# Patient Record
Sex: FEMALE | Race: WHITE | NOT HISPANIC OR LATINO
[De-identification: names, ages, dates, MRNs, and addresses within clinical notes are randomized per-mention and may not be internally consistent; named-entity substitution may affect disease eponyms.]

---

## 2017-02-18 ENCOUNTER — MEDICATION RENEWAL (OUTPATIENT)
Age: 72
End: 2017-02-18

## 2017-02-27 ENCOUNTER — APPOINTMENT (OUTPATIENT)
Dept: RHEUMATOLOGY | Facility: CLINIC | Age: 72
End: 2017-02-27

## 2017-02-27 VITALS
DIASTOLIC BLOOD PRESSURE: 82 MMHG | WEIGHT: 100 LBS | HEIGHT: 62 IN | BODY MASS INDEX: 18.4 KG/M2 | TEMPERATURE: 98.3 F | SYSTOLIC BLOOD PRESSURE: 120 MMHG | RESPIRATION RATE: 20 BRPM | HEART RATE: 76 BPM

## 2017-02-27 RX ORDER — METFORMIN HYDROCHLORIDE 500 MG/1
500 TABLET, COATED ORAL
Refills: 0 | Status: ACTIVE | COMMUNITY

## 2017-02-27 RX ORDER — AMLODIPINE BESYLATE 5 MG/1
5 TABLET ORAL
Refills: 0 | Status: ACTIVE | COMMUNITY

## 2017-02-27 RX ORDER — PNV NO.95/FERROUS FUM/FOLIC AC 28MG-0.8MG
TABLET ORAL
Refills: 0 | Status: ACTIVE | COMMUNITY

## 2017-04-25 ENCOUNTER — OUTPATIENT (OUTPATIENT)
Dept: OUTPATIENT SERVICES | Facility: HOSPITAL | Age: 72
LOS: 1 days | End: 2017-04-25
Payer: MEDICARE

## 2017-04-25 DIAGNOSIS — M17.11 UNILATERAL PRIMARY OSTEOARTHRITIS, RIGHT KNEE: ICD-10-CM

## 2017-04-25 DIAGNOSIS — Z51.89 ENCOUNTER FOR OTHER SPECIFIED AFTERCARE: ICD-10-CM

## 2017-04-25 DIAGNOSIS — Z96.649 PRESENCE OF UNSPECIFIED ARTIFICIAL HIP JOINT: Chronic | ICD-10-CM

## 2017-06-08 ENCOUNTER — LABORATORY RESULT (OUTPATIENT)
Age: 72
End: 2017-06-08

## 2017-06-08 ENCOUNTER — APPOINTMENT (OUTPATIENT)
Dept: RHEUMATOLOGY | Facility: CLINIC | Age: 72
End: 2017-06-08

## 2017-06-08 VITALS
DIASTOLIC BLOOD PRESSURE: 80 MMHG | HEART RATE: 84 BPM | SYSTOLIC BLOOD PRESSURE: 120 MMHG | BODY MASS INDEX: 16.83 KG/M2 | RESPIRATION RATE: 20 BRPM | TEMPERATURE: 98.7 F | WEIGHT: 95 LBS | HEIGHT: 63 IN

## 2017-06-08 DIAGNOSIS — M23.51 CHRONIC INSTABILITY OF KNEE, RIGHT KNEE: ICD-10-CM

## 2017-06-08 DIAGNOSIS — M79.645 PAIN IN LEFT FINGER(S): ICD-10-CM

## 2017-06-08 DIAGNOSIS — M79.644 PAIN IN LEFT FINGER(S): ICD-10-CM

## 2017-06-09 LAB
BILIRUB UR QL STRIP: NORMAL
CLARITY UR: CLEAR
COLLECTION METHOD: NORMAL
GLUCOSE UR-MCNC: NORMAL
HCG UR QL: 0.2 EU/DL
HGB UR QL STRIP.AUTO: NORMAL
KETONES UR-MCNC: NORMAL
LEUKOCYTE ESTERASE UR QL STRIP: NORMAL
NITRITE UR QL STRIP: NORMAL
PH UR STRIP: 5.5
PROT UR STRIP-MCNC: NORMAL
SP GR UR STRIP: 1.02
WESR: 4

## 2017-06-10 LAB
ALBUMIN SERPL ELPH-MCNC: 4.9 G/DL
ALP BLD-CCNC: 111 U/L
ALT SERPL-CCNC: 21 U/L
ANION GAP SERPL CALC-SCNC: 18 MMOL/L
AST SERPL-CCNC: 27 U/L
BASOPHILS # BLD AUTO: 0.04 K/UL
BASOPHILS NFR BLD AUTO: 0.8 %
BILIRUB SERPL-MCNC: 0.3 MG/DL
BUN SERPL-MCNC: 24 MG/DL
CALCIUM SERPL-MCNC: 10.6 MG/DL
CHLORIDE SERPL-SCNC: 105 MMOL/L
CO2 SERPL-SCNC: 22 MMOL/L
CREAT SERPL-MCNC: 0.51 MG/DL
CRP SERPL-MCNC: <0.2 MG/DL
EOSINOPHIL # BLD AUTO: 0.33 K/UL
EOSINOPHIL NFR BLD AUTO: 6.6 %
GLUCOSE SERPL-MCNC: 137 MG/DL
HCT VFR BLD CALC: 40.8 %
HGB BLD-MCNC: 13 G/DL
LYMPHOCYTES # BLD AUTO: 1.35 K/UL
LYMPHOCYTES NFR BLD AUTO: 27 %
M PROTEIN SPEC IFE-MCNC: NORMAL
MAN DIFF?: NORMAL
MCHC RBC-ENTMCNC: 28.6 PG
MCHC RBC-ENTMCNC: 31.9 GM/DL
MCV RBC AUTO: 89.7 FL
MONOCYTES # BLD AUTO: 0.17 K/UL
MONOCYTES NFR BLD AUTO: 3.3 %
NEUTROPHILS # BLD AUTO: 3.12 K/UL
NEUTROPHILS NFR BLD AUTO: 59.8 %
PHOSPHATE SERPL-MCNC: 3.4 MG/DL
PLATELET # BLD AUTO: 197 K/UL
POTASSIUM SERPL-SCNC: 4.2 MMOL/L
PROT SERPL-MCNC: 7.1 G/DL
RBC # BLD: 4.55 M/UL
RBC # FLD: 14.6 %
SODIUM SERPL-SCNC: 145 MMOL/L
TSH SERPL-ACNC: 1.12 UIU/ML
WBC # FLD AUTO: 5 K/UL

## 2017-06-11 LAB
DEPRECATED KAPPA LC FREE/LAMBDA SER: 0.8 RATIO
IGA SER QL IEP: 82 MG/DL
IGG SER QL IEP: 836 MG/DL
IGM SER QL IEP: 62 MG/DL
KAPPA LC CSF-MCNC: 0.99 MG/DL
KAPPA LC SERPL-MCNC: 0.79 MG/DL

## 2017-07-01 ENCOUNTER — RX RENEWAL (OUTPATIENT)
Age: 72
End: 2017-07-01

## 2017-07-20 PROCEDURE — 97162 PT EVAL MOD COMPLEX 30 MIN: CPT

## 2017-07-20 PROCEDURE — G8980: CPT | Mod: CI

## 2017-07-20 PROCEDURE — 97110 THERAPEUTIC EXERCISES: CPT | Mod: KX

## 2017-07-20 PROCEDURE — G8979: CPT | Mod: CK

## 2017-07-20 PROCEDURE — G8978: CPT | Mod: CJ

## 2017-07-20 PROCEDURE — 97010 HOT OR COLD PACKS THERAPY: CPT | Mod: KX

## 2017-07-20 PROCEDURE — 97140 MANUAL THERAPY 1/> REGIONS: CPT | Mod: KX

## 2017-08-03 ENCOUNTER — MEDICATION RENEWAL (OUTPATIENT)
Age: 72
End: 2017-08-03

## 2017-09-13 ENCOUNTER — MEDICATION RENEWAL (OUTPATIENT)
Age: 72
End: 2017-09-13

## 2017-09-29 ENCOUNTER — MEDICATION RENEWAL (OUTPATIENT)
Age: 72
End: 2017-09-29

## 2017-10-13 ENCOUNTER — APPOINTMENT (OUTPATIENT)
Dept: DERMATOLOGY | Facility: CLINIC | Age: 72
End: 2017-10-13
Payer: MEDICARE

## 2017-10-13 PROCEDURE — 99203 OFFICE O/P NEW LOW 30 MIN: CPT

## 2017-10-30 ENCOUNTER — APPOINTMENT (OUTPATIENT)
Dept: RHEUMATOLOGY | Facility: CLINIC | Age: 72
End: 2017-10-30
Payer: MEDICARE

## 2017-10-30 VITALS
SYSTOLIC BLOOD PRESSURE: 120 MMHG | OXYGEN SATURATION: 95 % | HEIGHT: 63 IN | BODY MASS INDEX: 17.36 KG/M2 | HEART RATE: 86 BPM | RESPIRATION RATE: 18 BRPM | TEMPERATURE: 98.3 F | WEIGHT: 98 LBS | DIASTOLIC BLOOD PRESSURE: 74 MMHG

## 2017-10-30 DIAGNOSIS — M24.849 OTHER SPECIFIC JOINT DERANGEMENTS OF UNSPECIFIED HAND, NOT ELSEWHERE CLASSIFIED: ICD-10-CM

## 2017-10-30 LAB
BILIRUB UR QL STRIP: NORMAL
COLLECTION METHOD: NORMAL
GLUCOSE UR-MCNC: NORMAL
HCG UR QL: 0.2 EU/DL
HGB UR QL STRIP.AUTO: NORMAL
KETONES UR-MCNC: NORMAL
LEUKOCYTE ESTERASE UR QL STRIP: NORMAL
NITRITE UR QL STRIP: NORMAL
PH UR STRIP: 5.5
PROT UR STRIP-MCNC: NORMAL
SP GR UR STRIP: 1.02
WESR: 3

## 2017-10-30 PROCEDURE — 36415 COLL VENOUS BLD VENIPUNCTURE: CPT

## 2017-10-30 PROCEDURE — 99215 OFFICE O/P EST HI 40 MIN: CPT | Mod: 25

## 2017-10-30 PROCEDURE — 85651 RBC SED RATE NONAUTOMATED: CPT

## 2017-10-30 PROCEDURE — 81003 URINALYSIS AUTO W/O SCOPE: CPT | Mod: QW

## 2017-11-04 LAB
25(OH)D3 SERPL-MCNC: 42.9 NG/ML
ALBUMIN SERPL ELPH-MCNC: 4.8 G/DL
ALP BLD-CCNC: 101 U/L
ALT SERPL-CCNC: 26 U/L
ANION GAP SERPL CALC-SCNC: 15 MMOL/L
AST SERPL-CCNC: 27 U/L
BASOPHILS # BLD AUTO: 0.02 K/UL
BASOPHILS NFR BLD AUTO: 0.4 %
BILIRUB SERPL-MCNC: 0.3 MG/DL
BUN SERPL-MCNC: 21 MG/DL
CALCIUM SERPL-MCNC: 11 MG/DL
CALCIUM SERPL-MCNC: 11 MG/DL
CHLORIDE SERPL-SCNC: 103 MMOL/L
CK SERPL-CCNC: 132 U/L
CO2 SERPL-SCNC: 26 MMOL/L
CREAT SERPL-MCNC: 0.53 MG/DL
CRP SERPL-MCNC: <0.2 MG/DL
DEPRECATED KAPPA LC FREE/LAMBDA SER: 0.93 RATIO
ENDOMYSIUM IGA SER QL: NEGATIVE
ENDOMYSIUM IGA TITR SER: NORMAL
EOSINOPHIL # BLD AUTO: 0.22 K/UL
EOSINOPHIL NFR BLD AUTO: 4.4 %
GLIADIN IGA SER QL: <5 UNITS
GLIADIN IGG SER QL: <5 UNITS
GLIADIN PEPTIDE IGA SER-ACNC: NEGATIVE
GLIADIN PEPTIDE IGG SER-ACNC: NEGATIVE
GLUCOSE SERPL-MCNC: 198 MG/DL
HCT VFR BLD CALC: 40.9 %
HGB BLD-MCNC: 13.1 G/DL
IGA SER QL IEP: 81 MG/DL
IGG SER QL IEP: 752 MG/DL
IGM SER QL IEP: 61 MG/DL
IMM GRANULOCYTES NFR BLD AUTO: 0.4 %
KAPPA LC CSF-MCNC: 1.1 MG/DL
KAPPA LC SERPL-MCNC: 1.02 MG/DL
LYMPHOCYTES # BLD AUTO: 1.1 K/UL
LYMPHOCYTES NFR BLD AUTO: 22.2 %
M PROTEIN SPEC IFE-MCNC: NORMAL
MAN DIFF?: NORMAL
MCHC RBC-ENTMCNC: 28.4 PG
MCHC RBC-ENTMCNC: 32 GM/DL
MCV RBC AUTO: 88.5 FL
MONOCYTES # BLD AUTO: 0.48 K/UL
MONOCYTES NFR BLD AUTO: 9.7 %
NEUTROPHILS # BLD AUTO: 3.11 K/UL
NEUTROPHILS NFR BLD AUTO: 62.9 %
PARATHYROID HORMONE INTACT: 26 PG/ML
PHOSPHATE SERPL-MCNC: 3.5 MG/DL
PLATELET # BLD AUTO: 179 K/UL
POTASSIUM SERPL-SCNC: 4.1 MMOL/L
PROT SERPL-MCNC: 7 G/DL
RBC # BLD: 4.62 M/UL
RBC # FLD: 15.1 %
SODIUM SERPL-SCNC: 144 MMOL/L
TTG IGA SER IA-ACNC: <5 UNITS
TTG IGA SER-ACNC: NEGATIVE
TTG IGG SER IA-ACNC: <5 UNITS
TTG IGG SER IA-ACNC: NEGATIVE
WBC # FLD AUTO: 4.95 K/UL

## 2017-11-06 ENCOUNTER — CLINICAL ADVICE (OUTPATIENT)
Age: 72
End: 2017-11-06

## 2017-11-08 ENCOUNTER — OUTPATIENT (OUTPATIENT)
Dept: OUTPATIENT SERVICES | Facility: HOSPITAL | Age: 72
LOS: 1 days | End: 2017-11-08
Payer: MEDICARE

## 2017-11-08 DIAGNOSIS — Z51.89 ENCOUNTER FOR OTHER SPECIFIED AFTERCARE: ICD-10-CM

## 2017-11-08 DIAGNOSIS — R53.1 WEAKNESS: ICD-10-CM

## 2017-11-08 DIAGNOSIS — M15.9 POLYOSTEOARTHRITIS, UNSPECIFIED: ICD-10-CM

## 2017-11-08 DIAGNOSIS — Z96.649 PRESENCE OF UNSPECIFIED ARTIFICIAL HIP JOINT: Chronic | ICD-10-CM

## 2017-11-08 DIAGNOSIS — R27.8 OTHER LACK OF COORDINATION: ICD-10-CM

## 2017-11-14 ENCOUNTER — LABORATORY RESULT (OUTPATIENT)
Age: 72
End: 2017-11-14

## 2017-11-21 ENCOUNTER — CLINICAL ADVICE (OUTPATIENT)
Age: 72
End: 2017-11-21

## 2017-11-29 ENCOUNTER — OUTPATIENT (OUTPATIENT)
Dept: OUTPATIENT SERVICES | Facility: HOSPITAL | Age: 72
LOS: 1 days | End: 2017-11-29

## 2017-11-29 DIAGNOSIS — Z96.649 PRESENCE OF UNSPECIFIED ARTIFICIAL HIP JOINT: Chronic | ICD-10-CM

## 2017-11-29 DIAGNOSIS — Z00.8 ENCOUNTER FOR OTHER GENERAL EXAMINATION: ICD-10-CM

## 2017-12-06 ENCOUNTER — LABORATORY RESULT (OUTPATIENT)
Age: 72
End: 2017-12-06

## 2017-12-13 PROCEDURE — G8989: CPT | Mod: CJ

## 2017-12-13 PROCEDURE — 97140 MANUAL THERAPY 1/> REGIONS: CPT

## 2017-12-13 PROCEDURE — 97165 OT EVAL LOW COMPLEX 30 MIN: CPT

## 2017-12-13 PROCEDURE — G8986: CPT | Mod: CJ

## 2017-12-13 PROCEDURE — G8987: CPT | Mod: CK

## 2017-12-13 PROCEDURE — G8988: CPT | Mod: CI

## 2017-12-13 PROCEDURE — 97750 PHYSICAL PERFORMANCE TEST: CPT

## 2017-12-13 PROCEDURE — G8985: CPT | Mod: CJ

## 2017-12-13 PROCEDURE — 97110 THERAPEUTIC EXERCISES: CPT

## 2018-01-09 ENCOUNTER — MEDICATION RENEWAL (OUTPATIENT)
Age: 73
End: 2018-01-09

## 2018-01-20 ENCOUNTER — MEDICATION RENEWAL (OUTPATIENT)
Age: 73
End: 2018-01-20

## 2018-01-24 ENCOUNTER — APPOINTMENT (OUTPATIENT)
Dept: DERMATOLOGY | Facility: CLINIC | Age: 73
End: 2018-01-24
Payer: MEDICARE

## 2018-01-24 PROCEDURE — 99213 OFFICE O/P EST LOW 20 MIN: CPT

## 2018-02-22 ENCOUNTER — APPOINTMENT (OUTPATIENT)
Dept: RHEUMATOLOGY | Facility: CLINIC | Age: 73
End: 2018-02-22
Payer: MEDICARE

## 2018-02-22 VITALS
BODY MASS INDEX: 17.72 KG/M2 | HEART RATE: 85 BPM | WEIGHT: 100 LBS | TEMPERATURE: 98.5 F | SYSTOLIC BLOOD PRESSURE: 116 MMHG | HEIGHT: 63 IN | RESPIRATION RATE: 17 BRPM | OXYGEN SATURATION: 98 % | DIASTOLIC BLOOD PRESSURE: 69 MMHG

## 2018-02-22 PROCEDURE — 99214 OFFICE O/P EST MOD 30 MIN: CPT

## 2018-02-24 LAB
25(OH)D3 SERPL-MCNC: 51.2 NG/ML
ALBUMIN SERPL ELPH-MCNC: 4.6 G/DL
ALP BLD-CCNC: 91 U/L
ALT SERPL-CCNC: 26 U/L
ANION GAP SERPL CALC-SCNC: 14 MMOL/L
APPEARANCE: CLEAR
AST SERPL-CCNC: 30 U/L
BASOPHILS # BLD AUTO: 0.03 K/UL
BASOPHILS NFR BLD AUTO: 0.7 %
BILIRUB SERPL-MCNC: 0.4 MG/DL
BILIRUBIN URINE: NEGATIVE
BLOOD URINE: NEGATIVE
BUN SERPL-MCNC: 19 MG/DL
CALCIUM SERPL-MCNC: 10.8 MG/DL
CHLORIDE SERPL-SCNC: 101 MMOL/L
CO2 SERPL-SCNC: 29 MMOL/L
COLOR: YELLOW
CREAT SERPL-MCNC: 0.45 MG/DL
CRP SERPL-MCNC: <0.2 MG/DL
EOSINOPHIL # BLD AUTO: 0.22 K/UL
EOSINOPHIL NFR BLD AUTO: 5.5 %
GLUCOSE QUALITATIVE U: NEGATIVE MG/DL
GLUCOSE SERPL-MCNC: 127 MG/DL
HCT VFR BLD CALC: 42.7 %
HGB BLD-MCNC: 13.5 G/DL
IMM GRANULOCYTES NFR BLD AUTO: 0 %
KETONES URINE: NEGATIVE
LEUKOCYTE ESTERASE URINE: NEGATIVE
LYMPHOCYTES # BLD AUTO: 0.94 K/UL
LYMPHOCYTES NFR BLD AUTO: 23.4 %
MAGNESIUM SERPL-MCNC: 2.2 MG/DL
MAN DIFF?: NORMAL
MCHC RBC-ENTMCNC: 28.5 PG
MCHC RBC-ENTMCNC: 31.6 GM/DL
MCV RBC AUTO: 90.3 FL
MONOCYTES # BLD AUTO: 0.35 K/UL
MONOCYTES NFR BLD AUTO: 8.7 %
NEUTROPHILS # BLD AUTO: 2.48 K/UL
NEUTROPHILS NFR BLD AUTO: 61.7 %
NITRITE URINE: NEGATIVE
PH URINE: 6
PHOSPHATE SERPL-MCNC: 3.3 MG/DL
PLATELET # BLD AUTO: 171 K/UL
POTASSIUM SERPL-SCNC: 3.7 MMOL/L
PROT SERPL-MCNC: 7.2 G/DL
PROTEIN URINE: NEGATIVE MG/DL
RBC # BLD: 4.73 M/UL
RBC # FLD: 16.1 %
SODIUM SERPL-SCNC: 144 MMOL/L
SPECIFIC GRAVITY URINE: 1.02
UROBILINOGEN URINE: NEGATIVE MG/DL
WBC # FLD AUTO: 4.02 K/UL

## 2018-03-13 ENCOUNTER — RX RENEWAL (OUTPATIENT)
Age: 73
End: 2018-03-13

## 2018-04-04 ENCOUNTER — OUTPATIENT (OUTPATIENT)
Dept: OUTPATIENT SERVICES | Facility: HOSPITAL | Age: 73
LOS: 1 days | End: 2018-04-04
Payer: MEDICARE

## 2018-04-04 DIAGNOSIS — M25.512 PAIN IN LEFT SHOULDER: ICD-10-CM

## 2018-04-04 DIAGNOSIS — Z51.89 ENCOUNTER FOR OTHER SPECIFIED AFTERCARE: ICD-10-CM

## 2018-04-04 DIAGNOSIS — Z96.649 PRESENCE OF UNSPECIFIED ARTIFICIAL HIP JOINT: Chronic | ICD-10-CM

## 2018-04-04 DIAGNOSIS — M25.511 PAIN IN RIGHT SHOULDER: ICD-10-CM

## 2018-04-09 ENCOUNTER — MEDICATION RENEWAL (OUTPATIENT)
Age: 73
End: 2018-04-09

## 2018-05-03 PROCEDURE — 97140 MANUAL THERAPY 1/> REGIONS: CPT

## 2018-05-03 PROCEDURE — 97110 THERAPEUTIC EXERCISES: CPT

## 2018-05-03 PROCEDURE — G8984: CPT | Mod: CL

## 2018-05-03 PROCEDURE — 97010 HOT OR COLD PACKS THERAPY: CPT

## 2018-05-03 PROCEDURE — G8985: CPT | Mod: CK

## 2018-05-03 PROCEDURE — G8986: CPT | Mod: CJ

## 2018-05-03 PROCEDURE — 97162 PT EVAL MOD COMPLEX 30 MIN: CPT

## 2018-05-17 ENCOUNTER — APPOINTMENT (OUTPATIENT)
Dept: ORTHOPEDIC SURGERY | Facility: CLINIC | Age: 73
End: 2018-05-17
Payer: MEDICARE

## 2018-05-17 VITALS
BODY MASS INDEX: 17.72 KG/M2 | WEIGHT: 100 LBS | HEIGHT: 63 IN | DIASTOLIC BLOOD PRESSURE: 73 MMHG | HEART RATE: 83 BPM | SYSTOLIC BLOOD PRESSURE: 126 MMHG

## 2018-05-17 PROCEDURE — 73080 X-RAY EXAM OF ELBOW: CPT | Mod: LT

## 2018-05-17 PROCEDURE — 99215 OFFICE O/P EST HI 40 MIN: CPT

## 2018-05-31 ENCOUNTER — APPOINTMENT (OUTPATIENT)
Dept: RHEUMATOLOGY | Facility: CLINIC | Age: 73
End: 2018-05-31
Payer: MEDICARE

## 2018-05-31 VITALS
WEIGHT: 100 LBS | HEIGHT: 63 IN | DIASTOLIC BLOOD PRESSURE: 70 MMHG | SYSTOLIC BLOOD PRESSURE: 140 MMHG | TEMPERATURE: 98.7 F | HEART RATE: 78 BPM | BODY MASS INDEX: 17.72 KG/M2 | RESPIRATION RATE: 16 BRPM | OXYGEN SATURATION: 97 %

## 2018-05-31 PROCEDURE — 81003 URINALYSIS AUTO W/O SCOPE: CPT | Mod: QW

## 2018-05-31 PROCEDURE — 85651 RBC SED RATE NONAUTOMATED: CPT

## 2018-05-31 PROCEDURE — 99214 OFFICE O/P EST MOD 30 MIN: CPT | Mod: 25

## 2018-05-31 PROCEDURE — 36415 COLL VENOUS BLD VENIPUNCTURE: CPT

## 2018-05-31 RX ORDER — MELOXICAM 7.5 MG/1
7.5 TABLET ORAL
Qty: 30 | Refills: 1 | Status: DISCONTINUED | COMMUNITY
Start: 2018-03-13 | End: 2018-05-31

## 2018-05-31 RX ORDER — MELOXICAM 7.5 MG/1
7.5 TABLET ORAL
Refills: 0 | Status: DISCONTINUED | COMMUNITY
End: 2018-05-31

## 2018-06-01 LAB
BILIRUB UR QL STRIP: NORMAL
CLARITY UR: CLEAR
COLLECTION METHOD: NORMAL
GLUCOSE UR-MCNC: NORMAL
HCG UR QL: 0.2 EU/DL
HGB UR QL STRIP.AUTO: NORMAL
KETONES UR-MCNC: NORMAL
LEUKOCYTE ESTERASE UR QL STRIP: NORMAL
NITRITE UR QL STRIP: NORMAL
PH UR STRIP: 6.5
PROT UR STRIP-MCNC: NORMAL
SP GR UR STRIP: 1.02
WESR: 4

## 2018-06-02 LAB
ALBUMIN SERPL ELPH-MCNC: 4.8 G/DL
ALP BLD-CCNC: 98 U/L
ALT SERPL-CCNC: 24 U/L
ANION GAP SERPL CALC-SCNC: 14 MMOL/L
AST SERPL-CCNC: 28 U/L
BASOPHILS # BLD AUTO: 0.04 K/UL
BASOPHILS NFR BLD AUTO: 0.9 %
BILIRUB SERPL-MCNC: 0.3 MG/DL
BUN SERPL-MCNC: 18 MG/DL
CALCIUM SERPL-MCNC: 10.1 MG/DL
CHLORIDE SERPL-SCNC: 104 MMOL/L
CO2 SERPL-SCNC: 26 MMOL/L
CREAT SERPL-MCNC: 0.51 MG/DL
CRP SERPL-MCNC: <0.2 MG/DL
EOSINOPHIL # BLD AUTO: 0.32 K/UL
EOSINOPHIL NFR BLD AUTO: 6.9 %
GLUCOSE SERPL-MCNC: 198 MG/DL
HCT VFR BLD CALC: 42.7 %
HGB BLD-MCNC: 13.6 G/DL
IMM GRANULOCYTES NFR BLD AUTO: 0.4 %
LYMPHOCYTES # BLD AUTO: 1.24 K/UL
LYMPHOCYTES NFR BLD AUTO: 26.7 %
MAGNESIUM SERPL-MCNC: 2.3 MG/DL
MAN DIFF?: NORMAL
MCHC RBC-ENTMCNC: 29.2 PG
MCHC RBC-ENTMCNC: 31.9 GM/DL
MCV RBC AUTO: 91.8 FL
MONOCYTES # BLD AUTO: 0.46 K/UL
MONOCYTES NFR BLD AUTO: 9.9 %
NEUTROPHILS # BLD AUTO: 2.57 K/UL
NEUTROPHILS NFR BLD AUTO: 55.2 %
PHOSPHATE SERPL-MCNC: 3.3 MG/DL
PLATELET # BLD AUTO: 186 K/UL
POTASSIUM SERPL-SCNC: 4.2 MMOL/L
PROT SERPL-MCNC: 7 G/DL
RBC # BLD: 4.65 M/UL
RBC # FLD: 15.5 %
SODIUM SERPL-SCNC: 144 MMOL/L
TSH SERPL-ACNC: 1.2 UIU/ML
WBC # FLD AUTO: 4.65 K/UL

## 2018-06-13 ENCOUNTER — APPOINTMENT (OUTPATIENT)
Dept: RHEUMATOLOGY | Facility: CLINIC | Age: 73
End: 2018-06-13
Payer: MEDICARE

## 2018-06-13 ENCOUNTER — LABORATORY RESULT (OUTPATIENT)
Age: 73
End: 2018-06-13

## 2018-06-13 VITALS
SYSTOLIC BLOOD PRESSURE: 124 MMHG | OXYGEN SATURATION: 96 % | TEMPERATURE: 98.6 F | RESPIRATION RATE: 18 BRPM | DIASTOLIC BLOOD PRESSURE: 74 MMHG | BODY MASS INDEX: 17.71 KG/M2 | WEIGHT: 100 LBS | HEART RATE: 88 BPM

## 2018-06-13 DIAGNOSIS — M54.9 DORSALGIA, UNSPECIFIED: ICD-10-CM

## 2018-06-13 DIAGNOSIS — R21 RASH AND OTHER NONSPECIFIC SKIN ERUPTION: ICD-10-CM

## 2018-06-13 PROCEDURE — 99214 OFFICE O/P EST MOD 30 MIN: CPT | Mod: 25

## 2018-06-13 PROCEDURE — 36415 COLL VENOUS BLD VENIPUNCTURE: CPT

## 2018-06-13 RX ORDER — TRIAMCINOLONE ACETONIDE 1 MG/G
0.1 PASTE DENTAL
Qty: 5 | Refills: 0 | Status: DISCONTINUED | COMMUNITY
Start: 2018-02-03

## 2018-06-13 RX ORDER — VALACYCLOVIR 500 MG/1
500 TABLET, FILM COATED ORAL
Qty: 6 | Refills: 0 | Status: DISCONTINUED | COMMUNITY
Start: 2018-02-03

## 2018-06-14 LAB
BASOPHILS # BLD AUTO: 0.04 K/UL
BASOPHILS NFR BLD AUTO: 0.8 %
EOSINOPHIL # BLD AUTO: 0.33 K/UL
EOSINOPHIL NFR BLD AUTO: 6.5 %
HCT VFR BLD CALC: 42.8 %
HGB BLD-MCNC: 13.9 G/DL
IMM GRANULOCYTES NFR BLD AUTO: 0.2 %
LYMPHOCYTES # BLD AUTO: 1 K/UL
LYMPHOCYTES NFR BLD AUTO: 19.6 %
MAN DIFF?: NORMAL
MCHC RBC-ENTMCNC: 29.4 PG
MCHC RBC-ENTMCNC: 32.5 GM/DL
MCV RBC AUTO: 90.5 FL
MONOCYTES # BLD AUTO: 0.47 K/UL
MONOCYTES NFR BLD AUTO: 9.2 %
NEUTROPHILS # BLD AUTO: 3.24 K/UL
NEUTROPHILS NFR BLD AUTO: 63.7 %
PLATELET # BLD AUTO: 152 K/UL
RBC # BLD: 4.73 M/UL
RBC # FLD: 15.2 %
WBC # FLD AUTO: 5.09 K/UL

## 2018-06-15 ENCOUNTER — APPOINTMENT (OUTPATIENT)
Dept: ORTHOPEDIC SURGERY | Facility: CLINIC | Age: 73
End: 2018-06-15
Payer: MEDICARE

## 2018-06-15 VITALS
BODY MASS INDEX: 17.72 KG/M2 | SYSTOLIC BLOOD PRESSURE: 124 MMHG | DIASTOLIC BLOOD PRESSURE: 79 MMHG | WEIGHT: 100 LBS | HEIGHT: 63 IN | HEART RATE: 88 BPM

## 2018-06-15 DIAGNOSIS — M77.12 LATERAL EPICONDYLITIS, LEFT ELBOW: ICD-10-CM

## 2018-06-15 PROCEDURE — 20550 NJX 1 TENDON SHEATH/LIGAMENT: CPT | Mod: LT

## 2018-06-15 PROCEDURE — 99213 OFFICE O/P EST LOW 20 MIN: CPT | Mod: 25

## 2018-06-15 RX ORDER — LANCETS
EACH MISCELLANEOUS
Qty: 100 | Refills: 0 | Status: ACTIVE | COMMUNITY
Start: 2017-12-12

## 2018-06-15 RX ORDER — BLOOD SUGAR DIAGNOSTIC
STRIP MISCELLANEOUS
Qty: 100 | Refills: 0 | Status: ACTIVE | COMMUNITY
Start: 2017-12-12

## 2018-06-16 LAB
ALBUMIN SERPL ELPH-MCNC: 4.8 G/DL
ALP BLD-CCNC: 87 U/L
ALT SERPL-CCNC: 28 U/L
ANA PAT FLD IF-IMP: ABNORMAL
ANA SER IF-ACNC: ABNORMAL
ANION GAP SERPL CALC-SCNC: 17 MMOL/L
AST SERPL-CCNC: 34 U/L
BILIRUB SERPL-MCNC: 0.5 MG/DL
BUN SERPL-MCNC: 18 MG/DL
CALCIUM SERPL-MCNC: 10.1 MG/DL
CHLORIDE SERPL-SCNC: 103 MMOL/L
CO2 SERPL-SCNC: 26 MMOL/L
CREAT SERPL-MCNC: 0.6 MG/DL
GLUCOSE SERPL-MCNC: 153 MG/DL
LDH SERPL-CCNC: 234 U/L
POTASSIUM SERPL-SCNC: 4.3 MMOL/L
PROT SERPL-MCNC: 7.1 G/DL
SODIUM SERPL-SCNC: 146 MMOL/L

## 2018-06-20 ENCOUNTER — OUTPATIENT (OUTPATIENT)
Dept: OUTPATIENT SERVICES | Facility: HOSPITAL | Age: 73
LOS: 1 days | End: 2018-06-20
Payer: MEDICARE

## 2018-06-20 DIAGNOSIS — M77.12 LATERAL EPICONDYLITIS, LEFT ELBOW: ICD-10-CM

## 2018-06-20 DIAGNOSIS — Z51.89 ENCOUNTER FOR OTHER SPECIFIED AFTERCARE: ICD-10-CM

## 2018-06-20 DIAGNOSIS — Z96.649 PRESENCE OF UNSPECIFIED ARTIFICIAL HIP JOINT: Chronic | ICD-10-CM

## 2018-06-28 ENCOUNTER — APPOINTMENT (OUTPATIENT)
Dept: RHEUMATOLOGY | Facility: CLINIC | Age: 73
End: 2018-06-28
Payer: MEDICARE

## 2018-06-28 PROCEDURE — 96372 THER/PROPH/DIAG INJ SC/IM: CPT

## 2018-06-29 PROCEDURE — 97162 PT EVAL MOD COMPLEX 30 MIN: CPT | Mod: KX

## 2018-06-29 PROCEDURE — G8985: CPT | Mod: CK

## 2018-06-29 PROCEDURE — 97110 THERAPEUTIC EXERCISES: CPT | Mod: KX

## 2018-06-29 PROCEDURE — G8984: CPT | Mod: CL

## 2018-06-29 PROCEDURE — 97140 MANUAL THERAPY 1/> REGIONS: CPT | Mod: KX

## 2018-06-29 PROCEDURE — 97010 HOT OR COLD PACKS THERAPY: CPT | Mod: KX

## 2018-06-30 ENCOUNTER — RX RENEWAL (OUTPATIENT)
Age: 73
End: 2018-06-30

## 2018-09-06 ENCOUNTER — APPOINTMENT (OUTPATIENT)
Dept: RHEUMATOLOGY | Facility: CLINIC | Age: 73
End: 2018-09-06

## 2018-09-24 ENCOUNTER — APPOINTMENT (OUTPATIENT)
Dept: DERMATOLOGY | Facility: CLINIC | Age: 73
End: 2018-09-24

## 2018-10-12 ENCOUNTER — APPOINTMENT (OUTPATIENT)
Dept: DERMATOLOGY | Facility: CLINIC | Age: 73
End: 2018-10-12
Payer: MEDICARE

## 2018-10-12 PROCEDURE — 99214 OFFICE O/P EST MOD 30 MIN: CPT

## 2018-11-03 ENCOUNTER — RX RENEWAL (OUTPATIENT)
Age: 73
End: 2018-11-03

## 2018-11-08 ENCOUNTER — APPOINTMENT (OUTPATIENT)
Dept: ORTHOPEDIC SURGERY | Facility: CLINIC | Age: 73
End: 2018-11-08
Payer: MEDICARE

## 2018-11-08 VITALS
BODY MASS INDEX: 17.72 KG/M2 | WEIGHT: 100 LBS | HEART RATE: 84 BPM | HEIGHT: 63 IN | DIASTOLIC BLOOD PRESSURE: 78 MMHG | SYSTOLIC BLOOD PRESSURE: 126 MMHG

## 2018-11-08 DIAGNOSIS — M19.022 PRIMARY OSTEOARTHRITIS, LEFT ELBOW: ICD-10-CM

## 2018-11-08 PROCEDURE — 99213 OFFICE O/P EST LOW 20 MIN: CPT

## 2018-11-10 ENCOUNTER — RX RENEWAL (OUTPATIENT)
Age: 73
End: 2018-11-10

## 2018-11-12 ENCOUNTER — APPOINTMENT (OUTPATIENT)
Dept: RHEUMATOLOGY | Facility: CLINIC | Age: 73
End: 2018-11-12
Payer: MEDICARE

## 2018-11-12 VITALS
RESPIRATION RATE: 17 BRPM | HEIGHT: 63 IN | DIASTOLIC BLOOD PRESSURE: 74 MMHG | WEIGHT: 100 LBS | OXYGEN SATURATION: 98 % | SYSTOLIC BLOOD PRESSURE: 126 MMHG | HEART RATE: 89 BPM | TEMPERATURE: 98.9 F | BODY MASS INDEX: 17.72 KG/M2

## 2018-11-12 DIAGNOSIS — M25.522 PAIN IN LEFT ELBOW: ICD-10-CM

## 2018-11-12 PROCEDURE — 99215 OFFICE O/P EST HI 40 MIN: CPT | Mod: 25

## 2018-11-12 PROCEDURE — 36415 COLL VENOUS BLD VENIPUNCTURE: CPT

## 2018-11-12 PROCEDURE — 81003 URINALYSIS AUTO W/O SCOPE: CPT | Mod: QW

## 2018-11-12 RX ORDER — DIFLUPREDNATE 0.5 MG/ML
0.05 EMULSION OPHTHALMIC
Qty: 5 | Refills: 0 | Status: DISCONTINUED | COMMUNITY
Start: 2018-05-08 | End: 2018-11-12

## 2018-11-12 RX ORDER — ETODOLAC 400 MG/1
400 TABLET, FILM COATED, EXTENDED RELEASE ORAL
Qty: 60 | Refills: 2 | Status: DISCONTINUED | COMMUNITY
Start: 2018-05-31 | End: 2018-11-12

## 2018-11-12 RX ORDER — BESIFLOXACIN 6 MG/ML
0.6 SUSPENSION OPHTHALMIC
Qty: 5 | Refills: 0 | Status: DISCONTINUED | COMMUNITY
Start: 2018-05-08 | End: 2018-11-12

## 2018-11-12 RX ORDER — BROMFENAC SODIUM 0.7 MG/ML
0.07 SOLUTION/ DROPS OPHTHALMIC
Qty: 3 | Refills: 0 | Status: DISCONTINUED | COMMUNITY
Start: 2018-05-08 | End: 2018-11-12

## 2018-11-13 LAB
ALBUMIN SERPL ELPH-MCNC: 4.7 G/DL
ALP BLD-CCNC: 58 U/L
ALT SERPL-CCNC: 24 U/L
ANION GAP SERPL CALC-SCNC: 15 MMOL/L
AST SERPL-CCNC: 28 U/L
BASOPHILS # BLD AUTO: 0.07 K/UL
BASOPHILS NFR BLD AUTO: 1.7 %
BILIRUB SERPL-MCNC: 0.5 MG/DL
BILIRUB UR QL STRIP: NORMAL
BUN SERPL-MCNC: 13 MG/DL
CALCIUM SERPL-MCNC: 9.4 MG/DL
CHLORIDE SERPL-SCNC: 101 MMOL/L
CK SERPL-CCNC: 114 U/L
CLARITY UR: CLEAR
CO2 SERPL-SCNC: 24 MMOL/L
COLLECTION METHOD: NORMAL
CREAT SERPL-MCNC: 0.45 MG/DL
CRP SERPL-MCNC: <0.1 MG/DL
EOSINOPHIL # BLD AUTO: 0.3 K/UL
EOSINOPHIL NFR BLD AUTO: 7.3 %
ERYTHROCYTE [SEDIMENTATION RATE] IN BLOOD BY WESTERGREN METHOD: 3 MM/HR
GLUCOSE SERPL-MCNC: 239 MG/DL
GLUCOSE UR-MCNC: NORMAL
HCG UR QL: 0.2 EU/DL
HCT VFR BLD CALC: 43.5 %
HGB BLD-MCNC: 14.5 G/DL
HGB UR QL STRIP.AUTO: NORMAL
IMM GRANULOCYTES NFR BLD AUTO: 0.2 %
KETONES UR-MCNC: NORMAL
LEUKOCYTE ESTERASE UR QL STRIP: NORMAL
LYMPHOCYTES # BLD AUTO: 0.91 K/UL
LYMPHOCYTES NFR BLD AUTO: 22.2 %
MAN DIFF?: NORMAL
MCHC RBC-ENTMCNC: 30.1 PG
MCHC RBC-ENTMCNC: 33.3 GM/DL
MCV RBC AUTO: 90.2 FL
MONOCYTES # BLD AUTO: 0.48 K/UL
MONOCYTES NFR BLD AUTO: 11.7 %
NEUTROPHILS # BLD AUTO: 2.33 K/UL
NEUTROPHILS NFR BLD AUTO: 56.9 %
NITRITE UR QL STRIP: NORMAL
PH UR STRIP: 6
PHOSPHATE SERPL-MCNC: 3 MG/DL
PLATELET # BLD AUTO: 165 K/UL
POTASSIUM SERPL-SCNC: 4.1 MMOL/L
PROT SERPL-MCNC: 6.7 G/DL
PROT UR STRIP-MCNC: NORMAL
RBC # BLD: 4.82 M/UL
RBC # FLD: 14.7 %
RHEUMATOID FACT SER QL: <10 IU/ML
SODIUM SERPL-SCNC: 140 MMOL/L
SP GR UR STRIP: 1.01
WBC # FLD AUTO: 4.1 K/UL

## 2018-11-15 LAB
CCP AB SER IA-ACNC: <8 UNITS
ENA SS-A AB SER IA-ACNC: <0.2 AL
ENA SS-B AB SER IA-ACNC: <0.2 AL
RF+CCP IGG SER-IMP: NEGATIVE

## 2018-11-16 ENCOUNTER — APPOINTMENT (OUTPATIENT)
Dept: GASTROENTEROLOGY | Facility: CLINIC | Age: 73
End: 2018-11-16
Payer: MEDICARE

## 2018-11-16 VITALS
HEIGHT: 63 IN | SYSTOLIC BLOOD PRESSURE: 132 MMHG | WEIGHT: 95 LBS | DIASTOLIC BLOOD PRESSURE: 80 MMHG | RESPIRATION RATE: 15 BRPM | HEART RATE: 94 BPM | BODY MASS INDEX: 16.83 KG/M2 | OXYGEN SATURATION: 97 %

## 2018-11-16 DIAGNOSIS — M75.51 BURSITIS OF RIGHT SHOULDER: ICD-10-CM

## 2018-11-16 DIAGNOSIS — M75.52 BURSITIS OF RIGHT SHOULDER: ICD-10-CM

## 2018-11-16 PROCEDURE — 99203 OFFICE O/P NEW LOW 30 MIN: CPT

## 2018-11-16 RX ORDER — CETIRIZINE HYDROCHLORIDE 10 MG/1
10 TABLET, FILM COATED ORAL
Qty: 30 | Refills: 1 | Status: DISCONTINUED | COMMUNITY
Start: 2018-06-13 | End: 2018-11-16

## 2018-12-05 ENCOUNTER — FORM ENCOUNTER (OUTPATIENT)
Age: 73
End: 2018-12-05

## 2018-12-06 ENCOUNTER — OUTPATIENT (OUTPATIENT)
Dept: OUTPATIENT SERVICES | Facility: HOSPITAL | Age: 73
LOS: 1 days | End: 2018-12-06
Payer: MEDICARE

## 2018-12-06 DIAGNOSIS — Z96.649 PRESENCE OF UNSPECIFIED ARTIFICIAL HIP JOINT: Chronic | ICD-10-CM

## 2018-12-06 DIAGNOSIS — R13.10 DYSPHAGIA, UNSPECIFIED: ICD-10-CM

## 2018-12-06 PROCEDURE — 74220 X-RAY XM ESOPHAGUS 1CNTRST: CPT | Mod: 26

## 2018-12-06 PROCEDURE — 74220 X-RAY XM ESOPHAGUS 1CNTRST: CPT

## 2019-01-31 ENCOUNTER — APPOINTMENT (OUTPATIENT)
Dept: RHEUMATOLOGY | Facility: CLINIC | Age: 74
End: 2019-01-31

## 2019-02-06 ENCOUNTER — RESULT REVIEW (OUTPATIENT)
Age: 74
End: 2019-02-06

## 2019-02-06 ENCOUNTER — APPOINTMENT (OUTPATIENT)
Dept: GASTROENTEROLOGY | Facility: GI CENTER | Age: 74
End: 2019-02-06
Payer: MEDICARE

## 2019-02-06 ENCOUNTER — OUTPATIENT (OUTPATIENT)
Dept: OUTPATIENT SERVICES | Facility: HOSPITAL | Age: 74
LOS: 1 days | End: 2019-02-06
Payer: MEDICARE

## 2019-02-06 DIAGNOSIS — R13.10 DYSPHAGIA, UNSPECIFIED: ICD-10-CM

## 2019-02-06 DIAGNOSIS — Z96.649 PRESENCE OF UNSPECIFIED ARTIFICIAL HIP JOINT: Chronic | ICD-10-CM

## 2019-02-06 LAB — GLUCOSE BLDC GLUCOMTR-MCNC: 118 MG/DL — HIGH (ref 70–99)

## 2019-02-06 PROCEDURE — 88305 TISSUE EXAM BY PATHOLOGIST: CPT | Mod: 26

## 2019-02-06 PROCEDURE — 88342 IMHCHEM/IMCYTCHM 1ST ANTB: CPT | Mod: 26

## 2019-02-06 PROCEDURE — 43239 EGD BIOPSY SINGLE/MULTIPLE: CPT

## 2019-02-06 PROCEDURE — 82962 GLUCOSE BLOOD TEST: CPT

## 2019-02-06 NOTE — PROCEDURE
[With Biopsy] : with biopsy [Dyspepsia] : dyspepsia [Heartburn] : heartburn [Procedure Explained] : The procedure was explained [Allergies Reviewed] : allergies reviewed. [Patient] : patient [Risks] : Risks [Benefits] : benefits [Alternatives] : alternatives [Consent Obtained] : written consent was obtained prior to the procedure and is detailed in the patient's record [Automated Blood Pressure Cuff] : automated blood pressure cuff [Cardiac Monitor] : cardiac monitor [Pulse Oximeter] : pulse oximeter [Normal] : Normal [Sent to Pathology] : was sent to pathology for analysis [Tolerated Well] : the patient tolerated the procedure well [Vital Signs Stable] : the vital signs were stable [de-identified] : dysphagia [de-identified] : biopsy taken for H Pylori [de-identified] : biopsy taken for H Pylori [de-identified] : 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 She will continue pantoprazole. Colonoscopy later this year.of this year. F/U in office in 2-3months

## 2019-02-06 NOTE — PROCEDURE
[With Biopsy] : with biopsy [Dyspepsia] : dyspepsia [Heartburn] : heartburn [Procedure Explained] : The procedure was explained [Allergies Reviewed] : allergies reviewed. [Patient] : patient [Risks] : Risks [Benefits] : benefits [Alternatives] : alternatives [Consent Obtained] : written consent was obtained prior to the procedure and is detailed in the patient's record [Automated Blood Pressure Cuff] : automated blood pressure cuff [Cardiac Monitor] : cardiac monitor [Pulse Oximeter] : pulse oximeter [Normal] : Normal [Sent to Pathology] : was sent to pathology for analysis [Tolerated Well] : the patient tolerated the procedure well [Vital Signs Stable] : the vital signs were stable [de-identified] : dysphagia [de-identified] : biopsy taken for H Pylori [de-identified] : biopsy taken for H Pylori [de-identified] : 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 She will continue pantoprazole. Colonoscopy later this year.of this year. F/U in office in 2-3months

## 2019-02-06 NOTE — PHYSICAL EXAM
[General Appearance - Alert] : alert [General Appearance - In No Acute Distress] : in no acute distress [General Appearance - Well Nourished] : well nourished [General Appearance - Well Developed] : well developed [General Appearance - Well-Appearing] : healthy appearing [Sclera] : the sclera and conjunctiva were normal [PERRL With Normal Accommodation] : pupils were equal in size, round, and reactive to light [Extraocular Movements] : extraocular movements were intact [Outer Ear] : the ears and nose were normal in appearance [Hearing Threshold Finger Rub Not [place]] : hearing was normal [Examination Of The Oral Cavity] : the lips and gums were normal [Oropharynx] : the oropharynx was normal [Neck Appearance] : the appearance of the neck was normal [Auscultation Breath Sounds / Voice Sounds] : lungs were clear to auscultation bilaterally [Heart Rate And Rhythm] : heart rate was normal and rhythm regular [Heart Sounds] : normal S1 and S2 [Heart Sounds Gallop] : no gallops [Murmurs] : no murmurs [Heart Sounds Pericardial Friction Rub] : no pericardial rub [Bowel Sounds] : normal bowel sounds [Abdomen Soft] : soft [Abdomen Tenderness] : non-tender [Abdomen Mass (___ Cm)] : no abdominal mass palpated [Abnormal Walk] : normal gait [Nail Clubbing] : no clubbing  or cyanosis of the fingernails [Musculoskeletal - Swelling] : no joint swelling seen [Motor Tone] : muscle strength and tone were normal [Skin Color & Pigmentation] : normal skin color and pigmentation [Skin Turgor] : normal skin turgor [] : no rash [Skin Lesions] : no skin lesions [Motor Exam] : the motor exam was normal [No Focal Deficits] : no focal deficits [Oriented To Time, Place, And Person] : oriented to person, place, and time [Impaired Insight] : insight and judgment were intact [Affect] : the affect was normal

## 2019-02-06 NOTE — ASSESSMENT
[FreeTextEntry1] : The previous encounters have been reviewed and there are no significant changes other those those items which were stated above. The patient's current medical status is stable for the procedure(s) to be performed today.\par

## 2019-02-06 NOTE — PHYSICAL EXAM
[General Appearance - Alert] : alert [General Appearance - In No Acute Distress] : in no acute distress [General Appearance - Well Nourished] : well nourished [General Appearance - Well Developed] : well developed [General Appearance - Well-Appearing] : healthy appearing [Sclera] : the sclera and conjunctiva were normal [PERRL With Normal Accommodation] : pupils were equal in size, round, and reactive to light [Extraocular Movements] : extraocular movements were intact [Outer Ear] : the ears and nose were normal in appearance [Hearing Threshold Finger Rub Not [place]] : hearing was normal [Examination Of The Oral Cavity] : the lips and gums were normal [Oropharynx] : the oropharynx was normal [Neck Appearance] : the appearance of the neck was normal [Auscultation Breath Sounds / Voice Sounds] : lungs were clear to auscultation bilaterally [Heart Rate And Rhythm] : heart rate was normal and rhythm regular [Heart Sounds] : normal S1 and S2 [Heart Sounds Gallop] : no gallops [Murmurs] : no murmurs [Heart Sounds Pericardial Friction Rub] : no pericardial rub [Bowel Sounds] : normal bowel sounds [Abdomen Soft] : soft [Abdomen Tenderness] : non-tender [Abdomen Mass (___ Cm)] : no abdominal mass palpated [Abnormal Walk] : normal gait [Nail Clubbing] : no clubbing  or cyanosis of the fingernails [Musculoskeletal - Swelling] : no joint swelling seen [Motor Tone] : muscle strength and tone were normal [Skin Color & Pigmentation] : normal skin color and pigmentation [Skin Turgor] : normal skin turgor [] : no rash [Skin Lesions] : no skin lesions [Motor Exam] : the motor exam was normal [No Focal Deficits] : no focal deficits [Oriented To Time, Place, And Person] : oriented to person, place, and time [Impaired Insight] : insight and judgment were intact [Affect] : the affect was normal

## 2019-02-09 ENCOUNTER — RX RENEWAL (OUTPATIENT)
Age: 74
End: 2019-02-09

## 2019-02-14 LAB — SURGICAL PATHOLOGY STUDY: SIGNIFICANT CHANGE UP

## 2019-02-21 ENCOUNTER — APPOINTMENT (OUTPATIENT)
Dept: RHEUMATOLOGY | Facility: CLINIC | Age: 74
End: 2019-02-21
Payer: MEDICARE

## 2019-02-21 PROCEDURE — 96372 THER/PROPH/DIAG INJ SC/IM: CPT

## 2019-02-28 ENCOUNTER — APPOINTMENT (OUTPATIENT)
Dept: RHEUMATOLOGY | Facility: CLINIC | Age: 74
End: 2019-02-28
Payer: MEDICARE

## 2019-02-28 VITALS
TEMPERATURE: 98.7 F | BODY MASS INDEX: 16.83 KG/M2 | HEART RATE: 87 BPM | HEIGHT: 63 IN | WEIGHT: 95 LBS | SYSTOLIC BLOOD PRESSURE: 100 MMHG | OXYGEN SATURATION: 97 % | RESPIRATION RATE: 16 BRPM | DIASTOLIC BLOOD PRESSURE: 70 MMHG

## 2019-02-28 DIAGNOSIS — M75.21 BICIPITAL TENDINITIS, RIGHT SHOULDER: ICD-10-CM

## 2019-02-28 DIAGNOSIS — M75.22 BICIPITAL TENDINITIS, LEFT SHOULDER: ICD-10-CM

## 2019-02-28 PROCEDURE — 99214 OFFICE O/P EST MOD 30 MIN: CPT | Mod: 25

## 2019-02-28 PROCEDURE — 81003 URINALYSIS AUTO W/O SCOPE: CPT | Mod: QW

## 2019-02-28 PROCEDURE — 36415 COLL VENOUS BLD VENIPUNCTURE: CPT

## 2019-03-02 LAB
ALBUMIN SERPL ELPH-MCNC: 4.8 G/DL
ALP BLD-CCNC: 75 U/L
ALT SERPL-CCNC: 23 U/L
ANION GAP SERPL CALC-SCNC: 14 MMOL/L
AST SERPL-CCNC: 30 U/L
BASOPHILS # BLD AUTO: 0.05 K/UL
BASOPHILS NFR BLD AUTO: 1.2 %
BILIRUB SERPL-MCNC: 0.4 MG/DL
BILIRUB UR QL STRIP: NORMAL
BUN SERPL-MCNC: 16 MG/DL
CALCIUM SERPL-MCNC: 9.5 MG/DL
CHLORIDE SERPL-SCNC: 104 MMOL/L
CK SERPL-CCNC: 146 U/L
CLARITY UR: CLEAR
CO2 SERPL-SCNC: 26 MMOL/L
COLLECTION METHOD: NORMAL
CREAT SERPL-MCNC: 0.43 MG/DL
CRP SERPL-MCNC: <0.1 MG/DL
EOSINOPHIL # BLD AUTO: 0.29 K/UL
EOSINOPHIL NFR BLD AUTO: 6.8 %
ERYTHROCYTE [SEDIMENTATION RATE] IN BLOOD BY WESTERGREN METHOD: 4 MM/HR
GLUCOSE SERPL-MCNC: 173 MG/DL
GLUCOSE UR-MCNC: NORMAL
HCG UR QL: 0.2 EU/DL
HCT VFR BLD CALC: 43.8 %
HGB BLD-MCNC: 13.5 G/DL
HGB UR QL STRIP.AUTO: NORMAL
IMM GRANULOCYTES NFR BLD AUTO: 0.2 %
KETONES UR-MCNC: NORMAL
LEUKOCYTE ESTERASE UR QL STRIP: NORMAL
LYMPHOCYTES # BLD AUTO: 1.16 K/UL
LYMPHOCYTES NFR BLD AUTO: 27.4 %
MAN DIFF?: NORMAL
MCHC RBC-ENTMCNC: 29.1 PG
MCHC RBC-ENTMCNC: 30.8 GM/DL
MCV RBC AUTO: 94.4 FL
MONOCYTES # BLD AUTO: 0.4 K/UL
MONOCYTES NFR BLD AUTO: 9.4 %
NEUTROPHILS # BLD AUTO: 2.33 K/UL
NEUTROPHILS NFR BLD AUTO: 55 %
NITRITE UR QL STRIP: NORMAL
PH UR STRIP: 6
PHOSPHATE SERPL-MCNC: 3.6 MG/DL
PLATELET # BLD AUTO: 174 K/UL
POTASSIUM SERPL-SCNC: 3.9 MMOL/L
PROT SERPL-MCNC: 6.8 G/DL
PROT UR STRIP-MCNC: NORMAL
RBC # BLD: 4.64 M/UL
RBC # FLD: 14.1 %
SODIUM SERPL-SCNC: 143 MMOL/L
SP GR UR STRIP: 1.02
WBC # FLD AUTO: 4.24 K/UL

## 2019-03-04 ENCOUNTER — TRANSCRIPTION ENCOUNTER (OUTPATIENT)
Age: 74
End: 2019-03-04

## 2019-03-07 ENCOUNTER — MEDICATION RENEWAL (OUTPATIENT)
Age: 74
End: 2019-03-07

## 2019-03-21 ENCOUNTER — APPOINTMENT (OUTPATIENT)
Dept: GASTROENTEROLOGY | Facility: CLINIC | Age: 74
End: 2019-03-21
Payer: MEDICARE

## 2019-03-21 VITALS
SYSTOLIC BLOOD PRESSURE: 120 MMHG | DIASTOLIC BLOOD PRESSURE: 80 MMHG | HEIGHT: 63 IN | BODY MASS INDEX: 17.01 KG/M2 | WEIGHT: 96 LBS | HEART RATE: 80 BPM

## 2019-03-21 PROCEDURE — 99214 OFFICE O/P EST MOD 30 MIN: CPT

## 2019-03-21 NOTE — PHYSICAL EXAM
[General Appearance - Alert] : alert [General Appearance - In No Acute Distress] : in no acute distress [General Appearance - Well Nourished] : well nourished [General Appearance - Well Developed] : well developed [General Appearance - Well-Appearing] : healthy appearing [Sclera] : the sclera and conjunctiva were normal [PERRL With Normal Accommodation] : pupils were equal in size, round, and reactive to light [Extraocular Movements] : extraocular movements were intact [Outer Ear] : the ears and nose were normal in appearance [Hearing Threshold Finger Rub Not [place]] : hearing was normal [Examination Of The Oral Cavity] : the lips and gums were normal [Oropharynx] : the oropharynx was normal [Neck Appearance] : the appearance of the neck was normal [Auscultation Breath Sounds / Voice Sounds] : lungs were clear to auscultation bilaterally [Heart Rate And Rhythm] : heart rate was normal and rhythm regular [Heart Sounds] : normal S1 and S2 [Heart Sounds Gallop] : no gallops [Murmurs] : no murmurs [Heart Sounds Pericardial Friction Rub] : no pericardial rub [Bowel Sounds] : normal bowel sounds [Abdomen Soft] : soft [Abdomen Tenderness] : non-tender [Abdomen Mass (___ Cm)] : no abdominal mass palpated [Abnormal Walk] : normal gait [Nail Clubbing] : no clubbing  or cyanosis of the fingernails [Musculoskeletal - Swelling] : no joint swelling seen [Motor Tone] : muscle strength and tone were normal [Skin Color & Pigmentation] : normal skin color and pigmentation [Skin Turgor] : normal skin turgor [] : no rash [Skin Lesions] : no skin lesions [Motor Exam] : the motor exam was normal [No Focal Deficits] : no focal deficits [Oriented To Time, Place, And Person] : oriented to person, place, and time [Impaired Insight] : insight and judgment were intact [Affect] : the affect was normal

## 2019-03-27 ENCOUNTER — MEDICATION RENEWAL (OUTPATIENT)
Age: 74
End: 2019-03-27

## 2019-05-05 ENCOUNTER — RX RENEWAL (OUTPATIENT)
Age: 74
End: 2019-05-05

## 2019-05-07 ENCOUNTER — APPOINTMENT (OUTPATIENT)
Dept: GASTROENTEROLOGY | Facility: GI CENTER | Age: 74
End: 2019-05-07
Payer: MEDICARE

## 2019-05-07 ENCOUNTER — OUTPATIENT (OUTPATIENT)
Dept: OUTPATIENT SERVICES | Facility: HOSPITAL | Age: 74
LOS: 1 days | End: 2019-05-07
Payer: MEDICARE

## 2019-05-07 DIAGNOSIS — Z80.0 FAMILY HISTORY OF MALIGNANT NEOPLASM OF DIGESTIVE ORGANS: ICD-10-CM

## 2019-05-07 DIAGNOSIS — Z96.649 PRESENCE OF UNSPECIFIED ARTIFICIAL HIP JOINT: Chronic | ICD-10-CM

## 2019-05-07 LAB — GLUCOSE BLDC GLUCOMTR-MCNC: 94 MG/DL — SIGNIFICANT CHANGE UP (ref 70–99)

## 2019-05-07 PROCEDURE — 45378 DIAGNOSTIC COLONOSCOPY: CPT

## 2019-05-07 PROCEDURE — 45378 DIAGNOSTIC COLONOSCOPY: CPT | Mod: PT

## 2019-05-07 PROCEDURE — 82962 GLUCOSE BLOOD TEST: CPT

## 2019-05-07 NOTE — PROCEDURE
[Fm Hx of Colon Ca/Polyps] : family history of colon cancer and/or polyps [Change in Bowel Habits] : change in bowel habits [Allergies Reviewed] : allergies reviewed. [Procedure Explained] : The procedure was explained [Benefits] : benefits [Risks] : Risks [Alternatives] : alternatives [Consent Obtained] : written consent was obtained prior to the procedure and is detailed in the patient's record [Bleeding] : bleeding risk [Infection] : risk of infection [Bowel Prep Kit] : the patient took the appropriate bowel preparation kit as directed [Patient] : the patient [Approved Diet Followed] : the patient avoided solid foods and adhered to the approved diet list for 24 hours prior to the procedure [Automated Blood Pressure Cuff] : automated blood pressure cuff [Pulse Oximeter] : pulse oximeter [Cardiac Monitor] : cardiac monitor [Propofol ___ mg IV] : Propofol [unfilled] ~Umg intravenously [2] : 2 [Prep Qualtiy: ___] : Prep Quality:  [unfilled] [Withdrawal Time: ___] : Withdrawal Time:  [unfilled] [Left Lateral Decubitus] : The patient was positioned in the left lateral decubitus position [Cecum (Landmarks/Transillum)] : and guided to the cecum which was identified by the anatomic landmarks of the appendiceal orifice and ileocecal valve and by transillumination in the right lower quadrant [No Difficulty] : without difficulty [Insufflated] : insufflated [Single Pass Needed] : after a single pass [Patient Rotated Into Alternating Positions] : the patient was not rotated [Tolerated Well] : the patient tolerated the procedure well [Vital Signs Stable] : the vital signs were stable [Normal] : Normal [No Complications] : There were no complications [de-identified] : [id number] [de-identified] : f/u colon 5 years

## 2019-05-07 NOTE — PHYSICAL EXAM
[General Appearance - Well Nourished] : well nourished [General Appearance - Alert] : alert [General Appearance - In No Acute Distress] : in no acute distress [General Appearance - Well-Appearing] : healthy appearing [General Appearance - Well Developed] : well developed [Sclera] : the sclera and conjunctiva were normal [Extraocular Movements] : extraocular movements were intact [PERRL With Normal Accommodation] : pupils were equal in size, round, and reactive to light [Oropharynx] : the oropharynx was normal [Examination Of The Oral Cavity] : the lips and gums were normal [Hearing Threshold Finger Rub Not [place]] : hearing was normal [Outer Ear] : the ears and nose were normal in appearance [Neck Appearance] : the appearance of the neck was normal [Auscultation Breath Sounds / Voice Sounds] : lungs were clear to auscultation bilaterally [Heart Sounds] : normal S1 and S2 [Heart Rate And Rhythm] : heart rate was normal and rhythm regular [Murmurs] : no murmurs [Heart Sounds Gallop] : no gallops [Bowel Sounds] : normal bowel sounds [Heart Sounds Pericardial Friction Rub] : no pericardial rub [Abdomen Soft] : soft [Abdomen Mass (___ Cm)] : no abdominal mass palpated [Abdomen Tenderness] : non-tender [Nail Clubbing] : no clubbing  or cyanosis of the fingernails [Motor Tone] : muscle strength and tone were normal [Abnormal Walk] : normal gait [Musculoskeletal - Swelling] : no joint swelling seen [] : no rash [Skin Turgor] : normal skin turgor [Skin Color & Pigmentation] : normal skin color and pigmentation [Skin Lesions] : no skin lesions [No Focal Deficits] : no focal deficits [Motor Exam] : the motor exam was normal [Impaired Insight] : insight and judgment were intact [Affect] : the affect was normal [Oriented To Time, Place, And Person] : oriented to person, place, and time

## 2019-05-07 NOTE — PROCEDURE
[Change in Bowel Habits] : change in bowel habits [Fm Hx of Colon Ca/Polyps] : family history of colon cancer and/or polyps [Procedure Explained] : The procedure was explained [Allergies Reviewed] : allergies reviewed. [Risks] : Risks [Benefits] : benefits [Alternatives] : alternatives [Bleeding] : bleeding risk [Consent Obtained] : written consent was obtained prior to the procedure and is detailed in the patient's record [Infection] : risk of infection [Approved Diet Followed] : the patient avoided solid foods and adhered to the approved diet list for 24 hours prior to the procedure [Bowel Prep Kit] : the patient took the appropriate bowel preparation kit as directed [Patient] : the patient [Pulse Oximeter] : pulse oximeter [Automated Blood Pressure Cuff] : automated blood pressure cuff [Cardiac Monitor] : cardiac monitor [Propofol ___ mg IV] : Propofol [unfilled] ~Umg intravenously [2] : 2 [Prep Qualtiy: ___] : Prep Quality:  [unfilled] [Withdrawal Time: ___] : Withdrawal Time:  [unfilled] [Left Lateral Decubitus] : The patient was positioned in the left lateral decubitus position [Cecum (Landmarks/Transillum)] : and guided to the cecum which was identified by the anatomic landmarks of the appendiceal orifice and ileocecal valve and by transillumination in the right lower quadrant [No Difficulty] : without difficulty [Insufflated] : insufflated [Single Pass Needed] : after a single pass [Patient Rotated Into Alternating Positions] : the patient was not rotated [Tolerated Well] : the patient tolerated the procedure well [Vital Signs Stable] : the vital signs were stable [Normal] : Normal [No Complications] : There were no complications [de-identified] : [id number] [de-identified] : f/u colon 5 years

## 2019-05-07 NOTE — ASSESSMENT
[FreeTextEntry1] : At this time I believe that the patient's episode of dysphagia was probably due to acid reflux induced esophageal spasm which has resolved with use of omeprazole in which she should continue for the present time. She would have been due for a followup colonoscopy in September of this year but I will proceed somewhat earlier due to the recent change in her bowel habits. She will continue to take Benefiber at the current dose. The risks, benefits, complications and possible adverse consequences associated with colonoscopy were discussed with the patient. \par \par

## 2019-05-07 NOTE — PHYSICAL EXAM
[General Appearance - In No Acute Distress] : in no acute distress [General Appearance - Alert] : alert [General Appearance - Well Nourished] : well nourished [General Appearance - Well-Appearing] : healthy appearing [General Appearance - Well Developed] : well developed [Sclera] : the sclera and conjunctiva were normal [Extraocular Movements] : extraocular movements were intact [PERRL With Normal Accommodation] : pupils were equal in size, round, and reactive to light [Outer Ear] : the ears and nose were normal in appearance [Hearing Threshold Finger Rub Not [place]] : hearing was normal [Examination Of The Oral Cavity] : the lips and gums were normal [Oropharynx] : the oropharynx was normal [Neck Appearance] : the appearance of the neck was normal [Auscultation Breath Sounds / Voice Sounds] : lungs were clear to auscultation bilaterally [Heart Sounds] : normal S1 and S2 [Heart Rate And Rhythm] : heart rate was normal and rhythm regular [Murmurs] : no murmurs [Heart Sounds Gallop] : no gallops [Abdomen Soft] : soft [Bowel Sounds] : normal bowel sounds [Heart Sounds Pericardial Friction Rub] : no pericardial rub [Abdomen Tenderness] : non-tender [Abdomen Mass (___ Cm)] : no abdominal mass palpated [Musculoskeletal - Swelling] : no joint swelling seen [Abnormal Walk] : normal gait [Motor Tone] : muscle strength and tone were normal [Nail Clubbing] : no clubbing  or cyanosis of the fingernails [Skin Turgor] : normal skin turgor [Skin Color & Pigmentation] : normal skin color and pigmentation [] : no rash [No Focal Deficits] : no focal deficits [Motor Exam] : the motor exam was normal [Skin Lesions] : no skin lesions [Affect] : the affect was normal [Impaired Insight] : insight and judgment were intact [Oriented To Time, Place, And Person] : oriented to person, place, and time

## 2019-05-07 NOTE — HISTORY OF PRESENT ILLNESS
[de-identified] : In June of last year year she was placed on Etodilac and within 2 or 3 days she began to experience epigastric burning which radiated to her chest. The medication was discontinued and she was placed on pantoprazole 40 mg p.o. q.a.m. on which she has continued. Within 1-2 weeks burning pain resolved and has not recurred. In the fall of last year she has had 2 episodes when solid bolus of food appeared to transiently lodged in the upper chest region and then passed after the ingestion of some fluid. A barium esophagram revealed tertiary contractions within the esophagus with significant intraesophageal reflux. In February of this year an upper endoscopy was essentially normal and biopsies were unremarkable. She continues to take omeprazole 40 mg p.o. q.a.m. and denies any further heartburn or dysphagia. There is no abdominal pain, nausea or vomiting.  Her appetite and weight are stable. Subsequent to the upper endoscopy she began to experience softer as well as occasionally loose bowel movements. She also noted increased borborygmi as well as increased mucus in her stool. She was started on Benefiber one heaping tablespoon in the morning and one half tablespoon in the afternoon. During the course of the past 3-4 weeks her bowel movements have almost completely returned to normal. There is no rectal bleeding or melena. Her father had colon cancer and she last underwent a colonoscopy in September of 2014 which was normal.

## 2019-05-23 ENCOUNTER — APPOINTMENT (OUTPATIENT)
Dept: RHEUMATOLOGY | Facility: CLINIC | Age: 74
End: 2019-05-23

## 2019-07-05 ENCOUNTER — APPOINTMENT (OUTPATIENT)
Dept: GASTROENTEROLOGY | Facility: CLINIC | Age: 74
End: 2019-07-05
Payer: MEDICARE

## 2019-07-05 VITALS
HEIGHT: 63 IN | WEIGHT: 93 LBS | BODY MASS INDEX: 16.48 KG/M2 | RESPIRATION RATE: 16 BRPM | DIASTOLIC BLOOD PRESSURE: 61 MMHG | SYSTOLIC BLOOD PRESSURE: 109 MMHG | OXYGEN SATURATION: 98 % | HEART RATE: 76 BPM

## 2019-07-05 DIAGNOSIS — R10.32 RIGHT LOWER QUADRANT PAIN: ICD-10-CM

## 2019-07-05 DIAGNOSIS — R10.31 RIGHT LOWER QUADRANT PAIN: ICD-10-CM

## 2019-07-05 DIAGNOSIS — R19.4 CHANGE IN BOWEL HABIT: ICD-10-CM

## 2019-07-05 PROCEDURE — 99214 OFFICE O/P EST MOD 30 MIN: CPT

## 2019-07-05 NOTE — HISTORY OF PRESENT ILLNESS
[de-identified] : In June of last year year she was placed on Etodilac and within 2 or 3 days she began to experience epigastric burning which radiated to her chest. The medication was discontinued and she was placed on pantoprazole 40 mg p.o. q.a.m. on which she has continued. Within 1-2 weeks burning pain resolved and has not recurred. In the fall of last year she has had 2 episodes when solid bolus of food appeared to transiently lodged in the upper chest region and then passed after the ingestion of some fluid. A barium esophagram revealed tertiary contractions within the esophagus with significant intraesophageal reflux. In February of this year an upper endoscopy was essentially normal and biopsies were unremarkable. She continues to take omeprazole 40 mg p.o. q.a.m. and denies any further heartburn or dysphagia. There is no abdominal pain, nausea or vomiting.  Her appetite and weight are stable. Subsequent to the upper endoscopy she began to experience softer as well as occasionally loose bowel movements. She also noted increased borborygmi as well as increased mucus in her stool. She was started on Benefiber one heaping tablespoon in the morning and one half tablespoon in the afternoon. Her symptoms transiently resolved only to recur just have to a followup colonoscopy performed in early May of this year due to a strong family history of colon cancer which was normal. She is now experiencing a daily bowel movement in 2 out of every 3 days but about twice weekly she will experience some lower abdominal cramping as well as bloating and several soft stools. There are no particular inciting or alleviating factors. She continues to take Benefiber one heaping tablespoon daily. In addition she continues to take a daily proton pump inhibitor and her primary care physician would like her to stop taking the medication due to the risk of osteoporosis. She denies any heartburn or dysphagia.

## 2019-07-05 NOTE — PHYSICAL EXAM
[General Appearance - In No Acute Distress] : in no acute distress [General Appearance - Alert] : alert [General Appearance - Well Nourished] : well nourished [General Appearance - Well Developed] : well developed [General Appearance - Well-Appearing] : healthy appearing [PERRL With Normal Accommodation] : pupils were equal in size, round, and reactive to light [Extraocular Movements] : extraocular movements were intact [Sclera] : the sclera and conjunctiva were normal [Examination Of The Oral Cavity] : the lips and gums were normal [Outer Ear] : the ears and nose were normal in appearance [Hearing Threshold Finger Rub Not [place]] : hearing was normal [Oropharynx] : the oropharynx was normal [Neck Appearance] : the appearance of the neck was normal [Heart Rate And Rhythm] : heart rate was normal and rhythm regular [Abdomen Tenderness] : non-tender [Bowel Sounds] : normal bowel sounds [Abdomen Soft] : soft [Abnormal Walk] : normal gait [Abdomen Mass (___ Cm)] : no abdominal mass palpated [Motor Tone] : muscle strength and tone were normal [Skin Color & Pigmentation] : normal skin color and pigmentation [Nail Clubbing] : no clubbing  or cyanosis of the fingernails [Musculoskeletal - Swelling] : no joint swelling seen [Skin Turgor] : normal skin turgor [] : no rash [Skin Lesions] : no skin lesions [Oriented To Time, Place, And Person] : oriented to person, place, and time [Impaired Insight] : insight and judgment were intact [Motor Exam] : the motor exam was normal [No Focal Deficits] : no focal deficits [Affect] : the affect was normal

## 2019-07-05 NOTE — ASSESSMENT
[FreeTextEntry1] : At this time I still believe that the predominance of her symptoms are due to the onset of irritable bowel syndrome. She believes that the Benefiber is of no help and may be contributing to her gas and bloating and will therefore be discontinued. She'll obtain a CAT scan of the abdomen and pelvis with oral and IV contrast. I have also recommended that she appeared to a diet which is low in FODMAP's and a list was supplied to her. She will discontinue the proton pump inhibitor and replace the medication with famotidine 20 mg p.o. b.i.d. She will followup with me in 4 weeks. She does not require another colonoscopy for 5 years.

## 2019-07-08 ENCOUNTER — FORM ENCOUNTER (OUTPATIENT)
Age: 74
End: 2019-07-08

## 2019-07-09 ENCOUNTER — APPOINTMENT (OUTPATIENT)
Dept: CT IMAGING | Facility: CLINIC | Age: 74
End: 2019-07-09
Payer: MEDICARE

## 2019-07-09 ENCOUNTER — OUTPATIENT (OUTPATIENT)
Dept: OUTPATIENT SERVICES | Facility: HOSPITAL | Age: 74
LOS: 1 days | End: 2019-07-09
Payer: MEDICARE

## 2019-07-09 DIAGNOSIS — R19.4 CHANGE IN BOWEL HABIT: ICD-10-CM

## 2019-07-09 DIAGNOSIS — Z96.649 PRESENCE OF UNSPECIFIED ARTIFICIAL HIP JOINT: Chronic | ICD-10-CM

## 2019-07-09 PROCEDURE — 74177 CT ABD & PELVIS W/CONTRAST: CPT | Mod: 26

## 2019-07-09 PROCEDURE — 82565 ASSAY OF CREATININE: CPT

## 2019-07-09 PROCEDURE — 74177 CT ABD & PELVIS W/CONTRAST: CPT

## 2019-07-22 ENCOUNTER — OTHER (OUTPATIENT)
Age: 74
End: 2019-07-22

## 2019-08-08 ENCOUNTER — APPOINTMENT (OUTPATIENT)
Dept: GASTROENTEROLOGY | Facility: CLINIC | Age: 74
End: 2019-08-08
Payer: MEDICARE

## 2019-08-08 VITALS
DIASTOLIC BLOOD PRESSURE: 77 MMHG | HEART RATE: 84 BPM | HEIGHT: 63 IN | SYSTOLIC BLOOD PRESSURE: 129 MMHG | WEIGHT: 92 LBS | BODY MASS INDEX: 16.3 KG/M2

## 2019-08-08 PROCEDURE — 99214 OFFICE O/P EST MOD 30 MIN: CPT

## 2019-08-08 RX ORDER — PANTOPRAZOLE 40 MG/1
40 TABLET, DELAYED RELEASE ORAL
Refills: 0 | Status: DISCONTINUED | COMMUNITY
End: 2019-08-08

## 2019-08-08 NOTE — PHYSICAL EXAM
[General Appearance - Alert] : alert [General Appearance - Well Nourished] : well nourished [General Appearance - In No Acute Distress] : in no acute distress [General Appearance - Well Developed] : well developed [General Appearance - Well-Appearing] : healthy appearing [Sclera] : the sclera and conjunctiva were normal [PERRL With Normal Accommodation] : pupils were equal in size, round, and reactive to light [Outer Ear] : the ears and nose were normal in appearance [Extraocular Movements] : extraocular movements were intact [Hearing Threshold Finger Rub Not [place]] : hearing was normal [Oropharynx] : the oropharynx was normal [Examination Of The Oral Cavity] : the lips and gums were normal [Neck Appearance] : the appearance of the neck was normal [Heart Rate And Rhythm] : heart rate was normal and rhythm regular [Bowel Sounds] : normal bowel sounds [Abdomen Soft] : soft [Abdomen Tenderness] : non-tender [Abdomen Mass (___ Cm)] : no abdominal mass palpated [Abnormal Walk] : normal gait [Nail Clubbing] : no clubbing  or cyanosis of the fingernails [Musculoskeletal - Swelling] : no joint swelling seen [Motor Tone] : muscle strength and tone were normal [Skin Color & Pigmentation] : normal skin color and pigmentation [Skin Turgor] : normal skin turgor [Skin Lesions] : no skin lesions [] : no rash [No Focal Deficits] : no focal deficits [Motor Exam] : the motor exam was normal [Impaired Insight] : insight and judgment were intact [Oriented To Time, Place, And Person] : oriented to person, place, and time [Affect] : the affect was normal

## 2019-08-08 NOTE — ASSESSMENT
[FreeTextEntry1] : At this time she is doing well will simply continue the same medical regimen. She will not need another colonoscopy until May of 2024. She should continue with her current diet as well as a ranitidine twice daily and will be seen again in 6 months for further followup.In one year she'll also undergo an MRI of the pancreas.

## 2019-08-08 NOTE — HISTORY OF PRESENT ILLNESS
[de-identified] : Since her last visit she underwent a CAT scan of the abdomen and pelvis with oral and IV contrast. They were gallstones present. 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 There was mild thickening of the left adrenal gland. There was streak artifact due to the patient's hip prostheses. The bowel appeared normal throughout. There was some atherosclerosis. 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 MRI of the pancreas was recommended in one year to assess the pancreatic cyst possibility. She stopped taking famotidine due to leg cramping and replaced it with ranitidine 150 mg p.o. b.i.d. In February of this year an upper endoscopy was essentially normal and a colonoscopy in May was normal as well. At this time she is feeling much better than when last seen with no diarrhea or constipation on a low FODMAP diet and only rare episodes of mild heartburn without any dysphagia, nausea or vomiting. Although she is very thin her appetite and weight are stable. There is no abdominal pain whatsoever.

## 2019-08-11 ENCOUNTER — RX RENEWAL (OUTPATIENT)
Age: 74
End: 2019-08-11

## 2019-08-28 ENCOUNTER — RX RENEWAL (OUTPATIENT)
Age: 74
End: 2019-08-28

## 2019-09-06 ENCOUNTER — APPOINTMENT (OUTPATIENT)
Dept: RHEUMATOLOGY | Facility: CLINIC | Age: 74
End: 2019-09-06

## 2019-09-27 ENCOUNTER — APPOINTMENT (OUTPATIENT)
Dept: RHEUMATOLOGY | Facility: CLINIC | Age: 74
End: 2019-09-27
Payer: MEDICARE

## 2019-09-27 VITALS
WEIGHT: 95 LBS | DIASTOLIC BLOOD PRESSURE: 70 MMHG | HEIGHT: 63 IN | BODY MASS INDEX: 16.83 KG/M2 | RESPIRATION RATE: 17 BRPM | OXYGEN SATURATION: 98 % | SYSTOLIC BLOOD PRESSURE: 116 MMHG | TEMPERATURE: 98.3 F | HEART RATE: 78 BPM

## 2019-09-27 DIAGNOSIS — M79.18 MYALGIA, OTHER SITE: ICD-10-CM

## 2019-09-27 PROCEDURE — 99215 OFFICE O/P EST HI 40 MIN: CPT | Mod: 25

## 2019-09-27 PROCEDURE — 36415 COLL VENOUS BLD VENIPUNCTURE: CPT

## 2019-09-28 LAB
ALBUMIN SERPL ELPH-MCNC: 5.2 G/DL
ALP BLD-CCNC: 73 U/L
ALT SERPL-CCNC: 19 U/L
ANION GAP SERPL CALC-SCNC: 18 MMOL/L
APPEARANCE: CLEAR
AST SERPL-CCNC: 26 U/L
BACTERIA: NEGATIVE
BASOPHILS # BLD AUTO: 0.06 K/UL
BASOPHILS NFR BLD AUTO: 1.2 %
BILIRUB SERPL-MCNC: 0.5 MG/DL
BILIRUB UR QL STRIP: NORMAL
BILIRUBIN URINE: NEGATIVE
BLOOD URINE: NEGATIVE
BUN SERPL-MCNC: 12 MG/DL
CALCIUM SERPL-MCNC: 10.4 MG/DL
CHLORIDE SERPL-SCNC: 103 MMOL/L
CLARITY UR: CLEAR
CO2 SERPL-SCNC: 24 MMOL/L
COLLECTION METHOD: NORMAL
COLOR: YELLOW
CREAT SERPL-MCNC: 0.33 MG/DL
CRP SERPL-MCNC: <0.1 MG/DL
DEPRECATED KAPPA LC FREE/LAMBDA SER: 0.8 RATIO
EOSINOPHIL # BLD AUTO: 0.33 K/UL
EOSINOPHIL NFR BLD AUTO: 6.7 %
ERYTHROCYTE [SEDIMENTATION RATE] IN BLOOD BY WESTERGREN METHOD: 8 MM/HR
GLUCOSE QUALITATIVE U: NEGATIVE
GLUCOSE SERPL-MCNC: 120 MG/DL
GLUCOSE UR-MCNC: NORMAL
HCG UR QL: 0.2 EU/DL
HCT VFR BLD CALC: 47.1 %
HGB BLD-MCNC: 14.8 G/DL
HGB UR QL STRIP.AUTO: NORMAL
HYALINE CASTS: 0 /LPF
IGA SER QL IEP: 107 MG/DL
IGG SER QL IEP: 746 MG/DL
IGM SER QL IEP: 60 MG/DL
IMM GRANULOCYTES NFR BLD AUTO: 0.2 %
KAPPA LC CSF-MCNC: 0.83 MG/DL
KAPPA LC SERPL-MCNC: 0.66 MG/DL
KETONES UR-MCNC: NORMAL
KETONES URINE: NEGATIVE
LEUKOCYTE ESTERASE UR QL STRIP: NORMAL
LEUKOCYTE ESTERASE URINE: NEGATIVE
LYMPHOCYTES # BLD AUTO: 1.06 K/UL
LYMPHOCYTES NFR BLD AUTO: 21.5 %
M PROTEIN SPEC IFE-MCNC: NORMAL
MAGNESIUM SERPL-MCNC: 2.2 MG/DL
MAN DIFF?: NORMAL
MCHC RBC-ENTMCNC: 29.1 PG
MCHC RBC-ENTMCNC: 31.4 GM/DL
MCV RBC AUTO: 92.7 FL
MICROSCOPIC-UA: NORMAL
MONOCYTES # BLD AUTO: 0.62 K/UL
MONOCYTES NFR BLD AUTO: 12.6 %
NEUTROPHILS # BLD AUTO: 2.84 K/UL
NEUTROPHILS NFR BLD AUTO: 57.8 %
NITRITE UR QL STRIP: NORMAL
NITRITE URINE: NEGATIVE
PH UR STRIP: 7.5
PH URINE: 7.5
PHOSPHATE SERPL-MCNC: 3.3 MG/DL
PLATELET # BLD AUTO: 184 K/UL
POTASSIUM SERPL-SCNC: 4 MMOL/L
PROT SERPL-MCNC: 7 G/DL
PROT UR STRIP-MCNC: NORMAL
PROTEIN URINE: ABNORMAL
RBC # BLD: 5.08 M/UL
RBC # FLD: 14.2 %
RED BLOOD CELLS URINE: 0 /HPF
SODIUM SERPL-SCNC: 145 MMOL/L
SP GR UR STRIP: 1.01
SPECIFIC GRAVITY URINE: >=1.03
SQUAMOUS EPITHELIAL CELLS: 0 /HPF
TSH SERPL-ACNC: 1.81 UIU/ML
UROBILINOGEN URINE: NORMAL
WBC # FLD AUTO: 4.92 K/UL
WHITE BLOOD CELLS URINE: 1 /HPF

## 2019-09-29 RX ORDER — CALCIUM 500 MG
500 TABLET ORAL
Qty: 100 | Refills: 0 | Status: ACTIVE | COMMUNITY
Start: 2018-11-15

## 2019-09-29 RX ORDER — POLYETHYLENE GLYCOL 3350, SODIUM SULFATE, SODIUM CHLORIDE, POTASSIUM CHLORIDE, ASCORBIC ACID, SODIUM ASCORBATE 7.5-2.691G
100 KIT ORAL
Qty: 1 | Refills: 0 | Status: DISCONTINUED | COMMUNITY
Start: 2019-03-21 | End: 2019-09-29

## 2019-09-29 RX ORDER — DICLOFENAC SODIUM 10 MG/G
1 GEL TOPICAL
Qty: 1200 | Refills: 0 | Status: DISCONTINUED | COMMUNITY
Start: 2018-11-12 | End: 2019-09-29

## 2019-09-29 RX ORDER — DENOSUMAB 60 MG/ML
60 INJECTION SUBCUTANEOUS
Qty: 1 | Refills: 0 | Status: DISCONTINUED | COMMUNITY
Start: 2018-05-31 | End: 2019-09-29

## 2019-09-30 ENCOUNTER — APPOINTMENT (OUTPATIENT)
Dept: RHEUMATOLOGY | Facility: CLINIC | Age: 74
End: 2019-09-30
Payer: MEDICARE

## 2019-09-30 ENCOUNTER — MED ADMIN CHARGE (OUTPATIENT)
Age: 74
End: 2019-09-30

## 2019-09-30 PROCEDURE — 96372 THER/PROPH/DIAG INJ SC/IM: CPT

## 2019-09-30 RX ORDER — DENOSUMAB 60 MG/ML
60 INJECTION SUBCUTANEOUS
Qty: 1 | Refills: 0 | Status: COMPLETED | OUTPATIENT
Start: 2019-09-30

## 2019-10-01 ENCOUNTER — RX RENEWAL (OUTPATIENT)
Age: 74
End: 2019-10-01

## 2019-10-01 LAB
APPEARANCE: CLEAR
BACTERIA: NEGATIVE
BILIRUBIN URINE: ABNORMAL
BLOOD URINE: NEGATIVE
COLOR: ABNORMAL
GLUCOSE QUALITATIVE U: NEGATIVE
HYALINE CASTS: 0 /LPF
KETONES URINE: NORMAL
LEUKOCYTE ESTERASE URINE: NEGATIVE
MICROSCOPIC-UA: NORMAL
NITRITE URINE: NEGATIVE
PH URINE: 6
PROTEIN URINE: NEGATIVE
RED BLOOD CELLS URINE: 3 /HPF
SPECIFIC GRAVITY URINE: 1.02
SQUAMOUS EPITHELIAL CELLS: 3 /HPF
UROBILINOGEN URINE: NORMAL
WHITE BLOOD CELLS URINE: 4 /HPF

## 2019-10-09 ENCOUNTER — CLINICAL ADVICE (OUTPATIENT)
Age: 74
End: 2019-10-09

## 2019-10-17 ENCOUNTER — CLINICAL ADVICE (OUTPATIENT)
Age: 74
End: 2019-10-17

## 2020-01-04 ENCOUNTER — RX RENEWAL (OUTPATIENT)
Age: 75
End: 2020-01-04

## 2020-01-07 ENCOUNTER — MEDICATION RENEWAL (OUTPATIENT)
Age: 75
End: 2020-01-07

## 2020-01-08 ENCOUNTER — MEDICATION RENEWAL (OUTPATIENT)
Age: 75
End: 2020-01-08

## 2020-02-19 ENCOUNTER — APPOINTMENT (OUTPATIENT)
Dept: DERMATOLOGY | Facility: CLINIC | Age: 75
End: 2020-02-19
Payer: MEDICARE

## 2020-02-19 PROCEDURE — 17000 DESTRUCT PREMALG LESION: CPT

## 2020-02-19 PROCEDURE — 99214 OFFICE O/P EST MOD 30 MIN: CPT | Mod: 25

## 2020-02-20 ENCOUNTER — APPOINTMENT (OUTPATIENT)
Dept: GASTROENTEROLOGY | Facility: CLINIC | Age: 75
End: 2020-02-20
Payer: MEDICARE

## 2020-02-20 VITALS
WEIGHT: 96 LBS | HEART RATE: 76 BPM | BODY MASS INDEX: 17.01 KG/M2 | SYSTOLIC BLOOD PRESSURE: 118 MMHG | RESPIRATION RATE: 16 BRPM | OXYGEN SATURATION: 99 % | HEIGHT: 63 IN | DIASTOLIC BLOOD PRESSURE: 78 MMHG

## 2020-02-20 PROCEDURE — 99214 OFFICE O/P EST MOD 30 MIN: CPT | Mod: 25

## 2020-02-20 PROCEDURE — 17000 DESTRUCT PREMALG LESION: CPT

## 2020-02-20 NOTE — PHYSICAL EXAM
[General Appearance - Alert] : alert [General Appearance - In No Acute Distress] : in no acute distress [General Appearance - Well Nourished] : well nourished [General Appearance - Well Developed] : well developed [General Appearance - Well-Appearing] : healthy appearing [PERRL With Normal Accommodation] : pupils were equal in size, round, and reactive to light [Extraocular Movements] : extraocular movements were intact [Sclera] : the sclera and conjunctiva were normal [Outer Ear] : the ears and nose were normal in appearance [Hearing Threshold Finger Rub Not [place]] : hearing was normal [Examination Of The Oral Cavity] : the lips and gums were normal [Oropharynx] : the oropharynx was normal [Neck Appearance] : the appearance of the neck was normal [Heart Rate And Rhythm] : heart rate was normal and rhythm regular [Bowel Sounds] : normal bowel sounds [Abdomen Tenderness] : non-tender [Abdomen Soft] : soft [Abnormal Walk] : normal gait [Abdomen Mass (___ Cm)] : no abdominal mass palpated [Nail Clubbing] : no clubbing  or cyanosis of the fingernails [Motor Tone] : muscle strength and tone were normal [Musculoskeletal - Swelling] : no joint swelling seen [Skin Color & Pigmentation] : normal skin color and pigmentation [Skin Turgor] : normal skin turgor [Motor Exam] : the motor exam was normal [Skin Lesions] : no skin lesions [] : no rash [No Focal Deficits] : no focal deficits [Oriented To Time, Place, And Person] : oriented to person, place, and time [Impaired Insight] : insight and judgment were intact [Affect] : the affect was normal

## 2020-02-20 NOTE — ASSESSMENT
[FreeTextEntry1] : At this time she is doing well in terms of her chronic gastroesophageal reflux without esophagitis as well as dyspepsia and irritable bowel syndrome with constipation. She'll continue with the same medical regimen. She'll be seen again in 6 months at which time I will arrange for an MRI of the pancreas for followup of a very small pancreatic cyst. She does not require another colonoscopy until 2024.

## 2020-02-20 NOTE — HISTORY OF PRESENT ILLNESS
[de-identified] : In July 2019 she underwent a CAT scan of the abdomen and pelvis with oral and IV contrast. They were gallstones present. 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 There was mild thickening of the left adrenal gland. There was streak artifact due to the patient's hip prostheses. The bowel appeared normal throughout. There was some atherosclerosis. 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 MRI of the pancreas was recommended in one year to assess the pancreatic cyst possibility. She stopped taking famotidine due to leg cramping and replaced it with ranitidine 150 mg p.o. b.i.d. In February of 2019 she underwent  an upper endoscopy was essentially normal and a colonoscopy in May 2019  was normal as well. At this time she is feeling well with no diarrhea or constipation on a low FODMAP diet and no heartburn. dysphagia, nausea or vomiting. Although she is very thin her appetite and weight are stable. There is no abdominal pain whatsoever.

## 2020-03-17 ENCOUNTER — APPOINTMENT (OUTPATIENT)
Dept: RHEUMATOLOGY | Facility: CLINIC | Age: 75
End: 2020-03-17

## 2020-04-22 ENCOUNTER — APPOINTMENT (OUTPATIENT)
Dept: RHEUMATOLOGY | Facility: CLINIC | Age: 75
End: 2020-04-22
Payer: MEDICARE

## 2020-04-22 PROCEDURE — 99214 OFFICE O/P EST MOD 30 MIN: CPT | Mod: 95

## 2020-04-23 RX ORDER — FAMOTIDINE 20 MG/1
20 TABLET, FILM COATED ORAL
Qty: 60 | Refills: 5 | Status: DISCONTINUED | COMMUNITY
Start: 2019-07-05 | End: 2020-04-23

## 2020-04-23 RX ORDER — DULOXETINE HYDROCHLORIDE 20 MG/1
20 CAPSULE, DELAYED RELEASE PELLETS ORAL
Qty: 90 | Refills: 0 | Status: DISCONTINUED | COMMUNITY
Start: 2019-09-27 | End: 2020-04-23

## 2020-04-25 ENCOUNTER — RX RENEWAL (OUTPATIENT)
Age: 75
End: 2020-04-25

## 2020-06-25 ENCOUNTER — NON-APPOINTMENT (OUTPATIENT)
Age: 75
End: 2020-06-25

## 2020-07-09 ENCOUNTER — APPOINTMENT (OUTPATIENT)
Dept: RHEUMATOLOGY | Facility: CLINIC | Age: 75
End: 2020-07-09
Payer: MEDICARE

## 2020-07-09 VITALS
BODY MASS INDEX: 17.72 KG/M2 | DIASTOLIC BLOOD PRESSURE: 70 MMHG | RESPIRATION RATE: 16 BRPM | OXYGEN SATURATION: 95 % | HEART RATE: 88 BPM | HEIGHT: 63 IN | WEIGHT: 100 LBS | TEMPERATURE: 98 F | SYSTOLIC BLOOD PRESSURE: 128 MMHG

## 2020-07-09 PROCEDURE — 99215 OFFICE O/P EST HI 40 MIN: CPT | Mod: 25

## 2020-07-09 PROCEDURE — 36415 COLL VENOUS BLD VENIPUNCTURE: CPT

## 2020-07-09 RX ORDER — RANITIDINE 150 MG/1
150 TABLET ORAL TWICE DAILY
Qty: 180 | Refills: 1 | Status: DISCONTINUED | COMMUNITY
Start: 2019-07-22 | End: 2020-07-09

## 2020-07-09 RX ORDER — RANITIDINE 150 MG/1
150 TABLET ORAL TWICE DAILY
Qty: 180 | Refills: 3 | Status: DISCONTINUED | COMMUNITY
Start: 2020-02-20 | End: 2020-07-09

## 2020-07-09 RX ORDER — ACETAMINOPHEN 500 MG/1
500 TABLET, COATED ORAL
Qty: 100 | Refills: 0 | Status: DISCONTINUED | COMMUNITY
Start: 2020-04-23 | End: 2020-07-09

## 2020-07-09 RX ORDER — POLYETHYLENE GLYOCOL 3350, SODIUM CHLORIDE, SODIUM BICARBONATE AND POTASSIUM CHLORIDE 420; 11.2; 5.72; 1.48 G/4L; G/4L; G/4L; G/4L
420 POWDER, FOR SOLUTION NASOGASTRIC; ORAL
Qty: 1 | Refills: 0 | Status: DISCONTINUED | COMMUNITY
Start: 2019-03-27 | End: 2020-07-09

## 2020-07-15 LAB
25(OH)D3 SERPL-MCNC: 62.1 NG/ML
ALBUMIN SERPL ELPH-MCNC: 5.4 G/DL
ALP BLD-CCNC: 80 U/L
ALT SERPL-CCNC: 20 U/L
ANION GAP SERPL CALC-SCNC: 17 MMOL/L
APPEARANCE: CLEAR
AST SERPL-CCNC: 34 U/L
BACTERIA: NEGATIVE
BASOPHILS # BLD AUTO: 0.04 K/UL
BASOPHILS NFR BLD AUTO: 0.9 %
BILIRUB SERPL-MCNC: 0.4 MG/DL
BILIRUBIN URINE: NEGATIVE
BLOOD URINE: NEGATIVE
BUN SERPL-MCNC: 15 MG/DL
CALCIUM SERPL-MCNC: 10.2 MG/DL
CALCIUM SERPL-MCNC: 10.2 MG/DL
CHLORIDE SERPL-SCNC: 102 MMOL/L
CK SERPL-CCNC: 250 U/L
CO2 SERPL-SCNC: 25 MMOL/L
COLOR: COLORLESS
CREAT SERPL-MCNC: 0.41 MG/DL
CRP SERPL-MCNC: <0.1 MG/DL
DEPRECATED KAPPA LC FREE/LAMBDA SER: 1.15 RATIO
ENDOMYSIUM IGA SER QL: NEGATIVE
ENDOMYSIUM IGA TITR SER: NORMAL
EOSINOPHIL # BLD AUTO: 0.34 K/UL
EOSINOPHIL NFR BLD AUTO: 7.3 %
ERYTHROCYTE [SEDIMENTATION RATE] IN BLOOD BY WESTERGREN METHOD: 17 MM/HR
GLIADIN IGA SER QL: <5 UNITS
GLIADIN IGG SER QL: <5 UNITS
GLIADIN PEPTIDE IGA SER-ACNC: NEGATIVE
GLIADIN PEPTIDE IGG SER-ACNC: NEGATIVE
GLUCOSE QUALITATIVE U: NEGATIVE
GLUCOSE SERPL-MCNC: 110 MG/DL
HCT VFR BLD CALC: 45.6 %
HGB BLD-MCNC: 14.2 G/DL
HYALINE CASTS: 0 /LPF
IGA SER QL IEP: 102 MG/DL
IGG SER QL IEP: 725 MG/DL
IGM SER QL IEP: 50 MG/DL
IMM GRANULOCYTES NFR BLD AUTO: 0.2 %
KAPPA LC CSF-MCNC: 0.59 MG/DL
KAPPA LC SERPL-MCNC: 0.68 MG/DL
KETONES URINE: NEGATIVE
LDH SERPL-CCNC: 245 U/L
LEUKOCYTE ESTERASE URINE: NEGATIVE
LYMPHOCYTES # BLD AUTO: 1.23 K/UL
LYMPHOCYTES NFR BLD AUTO: 26.5 %
M PROTEIN SPEC IFE-MCNC: NORMAL
MAGNESIUM SERPL-MCNC: 2.1 MG/DL
MAN DIFF?: NORMAL
MCHC RBC-ENTMCNC: 29.3 PG
MCHC RBC-ENTMCNC: 31.1 GM/DL
MCV RBC AUTO: 94.2 FL
MICROSCOPIC-UA: NORMAL
MONOCYTES # BLD AUTO: 0.58 K/UL
MONOCYTES NFR BLD AUTO: 12.5 %
NEUTROPHILS # BLD AUTO: 2.45 K/UL
NEUTROPHILS NFR BLD AUTO: 52.6 %
NITRITE URINE: NEGATIVE
PARATHYROID HORMONE INTACT: 36 PG/ML
PH URINE: 7
PHOSPHATE SERPL-MCNC: 3.4 MG/DL
PLATELET # BLD AUTO: 175 K/UL
POTASSIUM SERPL-SCNC: 3.7 MMOL/L
PROT SERPL-MCNC: 7.5 G/DL
PROTEIN URINE: NEGATIVE
RBC # BLD: 4.84 M/UL
RBC # FLD: 14.2 %
RED BLOOD CELLS URINE: 0 /HPF
SODIUM SERPL-SCNC: 145 MMOL/L
SPECIFIC GRAVITY URINE: 1
SQUAMOUS EPITHELIAL CELLS: 0 /HPF
TSH SERPL-ACNC: 1.58 UIU/ML
TTG IGA SER IA-ACNC: <1.2 U/ML
TTG IGA SER-ACNC: NEGATIVE
TTG IGG SER IA-ACNC: 3.8 U/ML
TTG IGG SER IA-ACNC: NEGATIVE
UROBILINOGEN URINE: NORMAL
WBC # FLD AUTO: 4.65 K/UL
WHITE BLOOD CELLS URINE: 0 /HPF

## 2020-07-28 ENCOUNTER — APPOINTMENT (OUTPATIENT)
Dept: RHEUMATOLOGY | Facility: CLINIC | Age: 75
End: 2020-07-28
Payer: MEDICARE

## 2020-07-28 VITALS
RESPIRATION RATE: 17 BRPM | TEMPERATURE: 99 F | DIASTOLIC BLOOD PRESSURE: 69 MMHG | HEART RATE: 89 BPM | SYSTOLIC BLOOD PRESSURE: 109 MMHG | OXYGEN SATURATION: 97 %

## 2020-07-28 PROCEDURE — 96372 THER/PROPH/DIAG INJ SC/IM: CPT

## 2020-07-28 RX ORDER — DENOSUMAB 60 MG/ML
60 INJECTION SUBCUTANEOUS
Qty: 1 | Refills: 0 | Status: COMPLETED | OUTPATIENT
Start: 2020-07-21

## 2020-10-12 ENCOUNTER — RX RENEWAL (OUTPATIENT)
Age: 75
End: 2020-10-12

## 2020-10-14 ENCOUNTER — APPOINTMENT (OUTPATIENT)
Dept: RHEUMATOLOGY | Facility: CLINIC | Age: 75
End: 2020-10-14

## 2020-10-14 ENCOUNTER — APPOINTMENT (OUTPATIENT)
Dept: RHEUMATOLOGY | Facility: CLINIC | Age: 75
End: 2020-10-14
Payer: MEDICARE

## 2020-10-14 DIAGNOSIS — M25.60 STIFFNESS OF UNSPECIFIED JOINT, NOT ELSEWHERE CLASSIFIED: ICD-10-CM

## 2020-10-14 PROCEDURE — 99214 OFFICE O/P EST MOD 30 MIN: CPT | Mod: 95

## 2020-10-21 ENCOUNTER — RX RENEWAL (OUTPATIENT)
Age: 75
End: 2020-10-21

## 2020-10-25 PROBLEM — M25.60 JOINT STIFFNESS: Status: ACTIVE | Noted: 2020-10-25

## 2020-10-25 RX ORDER — DICLOFENAC SODIUM 10 MG/G
1 GEL TOPICAL
Qty: 10 | Refills: 2 | Status: ACTIVE | COMMUNITY
Start: 2020-10-25 | End: 1900-01-01

## 2020-11-13 LAB
ALBUMIN SERPL ELPH-MCNC: 5 G/DL
ALP BLD-CCNC: 71 U/L
ALT SERPL-CCNC: 22 U/L
ANION GAP SERPL CALC-SCNC: 13 MMOL/L
APPEARANCE: CLEAR
AST SERPL-CCNC: 30 U/L
BACTERIA: NEGATIVE
BASOPHILS # BLD AUTO: 0.06 K/UL
BASOPHILS NFR BLD AUTO: 1.4 %
BILIRUB SERPL-MCNC: 0.6 MG/DL
BILIRUBIN URINE: NEGATIVE
BLOOD URINE: NEGATIVE
BUN SERPL-MCNC: 17 MG/DL
CALCIUM SERPL-MCNC: 10.2 MG/DL
CHLORIDE SERPL-SCNC: 102 MMOL/L
CO2 SERPL-SCNC: 28 MMOL/L
COLOR: NORMAL
CREAT SERPL-MCNC: 0.38 MG/DL
CRP SERPL-MCNC: <0.1 MG/DL
EOSINOPHIL # BLD AUTO: 0.33 K/UL
EOSINOPHIL NFR BLD AUTO: 7.6 %
ERYTHROCYTE [SEDIMENTATION RATE] IN BLOOD BY WESTERGREN METHOD: 3 MM/HR
GLUCOSE QUALITATIVE U: NEGATIVE
GLUCOSE SERPL-MCNC: 126 MG/DL
HCT VFR BLD CALC: 46.7 %
HGB BLD-MCNC: 14.6 G/DL
HYALINE CASTS: 0 /LPF
IMM GRANULOCYTES NFR BLD AUTO: 0.2 %
KETONES URINE: NEGATIVE
LEUKOCYTE ESTERASE URINE: NEGATIVE
LYMPHOCYTES # BLD AUTO: 1.16 K/UL
LYMPHOCYTES NFR BLD AUTO: 26.5 %
MAN DIFF?: NORMAL
MCHC RBC-ENTMCNC: 29.6 PG
MCHC RBC-ENTMCNC: 31.3 GM/DL
MCV RBC AUTO: 94.5 FL
MICROSCOPIC-UA: NORMAL
MONOCYTES # BLD AUTO: 0.53 K/UL
MONOCYTES NFR BLD AUTO: 12.1 %
NEUTROPHILS # BLD AUTO: 2.28 K/UL
NEUTROPHILS NFR BLD AUTO: 52.2 %
NITRITE URINE: NEGATIVE
PH URINE: 7
PHOSPHATE SERPL-MCNC: 3.3 MG/DL
PLATELET # BLD AUTO: 194 K/UL
POTASSIUM SERPL-SCNC: 4.3 MMOL/L
PROT SERPL-MCNC: 7.1 G/DL
PROTEIN URINE: NEGATIVE
RBC # BLD: 4.94 M/UL
RBC # FLD: 14 %
RED BLOOD CELLS URINE: 1 /HPF
SODIUM SERPL-SCNC: 142 MMOL/L
SPECIFIC GRAVITY URINE: 1.01
SQUAMOUS EPITHELIAL CELLS: 0 /HPF
UROBILINOGEN URINE: NORMAL
WBC # FLD AUTO: 4.37 K/UL
WHITE BLOOD CELLS URINE: 0 /HPF

## 2020-12-11 ENCOUNTER — APPOINTMENT (OUTPATIENT)
Dept: GASTROENTEROLOGY | Facility: CLINIC | Age: 75
End: 2020-12-11
Payer: MEDICARE

## 2020-12-11 VITALS
SYSTOLIC BLOOD PRESSURE: 127 MMHG | BODY MASS INDEX: 16.48 KG/M2 | DIASTOLIC BLOOD PRESSURE: 82 MMHG | HEIGHT: 63 IN | WEIGHT: 93 LBS | RESPIRATION RATE: 14 BRPM | HEART RATE: 100 BPM | TEMPERATURE: 98 F | OXYGEN SATURATION: 99 %

## 2020-12-11 PROCEDURE — 99214 OFFICE O/P EST MOD 30 MIN: CPT

## 2020-12-11 NOTE — PHYSICAL EXAM
[General Appearance - Alert] : alert [General Appearance - In No Acute Distress] : in no acute distress [General Appearance - Well Nourished] : well nourished [General Appearance - Well Developed] : well developed [General Appearance - Well-Appearing] : healthy appearing [Sclera] : the sclera and conjunctiva were normal [PERRL With Normal Accommodation] : pupils were equal in size, round, and reactive to light [Extraocular Movements] : extraocular movements were intact [Outer Ear] : the ears and nose were normal in appearance [Hearing Threshold Finger Rub Not [place]] : hearing was normal [Examination Of The Oral Cavity] : the lips and gums were normal [Oropharynx] : the oropharynx was normal [Neck Appearance] : the appearance of the neck was normal [Heart Rate And Rhythm] : heart rate was normal and rhythm regular [Bowel Sounds] : normal bowel sounds [Abdomen Soft] : soft [Abdomen Tenderness] : non-tender [Abdomen Mass (___ Cm)] : no abdominal mass palpated [Abnormal Walk] : normal gait [Nail Clubbing] : no clubbing  or cyanosis of the fingernails [Musculoskeletal - Swelling] : no joint swelling seen [Motor Tone] : muscle strength and tone were normal [Skin Color & Pigmentation] : normal skin color and pigmentation [Skin Turgor] : normal skin turgor [] : no rash [Skin Lesions] : no skin lesions [Motor Exam] : the motor exam was normal [No Focal Deficits] : no focal deficits [Oriented To Time, Place, And Person] : oriented to person, place, and time [Impaired Insight] : insight and judgment were intact [Affect] : the affect was normal

## 2020-12-11 NOTE — ASSESSMENT
[FreeTextEntry1] : At this time her underlying gastroesophageal reflux without esophagitis is well controlled on famotidine 20 mg p.o. b.i.d. which she will continue. 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 It should be noted that this year celiac antibodies were negative. Her liver function tests are normal. She will undergo followup MRI of the pancreas in September of next year and will be seen again one month prior for further followup.

## 2020-12-11 NOTE — HISTORY OF PRESENT ILLNESS
[de-identified] : In July 2019 she underwent a CAT scan of the abdomen and pelvis with oral and IV contrast. They were gallstones present. 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 There was mild thickening of the left adrenal gland. There was streak artifact due to the patient's hip prostheses. The bowel appeared normal throughout. There was some atherosclerosis. 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 MRI of the pancreas was recommended in one year to assess the pancreatic cyst possibility. She stopped taking famotidine due to leg cramping and replaced it with ranitidine 150 mg p.o. b.i.d. and now take famotidine 20mg PO BID.  In February of 2019 she underwent  an upper endoscopy was essentially normal and a colonoscopy in May 2019  was normal as well. At this time she is feeling well with no diarrhea or constipation on a low FODMAP diet and no heartburn. dysphagia, nausea or vomiting. Although she is very thin her appetite and weight are stable. About 2 months ago she experienced one and a half hours of periumbilical sharp pain that resolved and has not recurred. There were no other associated symptoms. At this time she is generally feeling quite well. She underwent an MRI of the abdomen for followup of her pancreatic cyst that revealed a 6 x 9 mm simple cyst in the tail of the pancreas. There were no other significant abnormalities.

## 2021-01-11 ENCOUNTER — RX RENEWAL (OUTPATIENT)
Age: 76
End: 2021-01-11

## 2021-01-16 ENCOUNTER — RX RENEWAL (OUTPATIENT)
Age: 76
End: 2021-01-16

## 2021-01-25 ENCOUNTER — APPOINTMENT (OUTPATIENT)
Dept: RHEUMATOLOGY | Facility: CLINIC | Age: 76
End: 2021-01-25
Payer: MEDICARE

## 2021-01-25 DIAGNOSIS — M79.644 PAIN IN RIGHT FINGER(S): ICD-10-CM

## 2021-01-25 PROCEDURE — 99215 OFFICE O/P EST HI 40 MIN: CPT | Mod: 95

## 2021-01-28 PROBLEM — M79.644 THUMB PAIN, RIGHT: Status: ACTIVE | Noted: 2021-01-28

## 2021-03-09 LAB
ALBUMIN SERPL ELPH-MCNC: 4.7 G/DL
ALP BLD-CCNC: 81 U/L
ALT SERPL-CCNC: 18 U/L
ANION GAP SERPL CALC-SCNC: 12 MMOL/L
APPEARANCE: CLEAR
AST SERPL-CCNC: 28 U/L
BACTERIA: NEGATIVE
BASOPHILS # BLD AUTO: 0.06 K/UL
BASOPHILS NFR BLD AUTO: 1.3 %
BILIRUB SERPL-MCNC: 0.3 MG/DL
BILIRUBIN URINE: NEGATIVE
BLOOD URINE: NEGATIVE
BUN SERPL-MCNC: 14 MG/DL
CALCIUM SERPL-MCNC: 10.2 MG/DL
CHLORIDE SERPL-SCNC: 103 MMOL/L
CK SERPL-CCNC: 181 U/L
CO2 SERPL-SCNC: 28 MMOL/L
COLOR: NORMAL
CREAT SERPL-MCNC: 0.76 MG/DL
EOSINOPHIL # BLD AUTO: 0.35 K/UL
EOSINOPHIL NFR BLD AUTO: 7.3 %
ERYTHROCYTE [SEDIMENTATION RATE] IN BLOOD BY WESTERGREN METHOD: 2 MM/HR
GLUCOSE QUALITATIVE U: NEGATIVE
GLUCOSE SERPL-MCNC: 183 MG/DL
HCT VFR BLD CALC: 41.8 %
HGB BLD-MCNC: 13.6 G/DL
HYALINE CASTS: 0 /LPF
IMM GRANULOCYTES NFR BLD AUTO: 0.2 %
KETONES URINE: NEGATIVE
LEUKOCYTE ESTERASE URINE: NEGATIVE
LYMPHOCYTES # BLD AUTO: 1.42 K/UL
LYMPHOCYTES NFR BLD AUTO: 29.8 %
MAN DIFF?: NORMAL
MCHC RBC-ENTMCNC: 30.1 PG
MCHC RBC-ENTMCNC: 32.5 GM/DL
MCV RBC AUTO: 92.5 FL
MICROSCOPIC-UA: NORMAL
MONOCYTES # BLD AUTO: 0.47 K/UL
MONOCYTES NFR BLD AUTO: 9.9 %
NEUTROPHILS # BLD AUTO: 2.46 K/UL
NEUTROPHILS NFR BLD AUTO: 51.5 %
NITRITE URINE: NEGATIVE
PH URINE: 6.5
PHOSPHATE SERPL-MCNC: 3.6 MG/DL
PLATELET # BLD AUTO: 189 K/UL
POTASSIUM SERPL-SCNC: 4.1 MMOL/L
PROT SERPL-MCNC: 6.8 G/DL
PROTEIN URINE: NEGATIVE
RBC # BLD: 4.52 M/UL
RBC # FLD: 13.6 %
RED BLOOD CELLS URINE: 3 /HPF
SODIUM SERPL-SCNC: 142 MMOL/L
SPECIFIC GRAVITY URINE: 1.01
SQUAMOUS EPITHELIAL CELLS: 0 /HPF
UROBILINOGEN URINE: NORMAL
WBC # FLD AUTO: 4.77 K/UL
WHITE BLOOD CELLS URINE: 0 /HPF

## 2021-03-20 LAB — CRP SERPL-MCNC: <3 MG/L

## 2021-03-31 ENCOUNTER — APPOINTMENT (OUTPATIENT)
Dept: RHEUMATOLOGY | Facility: CLINIC | Age: 76
End: 2021-03-31
Payer: MEDICARE

## 2021-03-31 VITALS
TEMPERATURE: 98.4 F | DIASTOLIC BLOOD PRESSURE: 77 MMHG | SYSTOLIC BLOOD PRESSURE: 134 MMHG | OXYGEN SATURATION: 97 % | HEART RATE: 89 BPM | RESPIRATION RATE: 16 BRPM

## 2021-03-31 PROCEDURE — 96372 THER/PROPH/DIAG INJ SC/IM: CPT

## 2021-03-31 RX ORDER — DENOSUMAB 60 MG/ML
60 INJECTION SUBCUTANEOUS
Qty: 1 | Refills: 0 | Status: COMPLETED | OUTPATIENT
Start: 2021-03-23

## 2021-04-14 ENCOUNTER — RX RENEWAL (OUTPATIENT)
Age: 76
End: 2021-04-14

## 2021-04-19 ENCOUNTER — APPOINTMENT (OUTPATIENT)
Dept: RHEUMATOLOGY | Facility: CLINIC | Age: 76
End: 2021-04-19
Payer: MEDICARE

## 2021-04-19 VITALS
HEIGHT: 63 IN | SYSTOLIC BLOOD PRESSURE: 126 MMHG | OXYGEN SATURATION: 97 % | BODY MASS INDEX: 16.83 KG/M2 | DIASTOLIC BLOOD PRESSURE: 80 MMHG | TEMPERATURE: 97.9 F | HEART RATE: 90 BPM | RESPIRATION RATE: 17 BRPM | WEIGHT: 95 LBS

## 2021-04-19 DIAGNOSIS — M77.12 LATERAL EPICONDYLITIS, LEFT ELBOW: ICD-10-CM

## 2021-04-19 PROCEDURE — 99215 OFFICE O/P EST HI 40 MIN: CPT

## 2021-04-20 ENCOUNTER — RX RENEWAL (OUTPATIENT)
Age: 76
End: 2021-04-20

## 2021-04-22 RX ORDER — MAGNESIUM OXIDE/MAG AA CHELATE 300 MG
CAPSULE ORAL
Refills: 0 | Status: ACTIVE | COMMUNITY

## 2021-04-28 ENCOUNTER — RX RENEWAL (OUTPATIENT)
Age: 76
End: 2021-04-28

## 2021-05-03 ENCOUNTER — OUTPATIENT (OUTPATIENT)
Dept: OUTPATIENT SERVICES | Facility: HOSPITAL | Age: 76
LOS: 1 days | End: 2021-05-03
Payer: MEDICARE

## 2021-05-03 ENCOUNTER — APPOINTMENT (OUTPATIENT)
Dept: RADIOLOGY | Facility: CLINIC | Age: 76
End: 2021-05-03
Payer: MEDICARE

## 2021-05-03 DIAGNOSIS — M81.0 AGE-RELATED OSTEOPOROSIS WITHOUT CURRENT PATHOLOGICAL FRACTURE: ICD-10-CM

## 2021-05-03 DIAGNOSIS — M21.951 UNSPECIFIED ACQUIRED DEFORMITY OF RIGHT THIGH: ICD-10-CM

## 2021-05-03 DIAGNOSIS — M15.9 POLYOSTEOARTHRITIS, UNSPECIFIED: ICD-10-CM

## 2021-05-03 DIAGNOSIS — M54.5 LOW BACK PAIN: ICD-10-CM

## 2021-05-03 DIAGNOSIS — Z96.649 PRESENCE OF UNSPECIFIED ARTIFICIAL HIP JOINT: Chronic | ICD-10-CM

## 2021-05-03 DIAGNOSIS — M65.9 SYNOVITIS AND TENOSYNOVITIS, UNSPECIFIED: ICD-10-CM

## 2021-05-03 DIAGNOSIS — M41.9 SCOLIOSIS, UNSPECIFIED: ICD-10-CM

## 2021-05-03 DIAGNOSIS — M79.641 PAIN IN RIGHT HAND: ICD-10-CM

## 2021-05-03 DIAGNOSIS — M77.12 LATERAL EPICONDYLITIS, LEFT ELBOW: ICD-10-CM

## 2021-05-03 PROCEDURE — 73100 X-RAY EXAM OF WRIST: CPT

## 2021-05-03 PROCEDURE — 73080 X-RAY EXAM OF ELBOW: CPT

## 2021-05-03 PROCEDURE — 73120 X-RAY EXAM OF HAND: CPT | Mod: 26,50

## 2021-05-03 PROCEDURE — 73120 X-RAY EXAM OF HAND: CPT

## 2021-05-03 PROCEDURE — 73100 X-RAY EXAM OF WRIST: CPT | Mod: 26,50

## 2021-05-03 PROCEDURE — 73080 X-RAY EXAM OF ELBOW: CPT | Mod: 26,50

## 2021-07-08 ENCOUNTER — RX RENEWAL (OUTPATIENT)
Age: 76
End: 2021-07-08

## 2021-07-22 ENCOUNTER — RX RENEWAL (OUTPATIENT)
Age: 76
End: 2021-07-22

## 2021-08-03 ENCOUNTER — RX RENEWAL (OUTPATIENT)
Age: 76
End: 2021-08-03

## 2021-08-10 ENCOUNTER — APPOINTMENT (OUTPATIENT)
Dept: RHEUMATOLOGY | Facility: CLINIC | Age: 76
End: 2021-08-10
Payer: MEDICARE

## 2021-08-10 VITALS
HEART RATE: 89 BPM | WEIGHT: 90 LBS | BODY MASS INDEX: 15.95 KG/M2 | TEMPERATURE: 98 F | RESPIRATION RATE: 17 BRPM | HEIGHT: 63 IN | OXYGEN SATURATION: 95 % | SYSTOLIC BLOOD PRESSURE: 120 MMHG | DIASTOLIC BLOOD PRESSURE: 62 MMHG

## 2021-08-10 PROCEDURE — 36415 COLL VENOUS BLD VENIPUNCTURE: CPT

## 2021-08-10 PROCEDURE — 81003 URINALYSIS AUTO W/O SCOPE: CPT | Mod: QW

## 2021-08-10 PROCEDURE — 99214 OFFICE O/P EST MOD 30 MIN: CPT | Mod: 25

## 2021-08-16 ENCOUNTER — RX CHANGE (OUTPATIENT)
Age: 76
End: 2021-08-16

## 2021-08-16 LAB
ALBUMIN SERPL ELPH-MCNC: 4.7 G/DL
ALP BLD-CCNC: 69 U/L
ALT SERPL-CCNC: 18 U/L
ANION GAP SERPL CALC-SCNC: 12 MMOL/L
AST SERPL-CCNC: 24 U/L
BASOPHILS # BLD AUTO: 0.06 K/UL
BASOPHILS NFR BLD AUTO: 1 %
BILIRUB SERPL-MCNC: 0.3 MG/DL
BILIRUB UR QL STRIP: NEGATIVE
BUN SERPL-MCNC: 24 MG/DL
CALCIUM SERPL-MCNC: 10.3 MG/DL
CHLORIDE SERPL-SCNC: 101 MMOL/L
CK SERPL-CCNC: 106 U/L
CLARITY UR: CLEAR
CO2 SERPL-SCNC: 27 MMOL/L
COLLECTION METHOD: NORMAL
CREAT SERPL-MCNC: 0.47 MG/DL
CRP SERPL-MCNC: <3 MG/L
EOSINOPHIL # BLD AUTO: 0.58 K/UL
EOSINOPHIL NFR BLD AUTO: 10.1 %
ERYTHROCYTE [SEDIMENTATION RATE] IN BLOOD BY WESTERGREN METHOD: 2 MM/HR
GLUCOSE SERPL-MCNC: 109 MG/DL
GLUCOSE UR-MCNC: NEGATIVE
HCG UR QL: 0.2 EU/DL
HCT VFR BLD CALC: 42.8 %
HGB BLD-MCNC: 13.8 G/DL
HGB UR QL STRIP.AUTO: NORMAL
IMM GRANULOCYTES NFR BLD AUTO: 0.2 %
KETONES UR-MCNC: NEGATIVE
LDH SERPL-CCNC: 208 U/L
LEUKOCYTE ESTERASE UR QL STRIP: NEGATIVE
LYMPHOCYTES # BLD AUTO: 1.2 K/UL
LYMPHOCYTES NFR BLD AUTO: 20.9 %
MAN DIFF?: NORMAL
MCHC RBC-ENTMCNC: 30.7 PG
MCHC RBC-ENTMCNC: 32.2 GM/DL
MCV RBC AUTO: 95.3 FL
MONOCYTES # BLD AUTO: 0.68 K/UL
MONOCYTES NFR BLD AUTO: 11.8 %
NEUTROPHILS # BLD AUTO: 3.21 K/UL
NEUTROPHILS NFR BLD AUTO: 56 %
NITRITE UR QL STRIP: NEGATIVE
PH UR STRIP: 6
PHOSPHATE SERPL-MCNC: 3.5 MG/DL
PLATELET # BLD AUTO: 193 K/UL
POTASSIUM SERPL-SCNC: 4.1 MMOL/L
PROT SERPL-MCNC: 6.8 G/DL
PROT UR STRIP-MCNC: NEGATIVE
RBC # BLD: 4.49 M/UL
RBC # FLD: 14.3 %
SODIUM SERPL-SCNC: 140 MMOL/L
SP GR UR STRIP: 1.02
WBC # FLD AUTO: 5.74 K/UL

## 2021-08-17 ENCOUNTER — RX CHANGE (OUTPATIENT)
Age: 76
End: 2021-08-17

## 2021-08-17 RX ORDER — DULOXETINE HYDROCHLORIDE 20 MG/1
20 CAPSULE, DELAYED RELEASE PELLETS ORAL
Qty: 30 | Refills: 0 | Status: DISCONTINUED | COMMUNITY
Start: 2021-01-28 | End: 2021-08-17

## 2021-08-24 ENCOUNTER — NON-APPOINTMENT (OUTPATIENT)
Age: 76
End: 2021-08-24

## 2021-09-13 ENCOUNTER — NON-APPOINTMENT (OUTPATIENT)
Age: 76
End: 2021-09-13

## 2021-09-17 ENCOUNTER — APPOINTMENT (OUTPATIENT)
Dept: GASTROENTEROLOGY | Facility: CLINIC | Age: 76
End: 2021-09-17
Payer: MEDICARE

## 2021-09-17 VITALS
BODY MASS INDEX: 15.77 KG/M2 | TEMPERATURE: 97.3 F | RESPIRATION RATE: 17 BRPM | HEART RATE: 90 BPM | OXYGEN SATURATION: 96 % | HEIGHT: 63 IN | DIASTOLIC BLOOD PRESSURE: 80 MMHG | SYSTOLIC BLOOD PRESSURE: 120 MMHG | WEIGHT: 89 LBS

## 2021-09-17 DIAGNOSIS — R13.10 DYSPHAGIA, UNSPECIFIED: ICD-10-CM

## 2021-09-17 DIAGNOSIS — Z78.9 OTHER SPECIFIED HEALTH STATUS: ICD-10-CM

## 2021-09-17 DIAGNOSIS — K58.1 IRRITABLE BOWEL SYNDROME WITH CONSTIPATION: ICD-10-CM

## 2021-09-17 PROCEDURE — 99214 OFFICE O/P EST MOD 30 MIN: CPT

## 2021-09-17 NOTE — ASSESSMENT
[FreeTextEntry1] : At this time she is doing well and we will simply continue with the same medical regimen.  She will undergo a follow-up CAT scan of the abdomen and will call me 1 week later for the results.  If there are no significant issues she will simply follow-up with me in 1 year.

## 2021-09-17 NOTE — HISTORY OF PRESENT ILLNESS
[de-identified] : She continues to take famotidine 20 mg p.o. twice daily for her underlying gastroesophageal reflux without esophagitis and denies any heartburn or dysphagia.  She denies any abdominal pain.  Her irritable bowel syndrome is now in a spontaneous remission as she is experiencing normal daily bowel movement without any supplements or medication.  She is due for follow-up imaging of her pancreas due to the presence of a small pancreatic cyst.  She has a strong family history of colon cancer last underwent a colonoscopy in May 2019 which was normal.  She will therefore be due for follow-up colonoscopy in May 2024.

## 2021-09-17 NOTE — PHYSICAL EXAM
[General Appearance - Alert] : alert [General Appearance - In No Acute Distress] : in no acute distress [General Appearance - Well Nourished] : well nourished [General Appearance - Well Developed] : well developed [General Appearance - Well-Appearing] : healthy appearing [Sclera] : the sclera and conjunctiva were normal [PERRL With Normal Accommodation] : pupils were equal in size, round, and reactive to light [Extraocular Movements] : extraocular movements were intact [Outer Ear] : the ears and nose were normal in appearance [Hearing Threshold Finger Rub Not [place]] : hearing was normal [Examination Of The Oral Cavity] : the lips and gums were normal [Oropharynx] : the oropharynx was normal [Neck Appearance] : the appearance of the neck was normal [Heart Rate And Rhythm] : heart rate was normal and rhythm regular [Bowel Sounds] : normal bowel sounds [Abdomen Soft] : soft [Abdomen Tenderness] : non-tender [Abdomen Mass (___ Cm)] : no abdominal mass palpated [Abnormal Walk] : normal gait [Nail Clubbing] : no clubbing  or cyanosis of the fingernails [Musculoskeletal - Swelling] : no joint swelling seen [Motor Tone] : muscle strength and tone were normal [Skin Color & Pigmentation] : normal skin color and pigmentation [Skin Turgor] : normal skin turgor [] : no rash [Skin Lesions] : no skin lesions [Motor Exam] : the motor exam was normal [No Focal Deficits] : no focal deficits [Oriented To Time, Place, And Person] : oriented to person, place, and time [Impaired Insight] : insight and judgment were intact [Affect] : the affect was normal

## 2021-10-05 ENCOUNTER — APPOINTMENT (OUTPATIENT)
Dept: RHEUMATOLOGY | Facility: CLINIC | Age: 76
End: 2021-10-05
Payer: MEDICARE

## 2021-10-05 VITALS
DIASTOLIC BLOOD PRESSURE: 73 MMHG | RESPIRATION RATE: 18 BRPM | SYSTOLIC BLOOD PRESSURE: 125 MMHG | TEMPERATURE: 98 F | OXYGEN SATURATION: 98 % | HEART RATE: 81 BPM

## 2021-10-05 PROCEDURE — 96372 THER/PROPH/DIAG INJ SC/IM: CPT

## 2021-10-05 RX ORDER — DENOSUMAB 60 MG/ML
60 INJECTION SUBCUTANEOUS
Qty: 1 | Refills: 0 | Status: COMPLETED | OUTPATIENT
Start: 2021-09-30

## 2021-11-03 ENCOUNTER — RX RENEWAL (OUTPATIENT)
Age: 76
End: 2021-11-03

## 2021-11-04 ENCOUNTER — TRANSCRIPTION ENCOUNTER (OUTPATIENT)
Age: 76
End: 2021-11-04

## 2021-11-15 ENCOUNTER — APPOINTMENT (OUTPATIENT)
Dept: VASCULAR SURGERY | Facility: CLINIC | Age: 76
End: 2021-11-15
Payer: MEDICARE

## 2021-11-15 ENCOUNTER — APPOINTMENT (OUTPATIENT)
Dept: VASCULAR SURGERY | Facility: CLINIC | Age: 76
End: 2021-11-15

## 2021-11-15 VITALS
OXYGEN SATURATION: 96 % | HEIGHT: 63 IN | SYSTOLIC BLOOD PRESSURE: 130 MMHG | BODY MASS INDEX: 15.95 KG/M2 | WEIGHT: 90 LBS | HEART RATE: 90 BPM | DIASTOLIC BLOOD PRESSURE: 76 MMHG | RESPIRATION RATE: 14 BRPM | TEMPERATURE: 98.5 F

## 2021-11-15 DIAGNOSIS — I72.8 ANEURYSM OF OTHER SPECIFIED ARTERIES: ICD-10-CM

## 2021-11-15 PROCEDURE — 99203 OFFICE O/P NEW LOW 30 MIN: CPT

## 2021-11-20 ENCOUNTER — RX RENEWAL (OUTPATIENT)
Age: 76
End: 2021-11-20

## 2021-11-30 PROBLEM — I72.8 SPLENIC ARTERY ANEURYSM: Status: ACTIVE | Noted: 2021-11-30

## 2021-11-30 NOTE — PHYSICAL EXAM
[Normal Rate and Rhythm] : normal rate and rhythm [Abdominal Aorta] : Normal abdominal aorta [2+] : left 2+ [Ankle Swelling (On Exam)] : not present [Abdomen Tenderness] : ~T ~M No abdominal tenderness [Alert] : alert [Oriented to Person] : oriented to person [Oriented to Place] : oriented to place [Oriented to Time] : oriented to time [Calm] : calm [de-identified] : NAD [de-identified] : supple, no masses [de-identified] : unlabored breathing [de-identified] : FROM of all 4 extremities\par

## 2021-11-30 NOTE — ASSESSMENT
[FreeTextEntry1] : 76-year-old non-smoking female with past medical history of hypertension, diabetes, hypercholesterolemia, osteoarthritis, GERD, osteoporosis presenting for evaluation of asymptomatic distal splenic artery aneurysm discovered incidentally on CT scan.\par \par Images reviewed-- no evidence of meaningful growth over the past 2 years. Calcified aneurysm of distal splenic artery near hilum [Aneurysm Surgery] : aneurysm surgery none

## 2021-11-30 NOTE — HISTORY OF PRESENT ILLNESS
[FreeTextEntry1] : 76-year-old non-smoking female with past medical history of hypertension, diabetes, hypercholesterolemia, osteoarthritis, GERD, osteoporosis presenting for evaluation of asymptomatic distal splenic artery aneurysm discovered incidentally on CT scan.  Patient denies pain in the abdomen, flank, back.  She has no known history of aneurysms in the family.  CT scan demonstrated a 1.8 cm x 1.8 cm calcified distal splenic artery aneurysm and a second 0.7 cm diameter splenic aneurysm.  The calcified aneurysm was noted on a CT scan from July 2019 and measured 1.6 cm at the time.  Patient denies any complaints at this time.

## 2021-12-10 ENCOUNTER — APPOINTMENT (OUTPATIENT)
Dept: RHEUMATOLOGY | Facility: CLINIC | Age: 76
End: 2021-12-10
Payer: MEDICARE

## 2021-12-10 VITALS
OXYGEN SATURATION: 99 % | RESPIRATION RATE: 17 BRPM | HEART RATE: 60 BPM | SYSTOLIC BLOOD PRESSURE: 100 MMHG | DIASTOLIC BLOOD PRESSURE: 60 MMHG

## 2021-12-10 DIAGNOSIS — G89.29 PAIN IN RIGHT TOE(S): ICD-10-CM

## 2021-12-10 DIAGNOSIS — M79.674 PAIN IN RIGHT TOE(S): ICD-10-CM

## 2021-12-10 DIAGNOSIS — M79.675 PAIN IN RIGHT TOE(S): ICD-10-CM

## 2021-12-10 DIAGNOSIS — M79.642 PAIN IN RIGHT HAND: ICD-10-CM

## 2021-12-10 DIAGNOSIS — M79.641 PAIN IN RIGHT HAND: ICD-10-CM

## 2021-12-10 PROCEDURE — 81003 URINALYSIS AUTO W/O SCOPE: CPT | Mod: QW

## 2021-12-10 PROCEDURE — 36415 COLL VENOUS BLD VENIPUNCTURE: CPT

## 2021-12-10 PROCEDURE — 99215 OFFICE O/P EST HI 40 MIN: CPT | Mod: 25

## 2021-12-10 PROCEDURE — G2212 PROLONG OUTPT/OFFICE VIS: CPT

## 2021-12-10 RX ORDER — DULOXETINE HYDROCHLORIDE 20 MG/1
20 CAPSULE, DELAYED RELEASE PELLETS ORAL
Qty: 180 | Refills: 0 | Status: DISCONTINUED | COMMUNITY
Start: 2021-08-17 | End: 2021-12-10

## 2021-12-13 ENCOUNTER — APPOINTMENT (OUTPATIENT)
Dept: ULTRASOUND IMAGING | Facility: CLINIC | Age: 76
End: 2021-12-13
Payer: MEDICARE

## 2021-12-13 ENCOUNTER — OUTPATIENT (OUTPATIENT)
Dept: OUTPATIENT SERVICES | Facility: HOSPITAL | Age: 76
LOS: 1 days | End: 2021-12-13
Payer: MEDICARE

## 2021-12-13 DIAGNOSIS — Z00.8 ENCOUNTER FOR OTHER GENERAL EXAMINATION: ICD-10-CM

## 2021-12-13 DIAGNOSIS — Z96.649 PRESENCE OF UNSPECIFIED ARTIFICIAL HIP JOINT: Chronic | ICD-10-CM

## 2021-12-13 PROCEDURE — 93880 EXTRACRANIAL BILAT STUDY: CPT | Mod: 26

## 2021-12-13 PROCEDURE — 93880 EXTRACRANIAL BILAT STUDY: CPT

## 2021-12-15 LAB
BILIRUB UR QL STRIP: NEGATIVE
CLARITY UR: CLEAR
COLLECTION METHOD: NORMAL
GLUCOSE UR-MCNC: NEGATIVE
HCG UR QL: 0.2 EU/DL
HGB UR QL STRIP.AUTO: NEGATIVE
KETONES UR-MCNC: NEGATIVE
LEUKOCYTE ESTERASE UR QL STRIP: NEGATIVE
NITRITE UR QL STRIP: NEGATIVE
PH UR STRIP: 7
PROT UR STRIP-MCNC: NEGATIVE
SP GR UR STRIP: 1.01

## 2021-12-15 NOTE — CONSULT LETTER
[Dear  ___] : Dear  [unfilled], [Consult Letter:] : I had the pleasure of evaluating your patient, [unfilled]. [Please see my note below.] : Please see my note below. [Consult Closing:] : Thank you very much for allowing me to participate in the care of this patient.  If you have any questions, please do not hesitate to contact me. [Sincerely,] : Sincerely, [FreeTextEntry3] : Christian\par Myron I. Kleiner, M.D., FACR\par Chief, Division of Rheumatology\par Department of Medicine\par [facility]

## 2021-12-15 NOTE — HISTORY OF PRESENT ILLNESS
[FreeTextEntry1] : 76 year-old woman for follow up office visit regarding her joint symptoms and rheumatic diseases, including osteoporosis, osteoarthritis, chronic low back pain.\par \par Patient has intermittent pain bilateral first MTPs. Denies joint swelling, erythema and heat. She also has intermittent pain in bilateral PIPs and knees. Morning stiffness 10 minutes. Patient is exercising everyday by walking for 20-30 minutes. She uses the diclofenac topical gel on her knees q.2 days with relief. She discontinues Cymbalta secondary side effects. She restarted CBD oil 1 week ago without much relief. She has dry eyes and dry skin but denies dry mouth. Prolia injections q.6 months (October 2021) without side effect or complication. The patient continues calcium and vitamin D supplements. Patient denies rash or side effects with current medications. Patient is content with current medication regimen. \par \par PMH:\par GI follow ups regarding pancreatic cysts - stable\par Vascular surgeon follow up regarding aneurysms - stable

## 2021-12-15 NOTE — ASSESSMENT
[FreeTextEntry1] : Impression: 76 year-old woman for follow up office visit regarding her joint symptoms and rheumatic diseases, including osteoporosis, osteoarthritis, chronic low back pain.\par \par Patient has intermittent pain bilateral first MTPs, bilateral PIPs and knees all secondary to her osteoarthritis. She uses the diclofenac topical gel on her knees q.2 days with relief. She discontinues Cymbalta secondary side effects. She restarted CBD oil 1 week ago without much relief.  She has dry eyes and dry skin but denies dry mouth. Recent bone densitometry revealed osteoporosis with 30% improvement since last scan - will continue with Prolia injections q.6 months and monitor. Patient also continues calcium and vitamin D supplements for her osteoporosis. Patient denies rash or side effects with current medications. Patient is content with current medication regimen. \par \par Plan: \par I reviewed recent lab results with patient with extensive discussion\par I reviewed recent bone densitometry results with my analysis of the raw data with extensive discussion with patient \par Laboratory tests ordered today - see list below\par Diagnosis and prognosis discussed\par Continue current medications (other than those changed below)\par Patient declined NSAIDs\par Gabapentin 300 mg h.s.; if no better/side effects 7 days, increase b.i.d morning and supper, if no better/side effects in another 7 days increase to t.i.d. (Possible side effects explained) \par Physical Therapy\par Continue daily exercise at least 30 minutes per day--emphasized \par Artificial tears one drop each eye q.i.d. and p.r.n.(Possible side effects explained)\par Biotin mouthwash/spray q.i.d. and p.r.n.(Possible side effects explained)\par Oral hydration\par Return visit 3 months\par Total time for this office visit, including face-to-face time and nonface-to-face time, 85 minutes--including review of the chart and previous records, review of previous lab results, extensive discussion with the patient regarding lab results, review of previous imaging results, review of her bone densitometry including my analysis of the raw data with extensive discussion with the patient, detailed medication history, review of her medications going forward including the possible side effects, reviewed the impact of her rheumatic disease on her other medical problems, reviewed the impact of her other medical problems on her rheumatic disease

## 2021-12-15 NOTE — ADDENDUM
[FreeTextEntry1] : I, Slime Machado, acted solely as a scribe for Dr. Myron I. Kleiner, MD. on 12/10/2021 .\par \par All medical record entries made by the Scribe were at Dr. Myron I. Kleiner, MD, direction and personally dictated by me on 12/10/2021. I have personally reviewed the chart and agree that the record accurately reflects my personal performance of the history, physical exam, assessment and plan.

## 2021-12-20 LAB
ACE BLD-CCNC: <5 U/L
ALBUMIN SERPL ELPH-MCNC: 5.1 G/DL
ALP BLD-CCNC: 66 U/L
ALT SERPL-CCNC: 21 U/L
ANION GAP SERPL CALC-SCNC: 12 MMOL/L
AST SERPL-CCNC: 28 U/L
BASOPHILS # BLD AUTO: 0.05 K/UL
BASOPHILS NFR BLD AUTO: 1.1 %
BILIRUB SERPL-MCNC: 0.4 MG/DL
BUN SERPL-MCNC: 18 MG/DL
CALCIUM SERPL-MCNC: 10.1 MG/DL
CHLORIDE SERPL-SCNC: 104 MMOL/L
CK SERPL-CCNC: 110 U/L
CO2 SERPL-SCNC: 27 MMOL/L
CREAT SERPL-MCNC: 0.39 MG/DL
CRP SERPL-MCNC: <3 MG/L
DEPRECATED KAPPA LC FREE/LAMBDA SER: 1.2 RATIO
ENA SS-A AB SER IA-ACNC: <0.2 AL
ENA SS-B AB SER IA-ACNC: <0.2 AL
EOSINOPHIL # BLD AUTO: 0.36 K/UL
EOSINOPHIL NFR BLD AUTO: 7.6 %
ERYTHROCYTE [SEDIMENTATION RATE] IN BLOOD BY WESTERGREN METHOD: 5 MM/HR
GLUCOSE SERPL-MCNC: 98 MG/DL
HAV IGM SER QL: NONREACTIVE
HBV CORE IGG+IGM SER QL: NONREACTIVE
HBV CORE IGM SER QL: NONREACTIVE
HBV SURFACE AG SER QL: NONREACTIVE
HCT VFR BLD CALC: 44.2 %
HCV AB SER QL: NONREACTIVE
HCV S/CO RATIO: 0.1 S/CO
HGB BLD-MCNC: 13.9 G/DL
IGA SER QL IEP: 97 MG/DL
IGG SER QL IEP: 896 MG/DL
IGG SUBSET TOTAL IGG: 775 MG/DL
IGG1 SER-MCNC: 454 MG/DL
IGG2 SER-MCNC: 181 MG/DL
IGG3 SER-MCNC: 8 MG/DL
IGG4 SER-MCNC: 35 MG/DL
IGM SER QL IEP: 68 MG/DL
IMM GRANULOCYTES NFR BLD AUTO: 0.2 %
KAPPA LC CSF-MCNC: 0.81 MG/DL
KAPPA LC SERPL-MCNC: 0.97 MG/DL
LDH SERPL-CCNC: 233 U/L
LYMPHOCYTES # BLD AUTO: 1.16 K/UL
LYMPHOCYTES NFR BLD AUTO: 24.5 %
M PROTEIN SPEC IFE-MCNC: NORMAL
MAN DIFF?: NORMAL
MCHC RBC-ENTMCNC: 30.8 PG
MCHC RBC-ENTMCNC: 31.4 GM/DL
MCV RBC AUTO: 97.8 FL
MONOCYTES # BLD AUTO: 0.49 K/UL
MONOCYTES NFR BLD AUTO: 10.3 %
NEUTROPHILS # BLD AUTO: 2.67 K/UL
NEUTROPHILS NFR BLD AUTO: 56.3 %
PHOSPHATE SERPL-MCNC: 3.1 MG/DL
PLATELET # BLD AUTO: 199 K/UL
POTASSIUM SERPL-SCNC: 3.9 MMOL/L
PROT SERPL-MCNC: 7.2 G/DL
RBC # BLD: 4.52 M/UL
RBC # FLD: 13.7 %
SODIUM SERPL-SCNC: 143 MMOL/L
TSH SERPL-ACNC: 1.45 UIU/ML
WBC # FLD AUTO: 4.74 K/UL

## 2021-12-22 ENCOUNTER — NON-APPOINTMENT (OUTPATIENT)
Age: 76
End: 2021-12-22

## 2021-12-23 ENCOUNTER — NON-APPOINTMENT (OUTPATIENT)
Age: 76
End: 2021-12-23

## 2022-01-26 ENCOUNTER — RX RENEWAL (OUTPATIENT)
Age: 77
End: 2022-01-26

## 2022-02-02 ENCOUNTER — APPOINTMENT (OUTPATIENT)
Dept: RHEUMATOLOGY | Facility: CLINIC | Age: 77
End: 2022-02-02
Payer: MEDICARE

## 2022-02-02 VITALS — OXYGEN SATURATION: 98 % | HEIGHT: 63 IN | TEMPERATURE: 97.9 F | HEART RATE: 100 BPM | RESPIRATION RATE: 17 BRPM

## 2022-02-02 DIAGNOSIS — G89.29 OTHER CHRONIC PAIN: ICD-10-CM

## 2022-02-02 DIAGNOSIS — Z76.89 PERSONS ENCOUNTERING HEALTH SERVICES IN OTHER SPECIFIED CIRCUMSTANCES: ICD-10-CM

## 2022-02-02 PROCEDURE — 99213 OFFICE O/P EST LOW 20 MIN: CPT

## 2022-02-03 PROBLEM — Z76.89 ENCOUNTER FOR NEW MEDICATION PRESCRIPTION: Status: ACTIVE | Noted: 2022-02-03

## 2022-02-03 PROBLEM — G89.29 ENCOUNTER FOR CHRONIC PAIN MANAGEMENT: Status: ACTIVE | Noted: 2022-02-03

## 2022-02-14 ENCOUNTER — RX RENEWAL (OUTPATIENT)
Age: 77
End: 2022-02-14

## 2022-02-23 ENCOUNTER — TRANSCRIPTION ENCOUNTER (OUTPATIENT)
Age: 77
End: 2022-02-23

## 2022-03-01 ENCOUNTER — RX RENEWAL (OUTPATIENT)
Age: 77
End: 2022-03-01

## 2022-04-22 ENCOUNTER — APPOINTMENT (OUTPATIENT)
Dept: RHEUMATOLOGY | Facility: CLINIC | Age: 77
End: 2022-04-22
Payer: MEDICARE

## 2022-04-22 ENCOUNTER — LABORATORY RESULT (OUTPATIENT)
Age: 77
End: 2022-04-22

## 2022-04-22 VITALS
WEIGHT: 95 LBS | RESPIRATION RATE: 17 BRPM | TEMPERATURE: 98.3 F | OXYGEN SATURATION: 96 % | SYSTOLIC BLOOD PRESSURE: 130 MMHG | HEIGHT: 63 IN | HEART RATE: 103 BPM | DIASTOLIC BLOOD PRESSURE: 74 MMHG | BODY MASS INDEX: 16.83 KG/M2

## 2022-04-22 DIAGNOSIS — G89.29 LOW BACK PAIN, UNSPECIFIED: ICD-10-CM

## 2022-04-22 DIAGNOSIS — M70.62 TROCHANTERIC BURSITIS, LEFT HIP: ICD-10-CM

## 2022-04-22 DIAGNOSIS — M25.552 PAIN IN LEFT HIP: ICD-10-CM

## 2022-04-22 DIAGNOSIS — M65.9 SYNOVITIS AND TENOSYNOVITIS, UNSPECIFIED: ICD-10-CM

## 2022-04-22 DIAGNOSIS — M54.50 LOW BACK PAIN, UNSPECIFIED: ICD-10-CM

## 2022-04-22 PROCEDURE — 36415 COLL VENOUS BLD VENIPUNCTURE: CPT

## 2022-04-22 PROCEDURE — 81003 URINALYSIS AUTO W/O SCOPE: CPT | Mod: QW

## 2022-04-22 PROCEDURE — 99215 OFFICE O/P EST HI 40 MIN: CPT | Mod: 25

## 2022-04-30 LAB
ALBUMIN SERPL ELPH-MCNC: 5.2 G/DL
ALP BLD-CCNC: 83 U/L
ALT SERPL-CCNC: 20 U/L
ANION GAP SERPL CALC-SCNC: 15 MMOL/L
AST SERPL-CCNC: 28 U/L
BASOPHILS # BLD AUTO: 0.11 K/UL
BASOPHILS NFR BLD AUTO: 1.8 %
BILIRUB SERPL-MCNC: 0.4 MG/DL
BILIRUB UR QL STRIP: NORMAL
BUN SERPL-MCNC: 19 MG/DL
CALCIUM SERPL-MCNC: 10.2 MG/DL
CHLORIDE SERPL-SCNC: 103 MMOL/L
CK SERPL-CCNC: 204 U/L
CLARITY UR: CLEAR
CO2 SERPL-SCNC: 22 MMOL/L
COLLECTION METHOD: NORMAL
CREAT SERPL-MCNC: 0.32 MG/DL
CRP SERPL-MCNC: <3 MG/L
EGFR: 110 ML/MIN/1.73M2
EOSINOPHIL # BLD AUTO: 0 K/UL
EOSINOPHIL NFR BLD AUTO: 0 %
ERYTHROCYTE [SEDIMENTATION RATE] IN BLOOD BY WESTERGREN METHOD: 13 MM/HR
GLUCOSE SERPL-MCNC: 233 MG/DL
GLUCOSE UR-MCNC: NORMAL
HCG UR QL: 0.2 EU/DL
HCT VFR BLD CALC: 42.5 %
HGB BLD-MCNC: 13.2 G/DL
HGB UR QL STRIP.AUTO: NORMAL
KETONES UR-MCNC: NORMAL
LDH SERPL-CCNC: 261 U/L
LEUKOCYTE ESTERASE UR QL STRIP: NORMAL
LYMPHOCYTES # BLD AUTO: 0.26 K/UL
LYMPHOCYTES NFR BLD AUTO: 4.4 %
MAN DIFF?: NORMAL
MCHC RBC-ENTMCNC: 29.1 PG
MCHC RBC-ENTMCNC: 31.1 GM/DL
MCV RBC AUTO: 93.6 FL
MONOCYTES # BLD AUTO: 0.16 K/UL
MONOCYTES NFR BLD AUTO: 2.6 %
NEUTROPHILS # BLD AUTO: 5.49 K/UL
NEUTROPHILS NFR BLD AUTO: 91.2 %
NITRITE UR QL STRIP: NORMAL
PH UR STRIP: 6
PHOSPHATE SERPL-MCNC: 2.7 MG/DL
PLATELET # BLD AUTO: 226 K/UL
POTASSIUM SERPL-SCNC: 4.1 MMOL/L
PROT SERPL-MCNC: 7.3 G/DL
PROT UR STRIP-MCNC: NORMAL
RBC # BLD: 4.54 M/UL
RBC # FLD: 13.8 %
SODIUM SERPL-SCNC: 140 MMOL/L
SP GR UR STRIP: 1.01
WBC # FLD AUTO: 6.02 K/UL

## 2022-05-01 NOTE — HISTORY OF PRESENT ILLNESS
[FreeTextEntry1] : JENNI ALLAN is a 76 year old woman who presents for follow up office visit for further evaluation of joint symptoms and rheumatic diseases including osteoporosis, osteoarthritis and chronic low back pain.\par \par Patient has increased pain left hip especially with weightbearing. 10 days ago, she tripped and fell on left hip. PCP performed x-rays without fracture. Today, she followed up with PCP Dr.Lada Toure and diagnosed "inflammation" - treated with local steroid injection and Meloxicam 7.5 mg b.i.d. she also has bilateral knee pain.  Otherwise, she has no other recent joint pain. She did not yet start medical marijuana via Dr. Blunt. Patient started physical therapy March 2022 with some relief - she has not gone back since hip injury. She discontinued Advil and topical Diclofenac gel. Patient did not start Gabapentin after last visit. The patient continues calcium, vitamin D supplements and Prolia injections every 6 months (last October 5, 2021) without side effect or complication. Patient denies rash or side effects with current medications. Patient is content with current medication regimen. \par \par PMH:\par S/P coronavirus vaccine x4 without complication\par \par SH:\par Travelling to [place] in 4 days x4 days to visit family

## 2022-05-01 NOTE — ASSESSMENT
[FreeTextEntry1] : Impression: JENNI ALLAN is a 76 year old woman who presents for follow up office visit for further evaluation of joint symptoms and rheumatic diseases including osteoporosis, osteoarthritis and chronic low back pain.\par \par 10 days ago, patient tripped and fell on left hip with persistent pain especially with weightbearing, secondary to combination of her osteoarthritis and left trochanteric bursitis.. X-ray revealed no fracture. PCP and diagnosed "inflammation" - treated with local steroid injection today and prescribed meloxicam 7.5 mg b.i.d. She has left greater trochanteric bursitis contributing to her pain. Otherwise, she has no other recent joint pain with her osteoarthritis and chronic low back pain remaining under good control. On exam, she has dry eyes - will continue to monitor for Sjogren's Syndrome. Recent lab tests revealed no significant results or changes. She discontinued Advil and topical Diclofenac gel. Patient did not start Gabapentin after last visit. The patient continues calcium, vitamin D supplements and Prolia injections every 6 months (last October 5, 2021) for her osteoporosis, without side effect or complication.. Patient denies rash or side effects with current medications. Patient is content with current medication regimen.\par \par Plan: I reviewed recent lab results with patient with extensive discussion\par Laboratory tests ordered - see list below--with coordination of care\par Diagnosis and prognosis discussed\par Continue current medications (other than those changed below)\par Prolia 60 mg SQ q.6 months (Possible side effects explained including AVN of jaw) - To be authorized and performed--coordination of care with authorization team and nursing staff\par Continue Meloxicam 7.5 mg b.i.d. end of breakfast and supper x7 days only (in view of her age, I am reluctant to have her continue with it at that dose for too long), then decrease to 7.5 mg q.d. end of breakfast (Possible side effects explained including cardiovascular risk/MI/CVA) \par Restart Diclofenac gel 4 g to bilateral knees b.i.d. and left hip t.i.d. (Possible side effects explained including cardiovascular risk/MI/CVA) \par Prednisone 10 mg q.i.d. with food x3 days, t.i.d. x3 days, b.i.d. x3 days, 5 mg b.i.d. x3 days, 5 mg q.d. x3 days, then stop (Possible side effects explained including extensive discussion regarding risks including AVN requiring joint replacement) - patient declined\par Return visit 3 months

## 2022-05-01 NOTE — CONSULT LETTER
[Dear  ___] : Dear  [unfilled], [Consult Letter:] : I had the pleasure of evaluating your patient, [unfilled]. [Please see my note below.] : Please see my note below. [Consult Closing:] : Thank you very much for allowing me to participate in the care of this patient.  If you have any questions, please do not hesitate to contact me. [Sincerely,] : Sincerely, [FreeTextEntry3] : Christian\par Myron I. Kleiner, M.D., FACR\par Chief, Division of Rheumatology\par Department of Medicine\par [facility]

## 2022-05-01 NOTE — ADDENDUM
[FreeTextEntry1] : I, Slime Machado, acted solely as a scribe for Dr. Myron I. Kleiner, MD. on 04/22/2022.

## 2022-05-18 ENCOUNTER — APPOINTMENT (OUTPATIENT)
Dept: RHEUMATOLOGY | Facility: CLINIC | Age: 77
End: 2022-05-18
Payer: MEDICARE

## 2022-05-18 ENCOUNTER — MED ADMIN CHARGE (OUTPATIENT)
Age: 77
End: 2022-05-18

## 2022-05-18 VITALS
RESPIRATION RATE: 18 BRPM | TEMPERATURE: 98 F | SYSTOLIC BLOOD PRESSURE: 117 MMHG | OXYGEN SATURATION: 96 % | DIASTOLIC BLOOD PRESSURE: 70 MMHG | HEART RATE: 82 BPM

## 2022-05-18 PROCEDURE — 96374 THER/PROPH/DIAG INJ IV PUSH: CPT

## 2022-05-18 RX ORDER — DENOSUMAB 60 MG/ML
60 INJECTION SUBCUTANEOUS
Qty: 1 | Refills: 0 | Status: COMPLETED | OUTPATIENT
Start: 2022-05-18

## 2022-07-11 ENCOUNTER — RX RENEWAL (OUTPATIENT)
Age: 77
End: 2022-07-11

## 2022-07-29 ENCOUNTER — TRANSCRIPTION ENCOUNTER (OUTPATIENT)
Age: 77
End: 2022-07-29

## 2022-09-08 ENCOUNTER — APPOINTMENT (OUTPATIENT)
Dept: RHEUMATOLOGY | Facility: CLINIC | Age: 77
End: 2022-09-08

## 2022-10-14 ENCOUNTER — APPOINTMENT (OUTPATIENT)
Dept: RHEUMATOLOGY | Facility: CLINIC | Age: 77
End: 2022-10-14

## 2022-10-14 ENCOUNTER — LABORATORY RESULT (OUTPATIENT)
Age: 77
End: 2022-10-14

## 2022-10-14 VITALS
DIASTOLIC BLOOD PRESSURE: 70 MMHG | SYSTOLIC BLOOD PRESSURE: 120 MMHG | HEART RATE: 100 BPM | HEIGHT: 63 IN | OXYGEN SATURATION: 97 % | TEMPERATURE: 98.1 F

## 2022-10-14 DIAGNOSIS — U07.1 COVID-19: ICD-10-CM

## 2022-10-14 DIAGNOSIS — Z92.29 PERSONAL HISTORY OF OTHER DRUG THERAPY: ICD-10-CM

## 2022-10-14 DIAGNOSIS — M21.952 UNSPECIFIED ACQUIRED DEFORMITY OF RIGHT THIGH: ICD-10-CM

## 2022-10-14 DIAGNOSIS — M21.951 UNSPECIFIED ACQUIRED DEFORMITY OF RIGHT THIGH: ICD-10-CM

## 2022-10-14 PROCEDURE — 99215 OFFICE O/P EST HI 40 MIN: CPT | Mod: 25

## 2022-10-14 PROCEDURE — 36415 COLL VENOUS BLD VENIPUNCTURE: CPT

## 2022-10-14 PROCEDURE — G2212 PROLONG OUTPT/OFFICE VIS: CPT

## 2022-10-14 PROCEDURE — 81003 URINALYSIS AUTO W/O SCOPE: CPT | Mod: QW

## 2022-10-14 RX ORDER — ACETAMINOPHEN 500 MG/1
500 TABLET, COATED ORAL
Qty: 100 | Refills: 0 | Status: DISCONTINUED | COMMUNITY
Start: 2020-10-25 | End: 2022-10-14

## 2022-10-14 RX ORDER — GABAPENTIN 300 MG/1
300 CAPSULE ORAL
Qty: 90 | Refills: 0 | Status: DISCONTINUED | COMMUNITY
Start: 2021-12-10 | End: 2022-10-14

## 2022-10-14 RX ORDER — MELOXICAM 7.5 MG/1
7.5 TABLET ORAL
Qty: 100 | Refills: 0 | Status: DISCONTINUED | COMMUNITY
End: 2022-10-14

## 2022-10-16 PROBLEM — U07.1 COVID-19 VIRUS INFECTION: Status: RESOLVED | Noted: 2022-10-16 | Resolved: 2022-10-16

## 2022-10-16 PROBLEM — Z92.29 COVID-19 VACCINE SERIES COMPLETED: Status: RESOLVED | Noted: 2022-10-16 | Resolved: 2022-10-16

## 2022-10-16 LAB
ALBUMIN SERPL ELPH-MCNC: 5 G/DL
ALP BLD-CCNC: 60 U/L
ALT SERPL-CCNC: 21 U/L
ANION GAP SERPL CALC-SCNC: 13 MMOL/L
AST SERPL-CCNC: 30 U/L
BASOPHILS # BLD AUTO: 0.05 K/UL
BASOPHILS NFR BLD AUTO: 1 %
BILIRUB SERPL-MCNC: 0.4 MG/DL
BILIRUB UR QL STRIP: NEGATIVE
BUN SERPL-MCNC: 14 MG/DL
CALCIUM SERPL-MCNC: 9.9 MG/DL
CHLORIDE SERPL-SCNC: 104 MMOL/L
CLARITY UR: CLEAR
CO2 SERPL-SCNC: 26 MMOL/L
COLLECTION METHOD: NORMAL
CREAT SERPL-MCNC: 0.36 MG/DL
CRP SERPL-MCNC: <3 MG/L
EGFR: 106 ML/MIN/1.73M2
ENA SS-A AB SER IA-ACNC: <0.2 AL
ENA SS-B AB SER IA-ACNC: <0.2 AL
EOSINOPHIL # BLD AUTO: 0.23 K/UL
EOSINOPHIL NFR BLD AUTO: 4.7 %
ERYTHROCYTE [SEDIMENTATION RATE] IN BLOOD BY WESTERGREN METHOD: 12 MM/HR
GLUCOSE SERPL-MCNC: 98 MG/DL
GLUCOSE UR-MCNC: NEGATIVE
HCG UR QL: 0.2 EU/DL
HCT VFR BLD CALC: 41.5 %
HGB BLD-MCNC: 13.9 G/DL
HGB UR QL STRIP.AUTO: NEGATIVE
IMM GRANULOCYTES NFR BLD AUTO: 0.2 %
KETONES UR-MCNC: NEGATIVE
LDH SERPL-CCNC: 223 U/L
LEUKOCYTE ESTERASE UR QL STRIP: NEGATIVE
LYMPHOCYTES # BLD AUTO: 1.06 K/UL
LYMPHOCYTES NFR BLD AUTO: 21.6 %
MAN DIFF?: NORMAL
MCHC RBC-ENTMCNC: 29.8 PG
MCHC RBC-ENTMCNC: 33.5 GM/DL
MCV RBC AUTO: 88.9 FL
MONOCYTES # BLD AUTO: 0.62 K/UL
MONOCYTES NFR BLD AUTO: 12.6 %
NEUTROPHILS # BLD AUTO: 2.94 K/UL
NEUTROPHILS NFR BLD AUTO: 59.9 %
NITRITE UR QL STRIP: NEGATIVE
PH UR STRIP: 6
PHOSPHATE SERPL-MCNC: 3.9 MG/DL
PLATELET # BLD AUTO: 178 K/UL
POTASSIUM SERPL-SCNC: 4.1 MMOL/L
PROT SERPL-MCNC: 6.9 G/DL
PROT UR STRIP-MCNC: NEGATIVE
RBC # BLD: 4.67 M/UL
RBC # FLD: 14.7 %
SODIUM SERPL-SCNC: 142 MMOL/L
SP GR UR STRIP: 1
T3 SERPL-MCNC: 111 NG/DL
T3RU NFR SERPL: 1 TBI
T4 SERPL-MCNC: 6.9 UG/DL
TSH SERPL-ACNC: 1.96 UIU/ML
WBC # FLD AUTO: 4.91 K/UL

## 2022-10-16 NOTE — ASSESSMENT
[FreeTextEntry1] : Impression: JENNI ALLAN is a 77 year old woman who presents for follow up office visit for further evaluation of joint symptoms and rheumatic diseases including osteoporosis, osteoarthritis and chronic low back pain.\par \par Patient has pain bilateral knees with difficulty walking and with stairs secondary to her osteoarthritis - topical Diclofenac gel b.i.d. p.r.n. with some relief. Her chronic low back pain is under good control at this time.  She has persistent decreased range of motion as well as muscle atrophy bilateral shoulders.  She also has lost some additional weight.  Her on exam, she has dry eyes - will continue to monitor for Sjogren's Syndrome.  Did not start Gabapentin as discussed last visit secondary to she did not like how she felt when she took it in the past.. Discontinued Meloxicam secondary to fear of side effects (GI Upset).the left trochanteric bursitis which she had previously has resolved.  The patient continues calcium, vitamin D supplements and Prolia injections every 6 months (last May 18, 2022) without side effect or complication for her osteoporosis. Patient denies rash or side effects with current medications. Patient is content with current medication regimen. \par \par Plan: I reviewed recent lab results with patient with extensive discussion\par Laboratory tests ordered - see list below - with coordination of care\par Diagnosis and prognosis discussed\par Continue current medications (other than those changed below)\par Patient declined all oral NSAIDs\par Tylenol 1000 mg t.i.d. p.r.n. joint pain or Tylenol 1300 mg b.i.d. p.r.n. joint pain (possible side effects explained)\par Prolia 60 mg SQ q.6 months (Possible side effects explained including AVN of jaw) - with coordination of care - to be authorized and performed after November 21, 2022 - patient already scheduled November 30, 2022\par Increase topical Diclofenac gel 4 g to each knees t.i.d. (Possible side effects explained including cardiovascular risk/MI/CVA) \par Bilateral Shoulder exercises - instructions discussed with patient--extensive discussion and demonstration of the exercises\par Physical Therapy regarding bilateral shoulders and to continue strengthening bilateral lower extremities.--with coordination of care\par Nutritional consultation via PCP--to help her nutritional status\par Patient is requesting handicap parking permit secondary to her difficulty walking and chronic bilateral knee pain--temporary permit x6 months--with coordination of care\par Return visit 4 months\par Total time for this office visit, including face-to-face time and non-face-to-face time, 85 minutes--- including review of the chart and previous records, review of previous lab results with extensive discussion with the patient, ordering lab tests with coordination of care, review of recent imaging reports/x-ray results, filling out the handicap parking permit application with coordination of care, demonstrated to the patient the bilateral shoulder exercises that I am recommending she perform daily, detailed medication history, ordering physical therapy with coordination of care, review of medications going forward with their possible side effects, reviewed the impact of the patient's rheumatic disease on their other medical problems, reviewed the impact of the patient's other medical problems on their rheumatic disease

## 2022-10-16 NOTE — HISTORY OF PRESENT ILLNESS
[FreeTextEntry1] : JENNI ALLAN is a 77 year old woman who presents for follow up office visit for further evaluation of joint symptoms and rheumatic diseases including osteoporosis, osteoarthritis and chronic low back pain.\par \par Note: Patient last seen April 2022\par \par Patient feels fairly well. She has pain bilateral knees especially with walking and stairs. Continues topical Diclofenac gel bilateral knees b.i.d. p.r.n. with some relief. Completed physical therapy which was helpful. Did not start Gabapentin as discussed last visit secondary to she did not like how she felt when she took it in the past.. Discontinued Meloxicam secondary to fear of side effects (GI Upset).The patient continues calcium, vitamin D supplements and Prolia injections every 6 months (last May 18, 2022) without side effect or complication. Patient denies rash or side effects with current medications. Patient is content with current medication regimen. \par \par PMH:\par July 2022 - coronavirus infection - not hospitalized--she had headache, sore throat x2 days, diarrhea with watery loose stools 2 times per day without BRBPR/melena--GI treated with Benefiber--resolved\par S/P coronavirus vaccine x4 without complication\par

## 2022-10-16 NOTE — ADDENDUM
[FreeTextEntry1] : I, Slime Machado, acted solely as a scribe for Dr. Myron I. Kleiner, MD. on 10/14/2022.

## 2022-10-16 NOTE — CONSULT LETTER
[Dear  ___] : Dear  [unfilled], [Consult Letter:] : I had the pleasure of evaluating your patient, [unfilled]. [Please see my note below.] : Please see my note below. [Consult Closing:] : Thank you very much for allowing me to participate in the care of this patient.  If you have any questions, please do not hesitate to contact me. [Sincerely,] : Sincerely, [FreeTextEntry3] : Christian\par Myron I. Kleiner, M.D., FACR\par Chief, Division of Rheumatology\par Department of Medicine\par [facility]

## 2022-10-17 ENCOUNTER — RX RENEWAL (OUTPATIENT)
Age: 77
End: 2022-10-17

## 2022-10-20 ENCOUNTER — TRANSCRIPTION ENCOUNTER (OUTPATIENT)
Age: 77
End: 2022-10-20

## 2022-10-22 LAB
ALBUMIN MFR SERPL ELPH: 66.1 %
ALBUMIN SERPL-MCNC: 4.6 G/DL
ALBUMIN/GLOB SERPL: 2 RATIO
ALPHA1 GLOB MFR SERPL ELPH: 3 %
ALPHA1 GLOB SERPL ELPH-MCNC: 0.2 G/DL
ALPHA2 GLOB MFR SERPL ELPH: 10.2 %
ALPHA2 GLOB SERPL ELPH-MCNC: 0.7 G/DL
B-GLOBULIN MFR SERPL ELPH: 10 %
B-GLOBULIN SERPL ELPH-MCNC: 0.7 G/DL
DEPRECATED KAPPA LC FREE/LAMBDA SER: 1.22 RATIO
GAMMA GLOB FLD ELPH-MCNC: 0.7 G/DL
GAMMA GLOB MFR SERPL ELPH: 10.7 %
IGA SER QL IEP: 93 MG/DL
IGG SER QL IEP: 797 MG/DL
IGM SER QL IEP: 75 MG/DL
INTERPRETATION SERPL IEP-IMP: NORMAL
KAPPA LC CSF-MCNC: 0.83 MG/DL
KAPPA LC SERPL-MCNC: 1.01 MG/DL
M PROTEIN SPEC IFE-MCNC: NORMAL
PROT SERPL-MCNC: 6.9 G/DL
PROT SERPL-MCNC: 6.9 G/DL

## 2022-11-30 ENCOUNTER — APPOINTMENT (OUTPATIENT)
Dept: RHEUMATOLOGY | Facility: CLINIC | Age: 77
End: 2022-11-30

## 2022-11-30 VITALS
SYSTOLIC BLOOD PRESSURE: 122 MMHG | OXYGEN SATURATION: 94 % | HEART RATE: 92 BPM | TEMPERATURE: 98.1 F | RESPIRATION RATE: 18 BRPM | DIASTOLIC BLOOD PRESSURE: 73 MMHG

## 2022-11-30 PROCEDURE — 96372 THER/PROPH/DIAG INJ SC/IM: CPT

## 2022-11-30 RX ORDER — DENOSUMAB 60 MG/ML
60 INJECTION SUBCUTANEOUS
Qty: 1 | Refills: 0 | Status: COMPLETED | OUTPATIENT
Start: 2022-11-28

## 2023-01-19 ENCOUNTER — TRANSCRIPTION ENCOUNTER (OUTPATIENT)
Age: 78
End: 2023-01-19

## 2023-02-12 ENCOUNTER — RX RENEWAL (OUTPATIENT)
Age: 78
End: 2023-02-12

## 2023-03-07 ENCOUNTER — APPOINTMENT (OUTPATIENT)
Dept: RHEUMATOLOGY | Facility: CLINIC | Age: 78
End: 2023-03-07

## 2023-03-24 ENCOUNTER — EMERGENCY (EMERGENCY)
Facility: HOSPITAL | Age: 78
LOS: 1 days | Discharge: HOME | End: 2023-03-24
Attending: EMERGENCY MEDICINE
Payer: MEDICARE

## 2023-03-24 VITALS
WEIGHT: 95.9 LBS | HEIGHT: 63 IN | TEMPERATURE: 98 F | OXYGEN SATURATION: 98 % | SYSTOLIC BLOOD PRESSURE: 123 MMHG | RESPIRATION RATE: 18 BRPM | HEART RATE: 76 BPM | DIASTOLIC BLOOD PRESSURE: 71 MMHG

## 2023-03-24 DIAGNOSIS — Z96.649 PRESENCE OF UNSPECIFIED ARTIFICIAL HIP JOINT: Chronic | ICD-10-CM

## 2023-03-24 PROCEDURE — 99284 EMERGENCY DEPT VISIT MOD MDM: CPT

## 2023-03-24 PROCEDURE — 72125 CT NECK SPINE W/O DYE: CPT | Mod: 26,MG

## 2023-03-24 PROCEDURE — 99284 EMERGENCY DEPT VISIT MOD MDM: CPT | Mod: 25

## 2023-03-24 PROCEDURE — 73100 X-RAY EXAM OF WRIST: CPT | Mod: 26,59,LT

## 2023-03-24 PROCEDURE — 29125 APPL SHORT ARM SPLINT STATIC: CPT | Mod: LT

## 2023-03-24 PROCEDURE — 73110 X-RAY EXAM OF WRIST: CPT

## 2023-03-24 PROCEDURE — 73110 X-RAY EXAM OF WRIST: CPT | Mod: 26,LT

## 2023-03-24 PROCEDURE — 70450 CT HEAD/BRAIN W/O DYE: CPT | Mod: MG

## 2023-03-24 PROCEDURE — 96374 THER/PROPH/DIAG INJ IV PUSH: CPT | Mod: XU

## 2023-03-24 PROCEDURE — G1004: CPT

## 2023-03-24 PROCEDURE — 72125 CT NECK SPINE W/O DYE: CPT | Mod: MG

## 2023-03-24 PROCEDURE — 73100 X-RAY EXAM OF WRIST: CPT

## 2023-03-24 PROCEDURE — 25605 CLTX DST RDL FX/EPHYS SEP W/: CPT | Mod: LT

## 2023-03-24 PROCEDURE — 99285 EMERGENCY DEPT VISIT HI MDM: CPT | Mod: 25

## 2023-03-24 PROCEDURE — 70450 CT HEAD/BRAIN W/O DYE: CPT | Mod: 26,MG

## 2023-03-24 RX ORDER — MORPHINE SULFATE 50 MG/1
4 CAPSULE, EXTENDED RELEASE ORAL ONCE
Refills: 0 | Status: DISCONTINUED | OUTPATIENT
Start: 2023-03-24 | End: 2023-03-24

## 2023-03-24 RX ADMIN — MORPHINE SULFATE 4 MILLIGRAM(S): 50 CAPSULE, EXTENDED RELEASE ORAL at 21:15

## 2023-03-24 NOTE — ED ADULT NURSE NOTE - OBJECTIVE STATEMENT
Pt BIBEMS s/p trip and fall. Pt states she fell down a few steps. Denies head trauma or blood thinner use. C collar placed by EMS. Pt brought to CT scan upon arrival to ED. Denies n/v, numbness, tingling, headache or dizziness. Pt with laceration to lower lip. MD at bedside for further evaluation.

## 2023-03-24 NOTE — ED PROVIDER NOTE - NSICDXPASTMEDICALHX_GEN_ALL_CORE_FT
PAST MEDICAL HISTORY:  High blood pressure     Hyperlipidemia, unspecified hyperlipidemia     Osteoporosis

## 2023-03-24 NOTE — ED ADULT TRIAGE NOTE - CHIEF COMPLAINT QUOTE
pt BIBEMS s/p fall down 3 steps with L wrist deformity. -LOC, - blood thinners. Laceration to lip, bleeding controlled at this time. GCS 15. c collar placed by EMS.

## 2023-03-24 NOTE — ED PROVIDER NOTE - OBJECTIVE STATEMENT
77 year old female with hx of HTN, HLD, DM, osteoporosis, b/l hip replacements, b/l knee replacements who presents following a fall. Patient lives at home and this evening around 19:00 she had a trip and fall down 3 steps. Braced herself with her hands and injured her left wrist. Also sustained laceration to lower inner lip. Has some pain in her mouth and in left wrist. Called her son who called EMS. Accompanied by her son and family friend.

## 2023-03-24 NOTE — ED PROVIDER NOTE - PHYSICAL EXAMINATION
General: thin female, lying supine, in c-collar   Head: NC, lower inner lip with lac no active bleeding   EENT: EOMI, no scleral icterus, no septal hematoma, no ttp over face/nose   Cardiac: RRR, no apparent murmurs, no lower extremity edema  Respiratory:  no respiratory distress   Abdomen: soft, ND, NT, nonperitonitic  MSK/Vascular: dorsal displacement of left radial/ulnar joint in splint placed per EMS, 2+ radial pulse b/l   Neuro: AAOx3, sensation to light touch intact  Psych: calm, cooperative

## 2023-03-24 NOTE — ED PROVIDER NOTE - PATIENT PORTAL LINK FT
You can access the FollowMyHealth Patient Portal offered by [facility] by registering at the following website: [email or web address]. By joining [employer]’s FollowMyHealth portal, you will also be able to view your health information using other applications (apps) compatible with our system.

## 2023-03-25 PROCEDURE — 73100 X-RAY EXAM OF WRIST: CPT | Mod: 26,LT

## 2023-04-04 ENCOUNTER — APPOINTMENT (OUTPATIENT)
Dept: ORTHOPEDIC SURGERY | Facility: CLINIC | Age: 78
End: 2023-04-04

## 2023-06-05 ENCOUNTER — NON-APPOINTMENT (OUTPATIENT)
Age: 78
End: 2023-06-05

## 2023-06-05 RX ORDER — DENOSUMAB 60 MG/ML
60 INJECTION SUBCUTANEOUS
Qty: 1 | Refills: 0 | Status: DISCONTINUED | COMMUNITY
Start: 2020-04-23 | End: 2023-06-05

## 2023-06-05 RX ORDER — DENOSUMAB 60 MG/ML
60 INJECTION SUBCUTANEOUS
Qty: 1 | Refills: 0 | Status: DISCONTINUED | COMMUNITY
Start: 2019-09-27 | End: 2023-06-05

## 2023-06-06 ENCOUNTER — APPOINTMENT (OUTPATIENT)
Dept: RHEUMATOLOGY | Facility: CLINIC | Age: 78
End: 2023-06-06
Payer: MEDICARE

## 2023-06-06 VITALS
HEART RATE: 99 BPM | DIASTOLIC BLOOD PRESSURE: 62 MMHG | OXYGEN SATURATION: 97 % | SYSTOLIC BLOOD PRESSURE: 115 MMHG | TEMPERATURE: 96.7 F

## 2023-06-06 PROCEDURE — G2212 PROLONG OUTPT/OFFICE VIS: CPT

## 2023-06-06 PROCEDURE — 99215 OFFICE O/P EST HI 40 MIN: CPT | Mod: 25

## 2023-06-06 PROCEDURE — 36415 COLL VENOUS BLD VENIPUNCTURE: CPT

## 2023-06-11 LAB
25(OH)D3 SERPL-MCNC: 52.9 NG/ML
ACE BLD-CCNC: 13 U/L
ALBUMIN MFR SERPL ELPH: 67.9 %
ALBUMIN SERPL ELPH-MCNC: 4.9 G/DL
ALBUMIN SERPL-MCNC: 4.7 G/DL
ALBUMIN/GLOB SERPL: 2.1 RATIO
ALP BLD-CCNC: 69 U/L
ALPHA1 GLOB MFR SERPL ELPH: 2.9 %
ALPHA1 GLOB SERPL ELPH-MCNC: 0.2 G/DL
ALPHA2 GLOB MFR SERPL ELPH: 10.2 %
ALPHA2 GLOB SERPL ELPH-MCNC: 0.7 G/DL
ALT SERPL-CCNC: 14 U/L
ANION GAP SERPL CALC-SCNC: 13 MMOL/L
AST SERPL-CCNC: 24 U/L
B-GLOBULIN MFR SERPL ELPH: 9.5 %
B-GLOBULIN SERPL ELPH-MCNC: 0.7 G/DL
BILIRUB SERPL-MCNC: 0.4 MG/DL
BUN SERPL-MCNC: 14 MG/DL
CALCIUM SERPL-MCNC: 9.7 MG/DL
CALCIUM SERPL-MCNC: 9.7 MG/DL
CHLORIDE SERPL-SCNC: 105 MMOL/L
CK SERPL-CCNC: 98 U/L
CO2 SERPL-SCNC: 24 MMOL/L
CREAT SERPL-MCNC: 0.34 MG/DL
CRP SERPL-MCNC: <3 MG/L
DEPRECATED KAPPA LC FREE/LAMBDA SER: 1.21 RATIO
EGFR: 107 ML/MIN/1.73M2
ENA SS-A AB SER IA-ACNC: <0.2 AL
ENA SS-B AB SER IA-ACNC: <0.2 AL
ENDOMYSIUM IGA SER QL: NEGATIVE
ENDOMYSIUM IGA TITR SER: NORMAL
ERYTHROCYTE [SEDIMENTATION RATE] IN BLOOD BY WESTERGREN METHOD: 2 MM/HR
GAMMA GLOB FLD ELPH-MCNC: 0.7 G/DL
GAMMA GLOB MFR SERPL ELPH: 9.5 %
GLIADIN IGA SER QL: <5 UNITS
GLIADIN IGG SER QL: <5 UNITS
GLIADIN PEPTIDE IGA SER-ACNC: NEGATIVE
GLIADIN PEPTIDE IGG SER-ACNC: NEGATIVE
GLUCOSE SERPL-MCNC: 128 MG/DL
HAV IGM SER QL: NONREACTIVE
HBV CORE IGG+IGM SER QL: NONREACTIVE
HBV CORE IGM SER QL: NONREACTIVE
HBV SURFACE AG SER QL: NONREACTIVE
HCV AB SER QL: NONREACTIVE
HCV S/CO RATIO: 0.08 S/CO
IGA SER QL IEP: 82 MG/DL
IGG SER QL IEP: 631 MG/DL
IGM SER QL IEP: 64 MG/DL
INTERPRETATION SERPL IEP-IMP: NORMAL
KAPPA LC CSF-MCNC: 0.66 MG/DL
KAPPA LC SERPL-MCNC: 0.8 MG/DL
LDH SERPL-CCNC: 187 U/L
M PROTEIN SPEC IFE-MCNC: NORMAL
MAGNESIUM SERPL-MCNC: 2.1 MG/DL
PARATHYROID HORMONE INTACT: 40 PG/ML
PHOSPHATE SERPL-MCNC: 3.5 MG/DL
POTASSIUM SERPL-SCNC: 4.2 MMOL/L
PROT SERPL-MCNC: 6.9 G/DL
PROT SERPL-MCNC: 6.9 G/DL
PROT SERPL-MCNC: 7 G/DL
SODIUM SERPL-SCNC: 142 MMOL/L
TSH SERPL-ACNC: 1.36 UIU/ML
TTG IGA SER IA-ACNC: <1.2 U/ML
TTG IGA SER-ACNC: NEGATIVE
TTG IGG SER IA-ACNC: 1.9 U/ML
TTG IGG SER IA-ACNC: NEGATIVE

## 2023-06-11 RX ORDER — ESCITALOPRAM OXALATE 5 MG/1
TABLET, FILM COATED ORAL
Refills: 0 | Status: ACTIVE | COMMUNITY

## 2023-06-11 NOTE — HISTORY OF PRESENT ILLNESS
[FreeTextEntry1] : JENNI ALLAN is a 78 year old woman who presents for follow up office visit for further evaluation of joint symptoms and rheumatic diseases including osteoporosis, osteoarthritis and chronic low back pain.\par \par Note: Patient last seen October 2022\par \par Patient feels fairly well. 2 months ago, she lost her balance and fell-- fractured left wrist-- status post ORIF on April 11, 2023 with  at [place] without complication. Some pain in both shoulders and knees. She was doing shoulder exercises until her fall. Never did physical therapy for her shoulders. Currently participating with occupational therapy for her wrist. Occasional dry eyes and dry skin but no dry mouth or dry genitals, using artificial tears p.r.n. with relief. Topical Diclofenac gel bilateral knees b.i.d. p.r.n. with some relief-- has not used recently. The patient continues calcium, vitamin D supplements and Prolia injections every 6 months (last November 30, 2022) without side effect or complication. For unclear reasons, she is taking vitamin D once every other week instead of prescribed once weekly. Patient denies rash or side effects with current medications. Patient is content with current medication regimen. \par \par PMH:\par Unintentional weight loss (?amount)

## 2023-06-11 NOTE — REVIEW OF SYSTEMS
[Joint Pain] : joint pain [Negative] : Heme/Lymph [Recent Weight Loss (___ Lbs)] : recent [unfilled] ~Ulb weight loss [As Noted in HPI] : as noted in HPI [Dry Eyes] : dryness of the eyes

## 2023-06-11 NOTE — ADDENDUM
[FreeTextEntry1] : I, Wili Rodriguez, acted solely as a scribe for Dr. Myron I. Kleiner, MD. on 06/06/2023.

## 2023-06-11 NOTE — ASSESSMENT
[FreeTextEntry1] : Impression: JENNI ALLAN is a 78 year old woman who presents for follow up office visit for further evaluation of joint symptoms and rheumatic diseases including osteoporosis, osteoarthritis and chronic low back pain.\par \par Note: Patient last seen October 2022\par \par Patient feels fairly well. 2 months ago, she lost her balance and fell-- fractured left wrist-- status post ORIF on April 11, 2023 with  at [place] without complication-- I spoke to  HOMERO Dash at 's office over the phone today during the office visit-- previous x-rays did not show adequate healing and they feel it would be prudent to hold off on Prolia for at least another month-- also therefore, I would not start her on oral NSAIDs. Some pain in both shoulders and knees secondary to her osteoarthritis. She was doing shoulder exercises until her fall. Never did physical therapy for her shoulders. Currently participating with occupational therapy for her left wrist fracture. Her chronic low back pain is under good control at this time. She has persistent decreased ROM and muscle atrophy bilateral shoulders. She also has lost some additional weight. On exam, she has dry eyes - will continue to monitor for Sjogren's Syndrome. Topical Diclofenac gel bilateral knees b.i.d. p.r.n. with some relief-- has not used recently. Laboratory test results of October 2022 reveal no significant changes or results, with extensive discussion. The patient continues calcium, vitamin D supplements and Prolia injections every 6 months (last November 30, 2022) without side effect or complication for her osteoporosis. For unclear reasons, she is taking vitamin D once every other week instead of the prescribed once weekly. Patient denies rash or side effects with current medications. Patient is content with current medication regimen. \par \par Plan: I reviewed the patient's chart and previous records \par I reviewed previous lab results with patient with extensive discussion\par Laboratory tests ordered - see list below - with coordination of care-- we will fax results to patient's PCP\par Diagnosis and prognosis discussed\par Continue current medications (other than those changed below)\par Hold Prolia until Orthopedics determines adequate healing of wrist fracture-- we will reschedule for 1 month from now--coordination of care with the authorization team and nursing staff--- requested Dr. Moise to send me a consultation report\par I am reluctant to treat with NSAIDs in view of her current left wrist fracture --also patient had declined all oral NSAIDs secondary to fear of side effects\par Increase Diclofenac gel 2 g to each shoulder t.i.d. and 4 g to each knee b.i.d. p.r.n. (Possible side effects explained including cardiovascular risk/MI/CVA) \par Artificial tears one drop each eye q.i.d. and p.r.n.(Possible side effects explained)\par Biotene mouthwash/spray q.i.d. and p.r.n.(Possible side effects explained) \par Oral Hydration\par Patient declines oral medication for dryness \par Physical therapy regarding bilateral shoulders and knees-- patient declined for now, she wants to wait several months\par I recommend she improves her nutritional status-- emphasized--extensive discussion\par Follow up PCP regarding consideration of nutritionist consultation\par Return visit 4 months\par Total time for this office visit, including face-to-face time and non-face-to-face time, 85 minutes--- including review of the chart and previous records, review of previous lab results with extensive discussion with the patient, ordering lab tests with coordination of care, review of previous imaging reports/x-ray results, ordering of new x-rays with coordination of care, detailed medication history, telephone discussion with hand surgery HOMERO Dash during the office visit, extensive discussion regarding timing of her next Prolia injection as noted above with coordination of care with the authorization team and nursing staff,, review of medications going forward with their possible side effects, reviewed the impact of the patient's rheumatic disease on their other medical problems, reviewed the impact of the patient's other medical problems on their rheumatic disease

## 2023-06-11 NOTE — CONSULT LETTER
[Dear  ___] : Dear  [unfilled], [Consult Letter:] : I had the pleasure of evaluating your patient, [unfilled]. [Please see my note below.] : Please see my note below. [Consult Closing:] : Thank you very much for allowing me to participate in the care of this patient.  If you have any questions, please do not hesitate to contact me. [Sincerely,] : Sincerely, [FreeTextEntry3] : Christian\par Myron I. Kleiner, M.D., FACR\par Chief, Division of Rheumatology\par Department of Medicine\par [facility] [DrRo  ___] : Dr. HUNTLEY

## 2023-07-06 ENCOUNTER — NON-APPOINTMENT (OUTPATIENT)
Age: 78
End: 2023-07-06

## 2023-07-10 LAB
IGG SUBSET TOTAL IGG: 748 MG/DL
IGG1 SER-MCNC: 446 MG/DL
IGG2 SER-MCNC: 123 MG/DL
IGG3 SER-MCNC: 8 MG/DL
IGG4 SER-MCNC: 17 MG/DL

## 2023-07-25 ENCOUNTER — RX RENEWAL (OUTPATIENT)
Age: 78
End: 2023-07-25

## 2023-08-15 RX ORDER — DENOSUMAB 60 MG/ML
60 INJECTION SUBCUTANEOUS
Qty: 1 | Refills: 0 | Status: ACTIVE | COMMUNITY
Start: 2021-08-16

## 2023-08-22 ENCOUNTER — APPOINTMENT (OUTPATIENT)
Dept: RHEUMATOLOGY | Facility: CLINIC | Age: 78
End: 2023-08-22
Payer: MEDICARE

## 2023-08-22 VITALS
DIASTOLIC BLOOD PRESSURE: 74 MMHG | SYSTOLIC BLOOD PRESSURE: 118 MMHG | TEMPERATURE: 98.1 F | OXYGEN SATURATION: 98 % | RESPIRATION RATE: 16 BRPM | HEART RATE: 96 BPM

## 2023-08-22 PROCEDURE — 96372 THER/PROPH/DIAG INJ SC/IM: CPT

## 2023-08-22 RX ORDER — DENOSUMAB 60 MG/ML
60 INJECTION SUBCUTANEOUS
Qty: 1 | Refills: 0 | Status: COMPLETED | OUTPATIENT
Start: 2023-06-05

## 2023-08-23 ENCOUNTER — RX RENEWAL (OUTPATIENT)
Age: 78
End: 2023-08-23

## 2023-10-04 ENCOUNTER — RX RENEWAL (OUTPATIENT)
Age: 78
End: 2023-10-04

## 2023-10-10 ENCOUNTER — APPOINTMENT (OUTPATIENT)
Dept: GASTROENTEROLOGY | Facility: CLINIC | Age: 78
End: 2023-10-10
Payer: MEDICARE

## 2023-10-10 VITALS
HEIGHT: 63 IN | BODY MASS INDEX: 15.95 KG/M2 | HEART RATE: 94 BPM | SYSTOLIC BLOOD PRESSURE: 129 MMHG | DIASTOLIC BLOOD PRESSURE: 69 MMHG | WEIGHT: 90 LBS

## 2023-10-10 DIAGNOSIS — R10.13 EPIGASTRIC PAIN: ICD-10-CM

## 2023-10-10 PROCEDURE — 99214 OFFICE O/P EST MOD 30 MIN: CPT

## 2023-10-13 ENCOUNTER — APPOINTMENT (OUTPATIENT)
Dept: RHEUMATOLOGY | Facility: CLINIC | Age: 78
End: 2023-10-13
Payer: MEDICARE

## 2023-10-13 VITALS
DIASTOLIC BLOOD PRESSURE: 72 MMHG | RESPIRATION RATE: 17 BRPM | HEART RATE: 80 BPM | TEMPERATURE: 98 F | SYSTOLIC BLOOD PRESSURE: 122 MMHG | HEIGHT: 63 IN | WEIGHT: 90 LBS | OXYGEN SATURATION: 98 % | BODY MASS INDEX: 15.95 KG/M2

## 2023-10-13 DIAGNOSIS — M25.561 PAIN IN RIGHT KNEE: ICD-10-CM

## 2023-10-13 PROCEDURE — 81003 URINALYSIS AUTO W/O SCOPE: CPT | Mod: QW

## 2023-10-13 PROCEDURE — 99215 OFFICE O/P EST HI 40 MIN: CPT | Mod: 25

## 2023-10-13 PROCEDURE — 36415 COLL VENOUS BLD VENIPUNCTURE: CPT

## 2023-10-13 RX ORDER — FAMOTIDINE 20 MG/1
20 TABLET, FILM COATED ORAL
Qty: 60 | Refills: 5 | Status: DISCONTINUED | COMMUNITY
Start: 2023-01-19 | End: 2023-10-13

## 2023-10-13 RX ORDER — DICLOFENAC SODIUM 1% 10 MG/G
1 GEL TOPICAL
Qty: 300 | Refills: 3 | Status: ACTIVE | COMMUNITY
Start: 2022-10-14 | End: 1900-01-01

## 2023-10-13 RX ORDER — DENOSUMAB 60 MG/ML
60 INJECTION SUBCUTANEOUS
Qty: 1 | Refills: 0 | Status: ACTIVE | COMMUNITY
Start: 2020-07-09

## 2023-10-13 RX ADMIN — DENOSUMAB 0 MG/ML: 60 INJECTION SUBCUTANEOUS at 00:00

## 2023-10-30 LAB
ACE BLD-CCNC: 10 U/L
ALBUMIN MFR SERPL ELPH: 67.6 %
ALBUMIN SERPL ELPH-MCNC: 5.2 G/DL
ALBUMIN SERPL-MCNC: 5.1 G/DL
ALBUMIN/GLOB SERPL: 2.1 RATIO
ALP BLD-CCNC: 73 U/L
ALPHA1 GLOB MFR SERPL ELPH: 3.1 %
ALPHA1 GLOB SERPL ELPH-MCNC: 0.2 G/DL
ALPHA2 GLOB MFR SERPL ELPH: 9.5 %
ALPHA2 GLOB SERPL ELPH-MCNC: 0.7 G/DL
ALT SERPL-CCNC: 19 U/L
ANION GAP SERPL CALC-SCNC: 11 MMOL/L
AST SERPL-CCNC: 30 U/L
B-GLOBULIN MFR SERPL ELPH: 10 %
B-GLOBULIN SERPL ELPH-MCNC: 0.8 G/DL
BASOPHILS # BLD AUTO: 0.04 K/UL
BASOPHILS NFR BLD AUTO: 0.8 %
BILIRUB SERPL-MCNC: 0.5 MG/DL
BILIRUB UR QL STRIP: NEGATIVE
BUN SERPL-MCNC: 12 MG/DL
CALCIUM SERPL-MCNC: 9.7 MG/DL
CHLORIDE SERPL-SCNC: 103 MMOL/L
CK SERPL-CCNC: 105 U/L
CLARITY UR: CLEAR
CO2 SERPL-SCNC: 27 MMOL/L
COLLECTION METHOD: NORMAL
CREAT SERPL-MCNC: 0.31 MG/DL
CRP SERPL-MCNC: <3 MG/L
DEPRECATED KAPPA LC FREE/LAMBDA SER: 1.18 RATIO
EGFR: 109 ML/MIN/1.73M2
ENA SS-A AB SER IA-ACNC: <0.2 AL
ENA SS-B AB SER IA-ACNC: <0.2 AL
EOSINOPHIL # BLD AUTO: 0.21 K/UL
EOSINOPHIL NFR BLD AUTO: 4.4 %
ERYTHROCYTE [SEDIMENTATION RATE] IN BLOOD BY WESTERGREN METHOD: 5 MM/HR
GAMMA GLOB FLD ELPH-MCNC: 0.7 G/DL
GAMMA GLOB MFR SERPL ELPH: 9.8 %
GLUCOSE SERPL-MCNC: 119 MG/DL
GLUCOSE UR-MCNC: NEGATIVE
HAV IGM SER QL: NONREACTIVE
HBV CORE IGG+IGM SER QL: NONREACTIVE
HBV CORE IGM SER QL: NONREACTIVE
HBV SURFACE AG SER QL: NONREACTIVE
HCG UR QL: 0.2 EU/DL
HCT VFR BLD CALC: 44.3 %
HCV AB SER QL: NONREACTIVE
HCV S/CO RATIO: 0.11 S/CO
HGB BLD-MCNC: 13.7 G/DL
HGB UR QL STRIP.AUTO: NEGATIVE
IGA SER QL IEP: 103 MG/DL
IGG SER QL IEP: 722 MG/DL
IGG SER QL IEP: 722 MG/DL
IGG1 SER-MCNC: 510 MG/DL
IGG2 SER-MCNC: 142 MG/DL
IGG3 SER-MCNC: 12.9 MG/DL
IGG4 SER-MCNC: 22.8 MG/DL
IGM SER QL IEP: 78 MG/DL
IMM GRANULOCYTES NFR BLD AUTO: 0.4 %
INTERPRETATION SERPL IEP-IMP: NORMAL
KAPPA LC CSF-MCNC: 0.73 MG/DL
KAPPA LC SERPL-MCNC: 0.86 MG/DL
KETONES UR-MCNC: NEGATIVE
LDH SERPL-CCNC: 260 U/L
LEUKOCYTE ESTERASE UR QL STRIP: NEGATIVE
LYMPHOCYTES # BLD AUTO: 1 K/UL
LYMPHOCYTES NFR BLD AUTO: 21.1 %
M PROTEIN SPEC IFE-MCNC: NORMAL
MAGNESIUM SERPL-MCNC: 2.4 MG/DL
MAN DIFF?: NORMAL
MCHC RBC-ENTMCNC: 29.2 PG
MCHC RBC-ENTMCNC: 30.9 GM/DL
MCV RBC AUTO: 94.5 FL
MONOCYTES # BLD AUTO: 0.51 K/UL
MONOCYTES NFR BLD AUTO: 10.8 %
NEUTROPHILS # BLD AUTO: 2.96 K/UL
NEUTROPHILS NFR BLD AUTO: 62.5 %
NITRITE UR QL STRIP: NEGATIVE
PH UR STRIP: 0.7
PHOSPHATE SERPL-MCNC: 3 MG/DL
PLATELET # BLD AUTO: 200 K/UL
POTASSIUM SERPL-SCNC: 4.6 MMOL/L
PROT SERPL-MCNC: 7.5 G/DL
PROT UR STRIP-MCNC: NEGATIVE
RBC # BLD: 4.69 M/UL
RBC # FLD: 14.6 %
SODIUM SERPL-SCNC: 141 MMOL/L
SP GR UR STRIP: 1.01
WBC # FLD AUTO: 4.74 K/UL

## 2023-12-14 ENCOUNTER — NON-APPOINTMENT (OUTPATIENT)
Age: 78
End: 2023-12-14

## 2023-12-15 ENCOUNTER — TRANSCRIPTION ENCOUNTER (OUTPATIENT)
Age: 78
End: 2023-12-15

## 2023-12-18 ENCOUNTER — TRANSCRIPTION ENCOUNTER (OUTPATIENT)
Age: 78
End: 2023-12-18

## 2023-12-20 ENCOUNTER — TRANSCRIPTION ENCOUNTER (OUTPATIENT)
Age: 78
End: 2023-12-20

## 2023-12-21 ENCOUNTER — RESULT REVIEW (OUTPATIENT)
Age: 78
End: 2023-12-21

## 2023-12-21 ENCOUNTER — OUTPATIENT (OUTPATIENT)
Dept: OUTPATIENT SERVICES | Facility: HOSPITAL | Age: 78
LOS: 1 days | End: 2023-12-21
Payer: MEDICARE

## 2023-12-21 ENCOUNTER — APPOINTMENT (OUTPATIENT)
Dept: GASTROENTEROLOGY | Facility: GI CENTER | Age: 78
End: 2023-12-21
Payer: MEDICARE

## 2023-12-21 DIAGNOSIS — R93.89 ABNORMAL FINDINGS ON DIAGNOSTIC IMAGING OF OTHER SPECIFIED BODY STRUCTURES: ICD-10-CM

## 2023-12-21 DIAGNOSIS — Z96.649 PRESENCE OF UNSPECIFIED ARTIFICIAL HIP JOINT: Chronic | ICD-10-CM

## 2023-12-21 LAB
GLUCOSE BLDC GLUCOMTR-MCNC: 117 MG/DL — HIGH (ref 70–99)
GLUCOSE BLDC GLUCOMTR-MCNC: 117 MG/DL — HIGH (ref 70–99)

## 2023-12-21 PROCEDURE — 43239 EGD BIOPSY SINGLE/MULTIPLE: CPT

## 2023-12-21 PROCEDURE — 82962 GLUCOSE BLOOD TEST: CPT

## 2023-12-21 PROCEDURE — 88305 TISSUE EXAM BY PATHOLOGIST: CPT

## 2023-12-21 PROCEDURE — 88342 IMHCHEM/IMCYTCHM 1ST ANTB: CPT

## 2023-12-21 PROCEDURE — 88342 IMHCHEM/IMCYTCHM 1ST ANTB: CPT | Mod: 26

## 2023-12-21 PROCEDURE — 88305 TISSUE EXAM BY PATHOLOGIST: CPT | Mod: 26

## 2023-12-21 NOTE — PHYSICAL EXAM

## 2023-12-21 NOTE — ASSESSMENT
[FreeTextEntry1] : The previous encounters have been reviewed and there are no significant changes other than those items which were stated above. The patient's current medical status is stable for the procedure(s) to be performed today.

## 2023-12-28 ENCOUNTER — RX RENEWAL (OUTPATIENT)
Age: 78
End: 2023-12-28

## 2023-12-28 LAB
SURGICAL PATHOLOGY STUDY: SIGNIFICANT CHANGE UP
SURGICAL PATHOLOGY STUDY: SIGNIFICANT CHANGE UP

## 2024-01-19 ENCOUNTER — TRANSCRIPTION ENCOUNTER (OUTPATIENT)
Age: 79
End: 2024-01-19

## 2024-01-22 ENCOUNTER — TRANSCRIPTION ENCOUNTER (OUTPATIENT)
Age: 79
End: 2024-01-22

## 2024-01-29 ENCOUNTER — TRANSCRIPTION ENCOUNTER (OUTPATIENT)
Age: 79
End: 2024-01-29

## 2024-02-02 ENCOUNTER — TRANSCRIPTION ENCOUNTER (OUTPATIENT)
Age: 79
End: 2024-02-02

## 2024-02-05 ENCOUNTER — TRANSCRIPTION ENCOUNTER (OUTPATIENT)
Age: 79
End: 2024-02-05

## 2024-02-06 NOTE — PHYSICAL EXAM
Induction of labor per Dr Dotson at 38 weeks GA  [facility]  Arrive at 1100  Stereomood message sent to patient with IOL instructions     [General Appearance - Alert] : alert [General Appearance - In No Acute Distress] : in no acute distress [General Appearance - Well Nourished] : well nourished [General Appearance - Well Developed] : well developed [General Appearance - Well-Appearing] : healthy appearing [Sclera] : the sclera and conjunctiva were normal [PERRL With Normal Accommodation] : pupils were equal in size, round, and reactive to light [Extraocular Movements] : extraocular movements were intact [Outer Ear] : the ears and nose were normal in appearance [Neck Appearance] : the appearance of the neck was normal [Neck Cervical Mass (___cm)] : no neck mass was observed [Jugular Venous Distention Increased] : there was no jugular-venous distention [Thyroid Diffuse Enlargement] : the thyroid was not enlarged [Thyroid Nodule] : there were no palpable thyroid nodules [No CVA Tenderness] : no ~M costovertebral angle tenderness [No Spinal Tenderness] : no spinal tenderness [Motor Exam] : the motor exam was normal [No Focal Deficits] : no focal deficits [Lungs Percussion] : the lungs were normal to percussion [Heart Rate And Rhythm] : heart rate was normal and rhythm regular [Edema] : there was no peripheral edema [Abdomen Soft] : soft [Abdomen Tenderness] : non-tender [Abdomen Mass (___ Cm)] : no abdominal mass palpated [Cervical Lymph Nodes Enlarged Posterior Bilaterally] : posterior cervical [Cervical Lymph Nodes Enlarged Anterior Bilaterally] : anterior cervical [Supraclavicular Lymph Nodes Enlarged Bilaterally] : supraclavicular [Axillary Lymph Nodes Enlarged Bilaterally] : axillary [Skin Color & Pigmentation] : normal skin color and pigmentation [Skin Turgor] : normal skin turgor [] : no rash [Cranial Nerves] : cranial nerves 2-12 were intact [Deep Tendon Reflexes (DTR)] : deep tendon reflexes were 2+ and symmetric [Sensation] : the sensory exam was normal to light touch and pinprick [Oriented To Time, Place, And Person] : oriented to person, place, and time [Impaired Insight] : insight and judgment were intact [Affect] : the affect was normal [Mood] : the mood was normal [FreeTextEntry1] : Strength-5/5

## 2024-02-20 ENCOUNTER — TRANSCRIPTION ENCOUNTER (OUTPATIENT)
Age: 79
End: 2024-02-20

## 2024-02-23 ENCOUNTER — NON-APPOINTMENT (OUTPATIENT)
Age: 79
End: 2024-02-23

## 2024-02-26 ENCOUNTER — APPOINTMENT (OUTPATIENT)
Dept: RHEUMATOLOGY | Facility: CLINIC | Age: 79
End: 2024-02-26
Payer: MEDICARE

## 2024-02-26 VITALS
RESPIRATION RATE: 17 BRPM | OXYGEN SATURATION: 97 % | HEART RATE: 85 BPM | DIASTOLIC BLOOD PRESSURE: 77 MMHG | TEMPERATURE: 97.9 F | SYSTOLIC BLOOD PRESSURE: 132 MMHG

## 2024-02-26 PROCEDURE — 96372 THER/PROPH/DIAG INJ SC/IM: CPT

## 2024-02-26 RX ORDER — DENOSUMAB 60 MG/ML
60 INJECTION SUBCUTANEOUS
Qty: 1 | Refills: 0 | Status: COMPLETED | OUTPATIENT
Start: 2023-10-13

## 2024-03-31 ENCOUNTER — RX RENEWAL (OUTPATIENT)
Age: 79
End: 2024-03-31

## 2024-03-31 RX ORDER — FAMOTIDINE 20 MG/1
20 TABLET, FILM COATED ORAL TWICE DAILY
Qty: 180 | Refills: 0 | Status: ACTIVE | COMMUNITY
Start: 2020-06-25 | End: 1900-01-01

## 2024-04-24 ENCOUNTER — APPOINTMENT (OUTPATIENT)
Dept: GASTROENTEROLOGY | Facility: CLINIC | Age: 79
End: 2024-04-24
Payer: MEDICARE

## 2024-04-24 VITALS
DIASTOLIC BLOOD PRESSURE: 65 MMHG | HEIGHT: 63 IN | OXYGEN SATURATION: 98 % | WEIGHT: 84 LBS | BODY MASS INDEX: 14.88 KG/M2 | RESPIRATION RATE: 16 BRPM | HEART RATE: 70 BPM | SYSTOLIC BLOOD PRESSURE: 110 MMHG

## 2024-04-24 DIAGNOSIS — K31.A0 GASTRIC INTESTINAL METAPLASIA, UNSPECIFIED: ICD-10-CM

## 2024-04-24 DIAGNOSIS — K21.9 GASTRO-ESOPHAGEAL REFLUX DISEASE W/OUT ESOPHAGITIS: ICD-10-CM

## 2024-04-24 DIAGNOSIS — K86.2 CYST OF PANCREAS: ICD-10-CM

## 2024-04-24 DIAGNOSIS — Z80.0 FAMILY HISTORY OF MALIGNANT NEOPLASM OF DIGESTIVE ORGANS: ICD-10-CM

## 2024-04-24 PROCEDURE — 99214 OFFICE O/P EST MOD 30 MIN: CPT

## 2024-04-24 RX ORDER — SODIUM SULFATE, POTASSIUM SULFATE AND MAGNESIUM SULFATE 1.6; 3.13; 17.5 G/177ML; G/177ML; G/177ML
17.5-3.13-1.6 SOLUTION ORAL
Qty: 1 | Refills: 0 | Status: ACTIVE | COMMUNITY
Start: 2024-04-24 | End: 1900-01-01

## 2024-04-24 NOTE — ASSESSMENT
[FreeTextEntry1] : JENNI ALLAN is a 78 year old female, with a PMH of osteoporosis, arthritis, HLD, HTN, DM, who presents today for follow up of chronic GERD, colon cancer screening and pancreatic cyst.  Chronic GERD esophagitis and intestinal metaplasia in the body on EGD 2023, will consider repeat in 3 years symptoms well controlled on Famotidine 20mg PO BID, continue   Pancreatic Cyst CT in 12/2023 which showed increase in size of pancreatic body cyst, from 10 x 6 mm to 12 x 9 mm. Follow up imaging in 6 months, MRI Abdomen w/wo IVC ordered for 6/2024  Family h/o colon cancer in her father in his 50s, last colonoscopy 2019 Colonoscopy to be scheduled. I have discussed the indications, alternatives, benefits and potential risk including, but not limited to, bleeding, perforation, missed lesions and anesthesia complications. Bowel prep instructions discussed at length. All questions were answered. Pt is medically optimized. Labs ordered. Suprep sent to pharmacy.

## 2024-04-24 NOTE — PHYSICAL EXAM

## 2024-04-24 NOTE — HISTORY OF PRESENT ILLNESS
[de-identified] : 12/2023 - esophagitis, path - atrophic gastritis, intestinal metaplasia, no HP 2/2019 - normal, negative HP [FreeTextEntry1] : May 2019 - normal, recommended repeat in 5 years.

## 2024-04-24 NOTE — END OF VISIT
[FreeTextEntry3] : In addition to a nurse practitioner I also performed a history and physical examination and agree with the assessment and plans

## 2024-04-24 NOTE — REASON FOR VISIT
[Follow-up] : a follow-up of an existing diagnosis [FreeTextEntry1] : Small pancreatic cysts, family history of colon cancer and gastroesophageal reflux disease

## 2024-04-26 ENCOUNTER — TRANSCRIPTION ENCOUNTER (OUTPATIENT)
Age: 79
End: 2024-04-26

## 2024-04-26 DIAGNOSIS — Z12.11 ENCOUNTER FOR SCREENING FOR MALIGNANT NEOPLASM OF COLON: ICD-10-CM

## 2024-04-26 RX ORDER — PEG-3350, SODIUM SULFATE, SODIUM CHLORIDE, POTASSIUM CHLORIDE, SODIUM ASCORBATE AND ASCORBIC ACID 7.5-2.691G
100 KIT ORAL
Qty: 1 | Refills: 0 | Status: ACTIVE | COMMUNITY
Start: 2024-04-26 | End: 1900-01-01

## 2024-04-29 ENCOUNTER — TRANSCRIPTION ENCOUNTER (OUTPATIENT)
Age: 79
End: 2024-04-29

## 2024-05-06 ENCOUNTER — TRANSCRIPTION ENCOUNTER (OUTPATIENT)
Age: 79
End: 2024-05-06

## 2024-05-06 RX ORDER — POLYETHYLENE GLYCOL-3350 AND ELECTROLYTES WITH FLAVOR PACK 240; 5.84; 2.98; 6.72; 22.72 G/278.26G; G/278.26G; G/278.26G; G/278.26G; G/278.26G
240 POWDER, FOR SOLUTION ORAL
Qty: 1 | Refills: 0 | Status: ACTIVE | COMMUNITY
Start: 2024-04-24 | End: 1900-01-01

## 2024-05-10 ENCOUNTER — APPOINTMENT (OUTPATIENT)
Dept: RHEUMATOLOGY | Facility: CLINIC | Age: 79
End: 2024-05-10
Payer: MEDICARE

## 2024-05-10 VITALS
HEART RATE: 69 BPM | OXYGEN SATURATION: 99 % | SYSTOLIC BLOOD PRESSURE: 110 MMHG | WEIGHT: 84 LBS | HEIGHT: 63 IN | DIASTOLIC BLOOD PRESSURE: 68 MMHG | TEMPERATURE: 98.2 F | BODY MASS INDEX: 14.88 KG/M2

## 2024-05-10 DIAGNOSIS — M81.0 AGE-RELATED OSTEOPOROSIS W/OUT CURRENT PATHOLOGICAL FRACTURE: ICD-10-CM

## 2024-05-10 DIAGNOSIS — M25.512 PAIN IN RIGHT SHOULDER: ICD-10-CM

## 2024-05-10 DIAGNOSIS — Z79.899 ENCOUNTER FOR THERAPEUTIC DRUG LVL MONITORING: ICD-10-CM

## 2024-05-10 DIAGNOSIS — G89.29 PAIN IN RIGHT KNEE: ICD-10-CM

## 2024-05-10 DIAGNOSIS — Z51.81 ENCOUNTER FOR THERAPEUTIC DRUG LVL MONITORING: ICD-10-CM

## 2024-05-10 DIAGNOSIS — Z96.652 PRESENCE OF LEFT ARTIFICIAL KNEE JOINT: ICD-10-CM

## 2024-05-10 DIAGNOSIS — M25.562 PAIN IN RIGHT KNEE: ICD-10-CM

## 2024-05-10 DIAGNOSIS — M25.561 PAIN IN RIGHT KNEE: ICD-10-CM

## 2024-05-10 DIAGNOSIS — M21.921 UNSPECIFIED ACQUIRED DEFORMITY OF RIGHT UPPER ARM: ICD-10-CM

## 2024-05-10 DIAGNOSIS — M41.9 SCOLIOSIS, UNSPECIFIED: ICD-10-CM

## 2024-05-10 DIAGNOSIS — M15.9 POLYOSTEOARTHRITIS, UNSPECIFIED: ICD-10-CM

## 2024-05-10 DIAGNOSIS — M21.922 UNSPECIFIED ACQUIRED DEFORMITY OF RIGHT UPPER ARM: ICD-10-CM

## 2024-05-10 DIAGNOSIS — M25.511 PAIN IN RIGHT SHOULDER: ICD-10-CM

## 2024-05-10 DIAGNOSIS — G89.29 PAIN IN RIGHT SHOULDER: ICD-10-CM

## 2024-05-10 PROCEDURE — G2211 COMPLEX E/M VISIT ADD ON: CPT

## 2024-05-10 PROCEDURE — G2212 PROLONG OUTPT/OFFICE VIS: CPT

## 2024-05-10 PROCEDURE — 99215 OFFICE O/P EST HI 40 MIN: CPT

## 2024-06-09 ENCOUNTER — INPATIENT (INPATIENT)
Facility: HOSPITAL | Age: 79
LOS: 3 days | Discharge: HOME HEALTH | DRG: 605 | End: 2024-06-13
Attending: SURGERY | Admitting: SURGERY
Payer: MEDICARE

## 2024-06-09 VITALS
HEART RATE: 103 BPM | TEMPERATURE: 98 F | RESPIRATION RATE: 20 BRPM | DIASTOLIC BLOOD PRESSURE: 76 MMHG | OXYGEN SATURATION: 98 % | WEIGHT: 85.54 LBS | SYSTOLIC BLOOD PRESSURE: 158 MMHG

## 2024-06-09 DIAGNOSIS — Z96.649 PRESENCE OF UNSPECIFIED ARTIFICIAL HIP JOINT: Chronic | ICD-10-CM

## 2024-06-09 PROBLEM — Z51.81 ENCOUNTER FOR MONITORING DENOSUMAB THERAPY: Status: ACTIVE | Noted: 2020-07-09

## 2024-06-09 PROBLEM — Z96.652 STATUS POST TOTAL LEFT KNEE REPLACEMENT: Status: ACTIVE | Noted: 2024-06-09

## 2024-06-09 PROBLEM — M25.561 CHRONIC PAIN OF BOTH KNEES: Status: ACTIVE | Noted: 2022-04-22

## 2024-06-09 PROBLEM — M21.921 ACQUIRED DEFORMITY OF BOTH SHOULDERS: Status: ACTIVE | Noted: 2023-10-13

## 2024-06-09 PROBLEM — M41.9 SCOLIOSIS: Status: ACTIVE | Noted: 2019-09-27

## 2024-06-09 PROBLEM — M25.511 CHRONIC PAIN OF BOTH SHOULDERS: Status: ACTIVE | Noted: 2020-04-23

## 2024-06-09 PROCEDURE — 73080 X-RAY EXAM OF ELBOW: CPT | Mod: 26,LT

## 2024-06-09 PROCEDURE — 99285 EMERGENCY DEPT VISIT HI MDM: CPT | Mod: 25,GC

## 2024-06-09 PROCEDURE — 12016 RPR FE/E/EN/L/M 12.6-20.0 CM: CPT

## 2024-06-09 RX ORDER — TETANUS TOXOID, REDUCED DIPHTHERIA TOXOID AND ACELLULAR PERTUSSIS VACCINE, ADSORBED 5; 2.5; 8; 8; 2.5 [IU]/.5ML; [IU]/.5ML; UG/.5ML; UG/.5ML; UG/.5ML
0.5 SUSPENSION INTRAMUSCULAR ONCE
Refills: 0 | Status: COMPLETED | OUTPATIENT
Start: 2024-06-09 | End: 2024-06-09

## 2024-06-09 RX ORDER — IBUPROFEN 400 MG/1
400 TABLET, FILM COATED ORAL
Qty: 90 | Refills: 3 | Status: ACTIVE | COMMUNITY
Start: 2023-10-13

## 2024-06-09 RX ORDER — ACETAMINOPHEN 500 MG
650 TABLET ORAL ONCE
Refills: 0 | Status: COMPLETED | OUTPATIENT
Start: 2024-06-09 | End: 2024-06-09

## 2024-06-09 RX ORDER — LIDOCAINE HYDROCHLORIDE AND EPINEPHRINE 10; 10 MG/ML; UG/ML
10 INJECTION, SOLUTION INFILTRATION; PERINEURAL ONCE
Refills: 0 | Status: DISCONTINUED | OUTPATIENT
Start: 2024-06-09 | End: 2024-06-13

## 2024-06-09 RX ORDER — MELOXICAM 7.5 MG/1
7.5 TABLET ORAL
Qty: 30 | Refills: 1 | Status: DISCONTINUED | COMMUNITY
End: 2024-06-09

## 2024-06-09 RX ADMIN — DENOSUMAB 0 MG/ML: 60 INJECTION SUBCUTANEOUS at 00:00

## 2024-06-09 RX ADMIN — Medication 650 MILLIGRAM(S): at 20:46

## 2024-06-09 RX ADMIN — TETANUS TOXOID, REDUCED DIPHTHERIA TOXOID AND ACELLULAR PERTUSSIS VACCINE, ADSORBED 0.5 MILLILITER(S): 5; 2.5; 8; 8; 2.5 SUSPENSION INTRAMUSCULAR at 20:32

## 2024-06-09 NOTE — ED PROVIDER NOTE - OBJECTIVE STATEMENT
79y female w/ pmh of HTN, HLD presenting after a mechanical ground level fall occurring 30 min PTA. patient fell at home and struck her head and left elbow. denies LOC. denies any dizziness, lightheadedness, chest pain or SOB that may  have contributed. denies neck pain, back pain, hip pain, or lower extremity pain. denies weakness, numbness. Patient takes daily asa.

## 2024-06-09 NOTE — ED PROVIDER NOTE - ATTENDING CONTRIBUTION TO CARE
Dimple: I performed a face to face bedside interview with patient regarding history of present illness, review of symptoms and past medical history. I completed an independent physical exam.  I have discussed patient's plan of care with resident.   I agree with note as stated above including HISTORY OF PRESENT ILLNESS, HIV, PAST MEDICAL/SURGICAL/FAMILY/SOCIAL HISTORY, ALLERGIES AND HOME MEDICATIONS, REVIEW OF SYSTEMS, PHYSICAL EXAM, MEDICAL DECISION MAKING and any PROGRESS NOTES during the time I functioned as the attending physician for this patient unless otherwise noted. My brief assessment is as follows: 79y female on daily asa evaluated for ground level mechanical fall. patient with left eyebrow laceration and left elbow edema and tenderness. neurovascularly intact. will update tetanus, suture laceration, xray left elbow. CT head ordered given patients age and asa use.  Found to have a SAH, NSG and trauma  consulted.  Neurosurgery recommending CTA and every hour neurochecks.  Discussed with trauma attending, patient admitted to trauma service.

## 2024-06-09 NOTE — CONSULT LETTER
[Dear  ___] : Dear  [unfilled], [Consult Letter:] : I had the pleasure of evaluating your patient, [unfilled]. [Please see my note below.] : Please see my note below. [Consult Closing:] : Thank you very much for allowing me to participate in the care of this patient.  If you have any questions, please do not hesitate to contact me. [Sincerely,] : Sincerely, [FreeTextEntry3] : Myron Myron I. Kleiner, M.D., FACR Chief, Division of Rheumatology Department of Medicine [facility] [DrRo  ___] : Dr. HUNTLEY

## 2024-06-09 NOTE — ED ADULT NURSE NOTE - OBJECTIVE STATEMENT
pt comes into ED A&Ox4, s/p trip and fall onto wooden surface, able to get up with assistance. pt denies LOC, denies blood thinners. pt presents with laceration above L eyebrow and bruising on L arm. states she has a headache. no other complaints of pain or discomfort at this time.

## 2024-06-09 NOTE — PHYSICAL EXAM
[General Appearance - Alert] : alert [General Appearance - In No Acute Distress] : in no acute distress [General Appearance - Well Nourished] : well nourished [General Appearance - Well Developed] : well developed [General Appearance - Well-Appearing] : healthy appearing [Sclera] : the sclera and conjunctiva were normal [PERRL With Normal Accommodation] : pupils were equal in size, round, and reactive to light [Extraocular Movements] : extraocular movements were intact [Outer Ear] : the ears and nose were normal in appearance [Neck Appearance] : the appearance of the neck was normal [Neck Cervical Mass (___cm)] : no neck mass was observed [Jugular Venous Distention Increased] : there was no jugular-venous distention [Thyroid Diffuse Enlargement] : the thyroid was not enlarged [Lungs Percussion] : the lungs were normal to percussion [Heart Rate And Rhythm] : heart rate was normal and rhythm regular [Edema] : there was no peripheral edema [Abdomen Soft] : soft [Abdomen Tenderness] : non-tender [Abdomen Mass (___ Cm)] : no abdominal mass palpated [Cervical Lymph Nodes Enlarged Posterior Bilaterally] : posterior cervical [Cervical Lymph Nodes Enlarged Anterior Bilaterally] : anterior cervical [Supraclavicular Lymph Nodes Enlarged Bilaterally] : supraclavicular [Axillary Lymph Nodes Enlarged Bilaterally] : axillary [No CVA Tenderness] : no ~M costovertebral angle tenderness [No Spinal Tenderness] : no spinal tenderness [Skin Color & Pigmentation] : normal skin color and pigmentation [Skin Turgor] : normal skin turgor [] : no rash [Cranial Nerves] : cranial nerves 2-12 were intact [Sensation] : the sensory exam was normal to light touch and pinprick [Motor Exam] : the motor exam was normal [No Focal Deficits] : no focal deficits [Oriented To Time, Place, And Person] : oriented to person, place, and time [Impaired Insight] : insight and judgment were intact [Affect] : the affect was normal [Mood] : the mood was normal [FreeTextEntry1] : Strength-5/5

## 2024-06-09 NOTE — ASSESSMENT
[FreeTextEntry1] : Impression: 79-year-old woman for follow up office visit regarding her joint symptoms and rheumatic diseases, including osteoarthritis, osteoporosis, chronic low back pain.  Note: Patient was last seen October 2023  Patient has been having some right shoulder pain secondary to her osteoarthritis.  She also has some right knee pain with weightbearing secondary to her osteoarthritis.  In February she underwent revision left TKA at [facility] by Dr. Woods without complication.  She is not doing physical therapy.  Postop she was treated with meloxicam 7.5 mg once daily which she completed.  She now takes ibuprofen as needed which is not giving her adequate relief.  Her recent bone densitometry revealed persistent significant osteoporosis in her forearm where certain areas lost further bone density and this other areas were unchanged.  Of note, her spinal bone density is factitious [place] normal secondary to osteoarthritis and her hips could not be evaluated secondary to status post hip replacements.  Patient continues calcium and vitamin D supplements and Prolia injection every 6 months (last February 26, 2024) without side effect or complication.  She has been treated with Forteo in the past.  Patient denies rash or side effects with their medications.  Patient is content with their current treatment regimen.  Plan: I reviewed  chart and previous records  I reviewed previous lab results with patient--with extensive discussion Laboratory tests ordered today-see list below--patient declined----she wants to wait and see the results that her PCP has to avoid duplication--- I explained to the patient that I want to reevaluate for metabolic factors that would possibly impact her bone density Diagnosis and prognosis discussed Continue other current medications (other than those changed below) Increase ibuprofen 400 mg 3 times daily at end of meals on a regular basis--patient declined--she wants to take it only as needed--extensive discussion Increase diclofenac gel--patient declined to change it Discontinue Prolia IV zoledronate 5 mg every year (Possible side effects explained including bone cancer in lab animals)--patient declined--she wants to continue Prolia--extensive discussion Consider Evenity (Possible side effects explained including black box cardiac warning)--patient declined Prolia 60 mg subcutaneous (Possible side effects explained including AVN of jaw)--to be performed after August 29, 2024--with coordination of care with the authorization team and nursing staff Return visit 4 months All questions and concerns were addressed. Total time for this office visit, including face-to-face time and non-face-to-face time, 86 minutes--- including review of the chart and previous records, detailed review of her medical history, review of previous lab results with extensive discussion with the patient, ordering lab tests with coordination of care, review of recent bone densitometry results including my analysis of the raw data with extensive discussion with the patient, detailed medication history, review of medications going forward with their possible side effects, extensive discussions regarding the options to treat her persistent osteoporosis as noted above, reviewed the impact of the patient's rheumatic disease on their other medical problems, reviewed the impact of the patient's other medical problems on their rheumatic disease

## 2024-06-09 NOTE — ED PROCEDURE NOTE - CPROC ED PHYSICIAN PRESENCE1
[facility] ED  eMERGENCY dEPARTMENT eNCOUnter      Pt Name: Gela Haile  MRN: [id number]  [place] 1974  Date of evaluation: 12/18/2020  Provider: Adis Hernandez Dr     Chief Complaint   Patient presents with    Back Pain     Sacral back pain that started yesterday         HISTORY OF PRESENT ILLNESS   (Location/Symptom, Timing/Onset, Context/Setting,Quality, Duration, Modifying Factors, Severity)  Note limiting factors. Gela Haile is a44 y.o. male who presents to the emergency department with complaints of lower back discomfort that began yesterday. Patient reports about a week ago he was doing some movements at work when he had some pain in his back. He states that yesterday he developed more pain when he was getting up and down and out of the truck. He denies any bowel or bladder incontinence. Denies any saddle anesthesia. Denies any abdominal discomfort nausea or vomiting. Reports he is not take anything for his pain. He denies any fever or chills. He denies any other complaints at this time. HPI    Nursing Notes werereviewed. REVIEW OF SYSTEMS    (2-9 systems for level 4, 10 or more for level 5)     Review of Systems   Constitutional: Negative for chills, diaphoresis and fever. HENT: Negative for congestion, ear pain, rhinorrhea and sore throat. Eyes: Negative for discharge, redness and visual disturbance. Respiratory: Negative for cough, chest tightness, shortness of breath and wheezing. Cardiovascular: Negative for chest pain and palpitations. Gastrointestinal: Negative for abdominal pain, blood in stool, constipation, diarrhea, nausea and vomiting. Endocrine: Negative for polydipsia, polyphagia and polyuria. Genitourinary: Negative for decreased urine volume, difficulty urinating, dysuria, frequency and hematuria. Musculoskeletal: Positive for back pain. Negative for arthralgias and myalgias. Skin: Negative for pallor and rash. Allergic/Immunologic: Negative for food allergies and immunocompromised state. Neurological: Negative for dizziness, syncope, weakness and light-headedness. Hematological: Negative for adenopathy. Does not bruise/bleed easily. Psychiatric/Behavioral: Negative for behavioral problems and suicidal ideas. The patient is not nervous/anxious. Except as noted above the remainder of the review of systems was reviewed and negative. PAST MEDICAL HISTORY   History reviewed. No pertinent past medical history. SURGICALHISTORY       Past Surgical History:   Procedure Laterality Date    FRACTURE SURGERY      ankle scope    HERNIA REPAIR           CURRENT MEDICATIONS       Previous Medications    OMEPRAZOLE (PRILOSEC) 20 MG CAPSULE    Take 20 mg by mouth daily. ALLERGIES     Patient has no known allergies. FAMILY HISTORY     History reviewed. No pertinent family history. SOCIAL HISTORY       Social History     Socioeconomic History    Marital status:      Spouse name: None    Number of children: None    Years of education: None    Highest education level: None   Occupational History    None   Social Needs    Financial resource strain: None    Food insecurity     Worry: None     Inability: None    Transportation needs     Medical: None     Non-medical: None   Tobacco Use    Smoking status: Current Every Day Smoker     Packs/day: 0.25     Types: Cigarettes    Smokeless tobacco: Never Used   Substance and Sexual Activity    Alcohol use:  Yes    Drug use: No    Sexual activity: None   Lifestyle    Physical activity     Days per week: None     Minutes per session: None    Stress: None   Relationships    Social connections     Talks on phone: None     Gets together: None     Attends Confucianist service: None     Active member of club or organization: None     Attends meetings of clubs or organizations: None     Relationship status: None    Intimate partner violence Fear of current or ex partner: None     Emotionally abused: None     Physically abused: None     Forced sexual activity: None   Other Topics Concern    None   Social History Narrative    None       SCREENINGS      @FLOW([id number])@      PHYSICAL EXAM    (up to 7 for level 4, 8 or more for level 5)     ED Triage Vitals [12/18/20 1139]   BP Temp Temp src Pulse Resp SpO2 Height Weight   120/81 98.5 °F (36.9 °C) -- 107 16 96 % -- 280 lb (127 kg)       Physical Exam  Vitals signs and nursing note reviewed. Constitutional:       General: He is not in acute distress. Appearance: He is well-developed. He is not diaphoretic. HENT:      Head: Normocephalic and atraumatic. Right Ear: External ear normal.      Left Ear: External ear normal.      Mouth/Throat:      Pharynx: No oropharyngeal exudate. Eyes:      General: No scleral icterus. Right eye: No discharge. Left eye: No discharge. Conjunctiva/sclera: Conjunctivae normal.      Pupils: Pupils are equal, round, and reactive to light. Neck:      Musculoskeletal: Normal range of motion and neck supple. Thyroid: No thyromegaly. Trachea: No tracheal deviation. Cardiovascular:      Rate and Rhythm: Normal rate and regular rhythm. Heart sounds: No murmur. No friction rub. No gallop. Pulmonary:      Effort: Pulmonary effort is normal. No respiratory distress. Breath sounds: Normal breath sounds. No stridor. No wheezing. Abdominal:      General: Bowel sounds are normal. There is no distension. Palpations: Abdomen is soft. Tenderness: There is no abdominal tenderness. There is no guarding or rebound. Hernia: No hernia is present. Musculoskeletal: Normal range of motion. General: Tenderness present. No deformity. Comments: Patient has discomfort of the lower lumbar spine mainly on the right and in the paraspinous region. There is no rashes or lesions noted.   He is able to stand out of bed on his own go up on his toes back on his heels able to take a small squat down. No saddle anesthesia. Lymphadenopathy:      Cervical: No cervical adenopathy. Skin:     General: Skin is warm and dry. Capillary Refill: Capillary refill takes less than 2 seconds. Findings: No erythema or rash. Neurological:      Mental Status: He is alert and oriented to person, place, and time. Cranial Nerves: No cranial nerve deficit. Motor: No abnormal muscle tone. Deep Tendon Reflexes: Reflexes are normal and symmetric. Reflexes normal.   Psychiatric:         Behavior: Behavior normal.         DIAGNOSTIC RESULTS     EKG: All EKG's are interpreted by the Emergency Department Physician who either signs orCo-signs this chart in the absence of a cardiologist.      RADIOLOGY:   Non-plainfilm images such as CT, Ultrasound and MRI are read by the radiologist. Plain radiographic images are visualized and preliminarily interpreted by the emergency physician with the below findings:      Interpretationper the Radiologist below, if available at the time of this note:    Karishma   Final Result   1. No acute osseous abnormality. 2. Limited evaluation of disc spaces and spinal canal in the absence of   intrathecal contrast.  There appears to be disc bulges at L4-5 and L5-S1. EMERGENCY DEPARTMENT COURSE and DIFFERENTIAL DIAGNOSIS/MDM:   Vitals:    Vitals:    12/18/20 1139   BP: 120/81   Pulse: 107   Resp: 16   Temp: 98.5 °F (36.9 °C)   SpO2: 96%   Weight: 280 lb (127 kg)         MDM  42-year-old male who presents with lower back discomfort. On exam he has some tenderness in lumbar spine. Recurrent pain symptomatically and imaging rule acute fracture subluxation or other abnormality. Disc bulges are seen in the area of patient's discomfort. He has no acute cauda equina symptoms. He sees the [facility].  recommended follow-up with spine physician for further evaluation.   He states he has to go to the [place] and he will call the [place] today. Strict and specific return warnings have been given. He verbalized agreement this plan. Questions have been answered at length. Will otherwise return immediately to the ER with any new or worsening complaints. I discussed with him acute symptoms and spinal injuries.      Procedures    FINAL IMPRESSION      1. Bulging lumbar disc        DISPOSITION/PLAN   DISPOSITION Decision To Discharge 12/18/2020 01:48:34 PM      PATIENT REFERRED TO:  [facility] ED  [address]  [phone]    If symptoms worsen, As needed      DISCHARGE MEDICATIONS:  New Prescriptions    ORPHENADRINE (NORFLEX) 100 MG EXTENDED RELEASE TABLET    Take 1 tablet by mouth 2 times daily for 10 days    PREDNISONE (DELTASONE) 10 MG TABLET    Take 5 tablets by mouth daily for 5 days              Summation      Patient Course:      ED Medications administered this visit:    Medications   orphenadrine (NORFLEX) injection 60 mg (60 mg Intramuscular Given 12/18/20 1242)   dexamethasone (DECADRON) injection 4 mg (4 mg Intramuscular Given 12/18/20 1243)   ketorolac (TORADOL) injection 30 mg (30 mg Intramuscular Given 12/18/20 1242)       New Prescriptions from this visit:    New Prescriptions    ORPHENADRINE (NORFLEX) 100 MG EXTENDED RELEASE TABLET    Take 1 tablet by mouth 2 times daily for 10 days    PREDNISONE (DELTASONE) 10 MG TABLET    Take 5 tablets by mouth daily for 5 days       Follow-up:  [facility] ED  [address]  [phone]    If symptoms worsen, As needed        Final Impression:   1. Bulging lumbar disc               (Please note that portions of this note were completed with a voice recognition program.  Efforts were made to edit the dictations but occasionally words are mis-transcribed.)           Helen España PA-C  12/18/20 4167 I was present during the key portion of the procedure.

## 2024-06-09 NOTE — ED PROVIDER NOTE - NSICDXPASTMEDICALHX_GEN_ALL_CORE_FT
Patient's wife Crystal Ramires called to advise dimethyl fumarate requires a PA  Patient is out of medication  PA Ku [id number] for dimethyl fumarate submitted to [facility]  Awaiting determination  PAST MEDICAL HISTORY:  High blood pressure     Hyperlipidemia, unspecified hyperlipidemia     Osteoporosis

## 2024-06-09 NOTE — ED PROVIDER NOTE - CARE PLAN
1 Principal Discharge DX:	Forehead laceration   Principal Discharge DX:	Forehead laceration  Secondary Diagnosis:	SAH (subarachnoid hemorrhage)

## 2024-06-09 NOTE — ED PROVIDER NOTE - CLINICAL SUMMARY MEDICAL DECISION MAKING FREE TEXT BOX
79y female on daily asa evaluated for ground level mechanical fall. patient with left eyebrow laceration and left elbow edema and tenderness. will update tetanus, suture laceration, xray left elbow. CT head ordered given patients age and asa use. 79y female on daily asa evaluated for ground level mechanical fall. patient with left eyebrow laceration and left elbow edema and tenderness. neurovascularly intact. will update tetanus, suture laceration, xray left elbow. CT head ordered given patients age and asa use. 79y female on daily asa evaluated for ground level mechanical fall. patient with left eyebrow laceration and left elbow edema and tenderness. neurovascularly intact. will update tetanus, suture laceration, xray left elbow. CT head ordered given patients age and asa use.  Found to have a SAH, NSG and trauma  consulted.  Neurosurgery recommending CTA and every hour neurochecks.  Discussed with trauma attending, patient admitted to trauma service.

## 2024-06-09 NOTE — ED PROVIDER NOTE - PHYSICAL EXAMINATION
General: Awake, alert, lying in bed in NAD  HEENT: Normocephalic. 20cm laceration above left eyebrow. No scleral icterus or conjunctival injection. EOMI. Moist mucous membranes. Oropharynx clear. PERRL  Neck:. Soft and supple. nontender. full ROM  Cardiac: RRR, Peripheral pulses 2+ and symmetric. No LE edema.  Resp: Lungs CTAB. No accessory muscle use  Abd: Soft, non-tender, non-distended. No guarding, rebound, or rigidity.  Back: Spine midline and non-tender.   MSK: lateral left elbow edema and tenderness. left elbow with full ROM, no bony tenderness.   Skin: No rashes, abrasions, or lacerations.  Neuro: AO x 4. Moves all extremities symmetrically. Motor strength and sensation grossly intact. CN II-XII intact. 5/5 strength in all extremities. No dysmetria on finger to nose   Psych: Appropriate mood and affect.

## 2024-06-09 NOTE — HISTORY OF PRESENT ILLNESS
[FreeTextEntry1] : 79-year-old woman for follow up office visit regarding her joint symptoms and rheumatic diseases, including osteoarthritis, osteoporosis, chronic low back pain.  Note: Patient was last seen October 2023  Patient has been having some right knee pain with weightbearing.  Also some right shoulder pain.  She is using a cane.  She underwent appropriate 28th revision left TKA at [facility] by Dr. Woods without complication.  She is doing physical therapy.  She was prescribed meloxicam 7.5 mg once daily which she completed.  Patient continues calcium and vitamin D supplements and Prolia injections every 6 months (last injection 5/26/2024) without side effect or complication.  Patient denies rash or side effects with their medications.

## 2024-06-09 NOTE — ED ADULT NURSE NOTE - DRUG PRE-SCREENING (DAST -1)
Restart statin as clinically indicated  - holding for now, restarting at discharge   Statement Selected

## 2024-06-09 NOTE — ED ADULT TRIAGE NOTE - CHIEF COMPLAINT QUOTE
s/p trip and fall hit left side over eyebrow no loc no blood thinners, laceration to over eye on left side and swelling noted to left elbow

## 2024-06-09 NOTE — ED PROVIDER NOTE - NSICDXFAMILYHX_GEN_ALL_CORE_FT
Subjective   Patient ID: Kalyani Polk is a 82 y.o. female who presents for Arm Pain (Nerve pain RT arm. /).  Has  right sided CTS, which is flared up.  Had seen Dr. Gonzalez.  Reviewed note.  He had recommended night splinting to start, if she did not want to do invasive procedure.  The splinting seems to make it hurt worse.  Has pain at wrist and into hand with numbness and tingling.  Sometimes feels like it goes up the arm as well.  Feels that  strength is less.        Review of Systems    Objective   /76   Pulse 90   Wt 74.5 kg (164 lb 3.2 oz)   SpO2 96%   BMI 29.09 kg/m²     Physical Exam  Vitals reviewed.   Constitutional:       Appearance: Normal appearance.   Musculoskeletal:      Right wrist: Tenderness present. No swelling or deformity. Normal range of motion. Normal pulse.      Right hand: No swelling, deformity or tenderness. Normal range of motion. Normal sensation. Normal capillary refill. Normal pulse.   Neurological:      Mental Status: She is alert.           Assessment/Plan   Problem List Items Addressed This Visit       Carpal tunnel syndrome of right wrist - Primary    Relevant Medications    methylPREDNISolone (Medrol Dospak) 4 mg tablets           FAMILY HISTORY:  No pertinent family history

## 2024-06-10 DIAGNOSIS — S01.81XA LACERATION WITHOUT FOREIGN BODY OF OTHER PART OF HEAD, INITIAL ENCOUNTER: ICD-10-CM

## 2024-06-10 LAB
ALBUMIN SERPL ELPH-MCNC: 4.7 G/DL — SIGNIFICANT CHANGE UP (ref 3.3–5.2)
ALP SERPL-CCNC: 61 U/L — SIGNIFICANT CHANGE UP (ref 40–120)
ALT FLD-CCNC: 26 U/L — SIGNIFICANT CHANGE UP
ANION GAP SERPL CALC-SCNC: 17 MMOL/L — SIGNIFICANT CHANGE UP (ref 5–17)
APTT BLD: 31.5 SEC — SIGNIFICANT CHANGE UP (ref 24.5–35.6)
AST SERPL-CCNC: 38 U/L — HIGH
BASOPHILS # BLD AUTO: 0.06 K/UL — SIGNIFICANT CHANGE UP (ref 0–0.2)
BASOPHILS NFR BLD AUTO: 0.8 % — SIGNIFICANT CHANGE UP (ref 0–2)
BILIRUB SERPL-MCNC: 0.4 MG/DL — SIGNIFICANT CHANGE UP (ref 0.4–2)
BLD GP AB SCN SERPL QL: SIGNIFICANT CHANGE UP
BUN SERPL-MCNC: 9.6 MG/DL — SIGNIFICANT CHANGE UP (ref 8–20)
CALCIUM SERPL-MCNC: 9.6 MG/DL — SIGNIFICANT CHANGE UP (ref 8.4–10.5)
CHLORIDE SERPL-SCNC: 102 MMOL/L — SIGNIFICANT CHANGE UP (ref 96–108)
CO2 SERPL-SCNC: 23 MMOL/L — SIGNIFICANT CHANGE UP (ref 22–29)
CREAT SERPL-MCNC: 0.26 MG/DL — LOW (ref 0.5–1.3)
EGFR: 113 ML/MIN/1.73M2 — SIGNIFICANT CHANGE UP
EOSINOPHIL # BLD AUTO: 0.39 K/UL — SIGNIFICANT CHANGE UP (ref 0–0.5)
EOSINOPHIL NFR BLD AUTO: 5 % — SIGNIFICANT CHANGE UP (ref 0–6)
GLUCOSE BLDC GLUCOMTR-MCNC: 124 MG/DL — HIGH (ref 70–99)
GLUCOSE BLDC GLUCOMTR-MCNC: 99 MG/DL — SIGNIFICANT CHANGE UP (ref 70–99)
GLUCOSE SERPL-MCNC: 110 MG/DL — HIGH (ref 70–99)
HCT VFR BLD CALC: 41.8 % — SIGNIFICANT CHANGE UP (ref 34.5–45)
HGB BLD-MCNC: 13.3 G/DL — SIGNIFICANT CHANGE UP (ref 11.5–15.5)
IMM GRANULOCYTES NFR BLD AUTO: 0.4 % — SIGNIFICANT CHANGE UP (ref 0–0.9)
INR BLD: 1.06 RATIO — SIGNIFICANT CHANGE UP (ref 0.85–1.18)
LYMPHOCYTES # BLD AUTO: 1.43 K/UL — SIGNIFICANT CHANGE UP (ref 1–3.3)
LYMPHOCYTES # BLD AUTO: 18.4 % — SIGNIFICANT CHANGE UP (ref 13–44)
MCHC RBC-ENTMCNC: 26.9 PG — LOW (ref 27–34)
MCHC RBC-ENTMCNC: 31.8 GM/DL — LOW (ref 32–36)
MCV RBC AUTO: 84.6 FL — SIGNIFICANT CHANGE UP (ref 80–100)
MONOCYTES # BLD AUTO: 0.68 K/UL — SIGNIFICANT CHANGE UP (ref 0–0.9)
MONOCYTES NFR BLD AUTO: 8.8 % — SIGNIFICANT CHANGE UP (ref 2–14)
MRSA PCR RESULT.: SIGNIFICANT CHANGE UP
NEUTROPHILS # BLD AUTO: 5.18 K/UL — SIGNIFICANT CHANGE UP (ref 1.8–7.4)
NEUTROPHILS NFR BLD AUTO: 66.6 % — SIGNIFICANT CHANGE UP (ref 43–77)
PLATELET # BLD AUTO: 206 K/UL — SIGNIFICANT CHANGE UP (ref 150–400)
POTASSIUM SERPL-MCNC: 3.8 MMOL/L — SIGNIFICANT CHANGE UP (ref 3.5–5.3)
POTASSIUM SERPL-SCNC: 3.8 MMOL/L — SIGNIFICANT CHANGE UP (ref 3.5–5.3)
PROT SERPL-MCNC: 7.4 G/DL — SIGNIFICANT CHANGE UP (ref 6.6–8.7)
PROTHROM AB SERPL-ACNC: 11.7 SEC — SIGNIFICANT CHANGE UP (ref 9.5–13)
RBC # BLD: 4.94 M/UL — SIGNIFICANT CHANGE UP (ref 3.8–5.2)
RBC # FLD: 15.2 % — HIGH (ref 10.3–14.5)
S AUREUS DNA NOSE QL NAA+PROBE: SIGNIFICANT CHANGE UP
SODIUM SERPL-SCNC: 142 MMOL/L — SIGNIFICANT CHANGE UP (ref 135–145)
WBC # BLD: 7.77 K/UL — SIGNIFICANT CHANGE UP (ref 3.8–10.5)
WBC # FLD AUTO: 7.77 K/UL — SIGNIFICANT CHANGE UP (ref 3.8–10.5)

## 2024-06-10 PROCEDURE — 70498 CT ANGIOGRAPHY NECK: CPT | Mod: 26

## 2024-06-10 PROCEDURE — ZZZZZ: CPT

## 2024-06-10 PROCEDURE — 70450 CT HEAD/BRAIN W/O DYE: CPT | Mod: 26,59,MC,77

## 2024-06-10 PROCEDURE — 71045 X-RAY EXAM CHEST 1 VIEW: CPT | Mod: 26

## 2024-06-10 PROCEDURE — 99222 1ST HOSP IP/OBS MODERATE 55: CPT

## 2024-06-10 PROCEDURE — 70450 CT HEAD/BRAIN W/O DYE: CPT | Mod: 26,59,77

## 2024-06-10 PROCEDURE — 99223 1ST HOSP IP/OBS HIGH 75: CPT

## 2024-06-10 PROCEDURE — 70496 CT ANGIOGRAPHY HEAD: CPT | Mod: 26

## 2024-06-10 PROCEDURE — 72170 X-RAY EXAM OF PELVIS: CPT | Mod: 26

## 2024-06-10 PROCEDURE — 70450 CT HEAD/BRAIN W/O DYE: CPT | Mod: 26,59

## 2024-06-10 RX ORDER — AMLODIPINE BESYLATE 2.5 MG/1
5 TABLET ORAL DAILY
Refills: 0 | Status: DISCONTINUED | OUTPATIENT
Start: 2024-06-10 | End: 2024-06-13

## 2024-06-10 RX ORDER — PANTOPRAZOLE SODIUM 20 MG/1
1 TABLET, DELAYED RELEASE ORAL
Refills: 0 | DISCHARGE

## 2024-06-10 RX ORDER — SODIUM CHLORIDE 9 MG/ML
1000 INJECTION INTRAMUSCULAR; INTRAVENOUS; SUBCUTANEOUS
Refills: 0 | Status: DISCONTINUED | OUTPATIENT
Start: 2024-06-10 | End: 2024-06-10

## 2024-06-10 RX ORDER — METFORMIN HYDROCHLORIDE 850 MG/1
0.5 TABLET ORAL
Refills: 0 | DISCHARGE

## 2024-06-10 RX ORDER — FAMOTIDINE 10 MG/ML
20 INJECTION INTRAVENOUS
Refills: 0 | Status: DISCONTINUED | OUTPATIENT
Start: 2024-06-10 | End: 2024-06-13

## 2024-06-10 RX ORDER — ATORVASTATIN CALCIUM 80 MG/1
10 TABLET, FILM COATED ORAL AT BEDTIME
Refills: 0 | Status: DISCONTINUED | OUTPATIENT
Start: 2024-06-10 | End: 2024-06-13

## 2024-06-10 RX ORDER — PANTOPRAZOLE SODIUM 20 MG/1
40 TABLET, DELAYED RELEASE ORAL
Refills: 0 | Status: DISCONTINUED | OUTPATIENT
Start: 2024-06-10 | End: 2024-06-13

## 2024-06-10 RX ORDER — FAMOTIDINE 10 MG/ML
1 INJECTION INTRAVENOUS
Refills: 0 | DISCHARGE

## 2024-06-10 RX ORDER — DENOSUMAB 60 MG/ML
60 INJECTION SUBCUTANEOUS
Qty: 1 | Refills: 0 | Status: ACTIVE | COMMUNITY
Start: 2021-01-28

## 2024-06-10 RX ORDER — ESCITALOPRAM OXALATE 10 MG/1
10 TABLET, FILM COATED ORAL DAILY
Refills: 0 | Status: DISCONTINUED | OUTPATIENT
Start: 2024-06-10 | End: 2024-06-13

## 2024-06-10 RX ORDER — CHLORHEXIDINE GLUCONATE 213 G/1000ML
1 SOLUTION TOPICAL DAILY
Refills: 0 | Status: DISCONTINUED | OUTPATIENT
Start: 2024-06-10 | End: 2024-06-13

## 2024-06-10 RX ORDER — ESCITALOPRAM OXALATE 10 MG/1
1 TABLET, FILM COATED ORAL
Refills: 0 | DISCHARGE

## 2024-06-10 RX ORDER — AMLODIPINE BESYLATE 2.5 MG/1
1 TABLET ORAL
Refills: 0 | DISCHARGE

## 2024-06-10 RX ORDER — ACETAMINOPHEN 500 MG
650 TABLET ORAL EVERY 6 HOURS
Refills: 0 | Status: DISCONTINUED | OUTPATIENT
Start: 2024-06-10 | End: 2024-06-11

## 2024-06-10 RX ADMIN — Medication 260 MILLIGRAM(S): at 13:00

## 2024-06-10 RX ADMIN — ATORVASTATIN CALCIUM 10 MILLIGRAM(S): 80 TABLET, FILM COATED ORAL at 22:48

## 2024-06-10 RX ADMIN — SODIUM CHLORIDE 75 MILLILITER(S): 9 INJECTION INTRAMUSCULAR; INTRAVENOUS; SUBCUTANEOUS at 06:38

## 2024-06-10 RX ADMIN — Medication 650 MILLIGRAM(S): at 07:37

## 2024-06-10 RX ADMIN — PANTOPRAZOLE SODIUM 40 MILLIGRAM(S): 20 TABLET, DELAYED RELEASE ORAL at 06:41

## 2024-06-10 RX ADMIN — SODIUM CHLORIDE 100 MILLILITER(S): 9 INJECTION INTRAMUSCULAR; INTRAVENOUS; SUBCUTANEOUS at 09:23

## 2024-06-10 RX ADMIN — Medication 260 MILLIGRAM(S): at 06:41

## 2024-06-10 RX ADMIN — ESCITALOPRAM OXALATE 10 MILLIGRAM(S): 10 TABLET, FILM COATED ORAL at 11:18

## 2024-06-10 RX ADMIN — CHLORHEXIDINE GLUCONATE 1 APPLICATION(S): 213 SOLUTION TOPICAL at 22:30

## 2024-06-10 RX ADMIN — FAMOTIDINE 20 MILLIGRAM(S): 10 INJECTION INTRAVENOUS at 06:41

## 2024-06-10 RX ADMIN — Medication 650 MILLIGRAM(S): at 13:48

## 2024-06-10 NOTE — H&P ADULT - ATTENDING COMMENTS
Seen and examined.  Details as above 'trauma consult  GLF with traumatic subarachnoid.    ABCD intact  HD normal  GCS 15 MALONEY  CT sylvian traumatic subarachnoid r>l    A/P  Traumatic subarachnoid hemorrhage after ground level fall  Admit to trauma  Repeat CT in 4 -6 hrs  serial neuro exams   DVT chemoprophylaxes to start 24 hrs after stable CT, SCD for now  Repeat  CT stable , CTA no vascular abnormalities.

## 2024-06-10 NOTE — PATIENT PROFILE ADULT - FALL HARM RISK - RISK INTERVENTIONS

## 2024-06-10 NOTE — PROGRESS NOTE ADULT - ASSESSMENT
Neuro: Repeat CT head  Cardiovascular: Remains w/adequate MAP although SBP mildly soft  Pulmonary: Breathing comfortably on RA  Heme: H/H adequate, scds, chemical prophylaxis currently on hold until stable head ct  GI: Tolerating diet  : DTV, cr adequate  ID: Afebrile, WBC normal  Fluids: No ivf  Endocrine/Electrolytes: Lytes adequate  Skin:   Lines: PIV  Code Status: Full code  f/u elbow films  Pelvis films negative       79 year old woman w/hx of HTN and HLD who was admitted 6/10/24 after she tripped on the leg of a chair and fell with head-strike on wooden floor.  Found to have a traumatic subarachnoid hemorrhage.  Left eyebrow laceration.  L periorbital ecchymosis.  L elbow/forearm ecchymosis.    Neuro: Repeat CT head at 1500 (3rd scan), continue w/q1 hour neuro checks  Cardiovascular: Remains w/adequate MAP although SBP mildly soft this am, home antihypertensives currently held  Pulmonary: Breathing comfortably on RA, OOB to chair  Heme: H/H adequate, scds, chemical prophylaxis currently on hold until stable head ct  GI: Tolerating diet, home protonix  : DTV, cr adequate  ID: Afebrile, WBC normal, no antibiotics  Fluids: fluids given earlier in setting of mildly low SBP, HLIV if taking adequate PO  Endocrine/Electrolytes: Lytes adequate  Lines: PIV  Code Status: Full code  f/u elbow films -appears grossly negative  Pelvis films negative -OK for OOB  Tertiary exam  Geriatric eval  Had goals of care discussion earlier this am.       79 year old woman w/hx of HTN and HLD who was admitted 6/10/24 after she tripped on the leg of a chair and fell with head-strike on wooden floor.  Found to have a traumatic subarachnoid hemorrhage.  Left eyebrow laceration.  L periorbital ecchymosis.  L elbow/forearm ecchymosis.    Neuro: Repeat CT head at 1500 (3rd scan), continue w/q1 hour neuro checks  Cardiovascular: Remains w/adequate MAP although SBP mildly soft this am, home antihypertensives currently held  Pulmonary: Breathing comfortably on RA, OOB to chair  Heme: H/H adequate, scds, chemical prophylaxis currently on hold until stable head ct  GI: Tolerating diet, home protonix  : DTV, cr adequate  ID: Afebrile, WBC normal, no antibiotics  Fluids: fluids given earlier in setting of mildly low SBP, HLIV if taking adequate PO  Endocrine/Electrolytes: Lytes adequate  Lines: PIV  Code Status: Full code  f/u elbow films -appears grossly negative  Pelvis films negative -OK for OOB  Tertiary exam  Geriatric eval  See goals of care discussion earlier this am.

## 2024-06-10 NOTE — H&P ADULT - HISTORY OF PRESENT ILLNESS
79y female w/ pmh of HTN, HLD presenting after a mechanical ground level fall occurring 30 min PTA. Pt reported tripping and falling, + head strike on wooden floor, denies LOC, dizziness, lightheadedness, chest pain or SOB that may  have contributed. denies neck pain, back pain, hip pain, or lower extremity pain. denies weakness, numbness. Patient takes daily asa. Patietn able to stand and ambulate with assistance after the fall.     A: Intact, speaking in full sentences  B: Bilateral breath sounds  C: Palpable central and peripheral pulses, no evidence of active bleeding.  D: GCS 15, no evident deficits  E: Patient exposed, no other evident injuries identified

## 2024-06-10 NOTE — H&P ADULT - ASSESSMENT
80 yo F s/p mechanical fall sustaining a tSAH.  Neuro intact, HDN normal, on Asa    Plan:  Admit to trauma  Pain control  CTA head/neck  Repeat CT in 6 and 24 hours from original one  Holding asa and chemo DVT ppx  OOB   F/U NSx 80 yo F s/p mechanical fall sustaining a tSAH.  Neuro intact, HDN normal, on Asa    Plan:  Admit to trauma  Pain control  CTA head/neck  Repeat CT in 4- 6 hrs  Holding asa and chemo DVT ppx  OOB   F/U NSx

## 2024-06-10 NOTE — PROGRESS NOTE ADULT - ASSESSMENT
INCOMPLETE NOTE 79-year-old female with PMH of HTN, HLD presenting after a mechanical ground level fall. Pt reported tripping and falling, + head strike on wooden floor, denies LOC, dizziness, lightheadedness, chest pain or SOB that may have contributed. Denies neck pain, back pain, hip pain, or lower extremity pain. denies weakness, numbness. Patient takes daily ASA 81mg.    Plan:  - Q1h neuro checks until stable scan  - CTH imaging reviewed- the repeat CTH from this morning read as new areas of SAH in left frontal and right parietal  - Please obtain repeat 6h CTH around 4pm  - Patient will also need 24h stability CTH after stable CTH  - Keppra 250mg BID for seizure ppx  - Pain control PRN, avoid oversedation  - SBP goal normotensive <160  - Continue to hold ASA 81mg until cleared to restart outpatient  - SCDs for DVT ppx, hold chemoprophylaxis for now  - Discussed with Dr. Bolden

## 2024-06-10 NOTE — GOALS OF CARE CONVERSATION - ADVANCED CARE PLANNING - CONVERSATION DETAILS
Patient states she would like to receive CPR / intubation if the unlikely chance she has cardiac arrest or respiratory failure

## 2024-06-10 NOTE — PROGRESS NOTE ADULT - SUBJECTIVE AND OBJECTIVE BOX
HPI:  79-year-old female w/ PMH of HTN, HLD presenting after a mechanical ground level fall occurring 30 min PTA. Pt reported tripping and falling, + head strike on wooden floor, denies LOC, dizziness, lightheadedness, chest pain or SOB that may  have contributed. denies neck pain, back pain, hip pain, or lower extremity pain. denies weakness, numbness. Patient takes daily asa. Patietn able to stand and ambulate with assistance after the fall.     A: Intact, speaking in full sentences  B: Bilateral breath sounds  C: Palpable central and peripheral pulses, no evidence of active bleeding.  D: GCS 15, no evident deficits  E: Patient exposed, no other evident injuries identified    (10 Manish 2024 02:25)    INTERVAL HPI/OVERNIGHT EVENTS:  79y Female s/p __ seen lying comfortably in bed. Tolerating diet. Passing gas/BM. Voiding. Bass in with __ clear urine. Denies headache, weakness, numbness, n/v/d, fevers, chills, chest pain, SOB.     Vital Signs Last 24 Hrs  T(C): 37.1 (10 Manish 2024 07:26), Max: 37.2 (10 Manish 2024 03:58)  T(F): 98.7 (10 Manish 2024 07:26), Max: 98.9 (10 Manish 2024 03:58)  HR: 81 (10 Manish 2024 10:00) (76 - 103)  BP: 117/66 (10 Manish 2024 10:00) (91/57 - 158/76)  BP(mean): 82 (10 Manish 2024 10:00) (69 - 92)  RR: 21 (10 Manish 2024 10:00) (16 - 21)  SpO2: 96% (10 Manish 2024 10:00) (94% - 98%)  Parameters below as of 10 Manish 2024 10:00  Patient On (Oxygen Delivery Method): room air    PHYSICAL EXAM:  GENERAL: NAD, well-groomed  HEAD: Atraumatic, normocephalic  DRAINS:   WOUND: Dressing clean dry intact  ALTON COMA SCORE: E- V- M- =       E: 4= opens eyes spontaneously 3= to voice 2= to noxious 1= no opening       V: 5= oriented 4= confused 3= inappropriate words 2= incomprehensible sounds 1= nonverbal 1T= intubated       M: 6= follows commands 5= localizes 4= withdraws 3= flexor posturing 2= extensor posturing 1= no movement  MENTAL STATUS: AAO x3; Awake/Comatose; Opens eyes spontaneously/to voice/to light touch/to noxious stimuli; Appropriately conversant without aphasia/Nonverbal; following simple commands/mimicking/not following commands  CRANIAL NERVES: Visual acuity normal for age, visual fields full to confrontation, PERRL. EOMI without nystagmus. Facial sensation intact V1-3 distribution b/l. Face symmetric w/ normal eye closure and smile, tongue midline. Hearing grossly intact. Speech clear. Head turning and shoulder shrug intact.   REFLEXES: PERRL. Corneals intact b/l. Gag intact. Cough intact. Oculocephalic reflex intact (Doll's eye). Negative Barker's b/l. Negative clonus b/l  MOTOR: strength 5/5 b/l upper and lower extremities  Uppers     Delt (C5/6)     Bicep (C5/6)     Wrist Extend (C6)     Tricep (C7)     HG (C8/T1)  R                     5/5                 5/5                         5/5                           5/5                   5/5  L                      5/5                 5/5                         5/5                           5/5                   5/5  Lowers      HF(L1/L2)     KE (L3)     DF (L4)     EHL (L5)     PF (S1)      R                     5/5              5/5           5/5           5/5            5/5  L                     5/5               5/5          5/5            5/5            5/5  SENSATION: grossly intact to light touch all extremities  COORDINATION: Gait intact; rapid alternating movements intact; heel to shin intact; no upper extremity dysmetria  CHEST/LUNG: Clear to auscultation bilaterally; no rales, rhonchi, wheezing, or rubs  HEART: +S1/+S2; Regular rate and rhythm; no murmurs, rubs, or gallops  ABDOMEN: Soft, nontender, nondistended; bowel sounds present all four quadrants  EXTREMITIES:  2+ peripheral pulses, no clubbing, cyanosis, or edema  SKIN: Warm, dry; no rashes or lesions    LABS:                        13.3   7.77  )-----------( 206      ( 10 Manish 2024 01:18 )             41.8     06-10    142  |  102  |  9.6  ----------------------------<  110<H>  3.8   |  23.0  |  0.26<L>    Ca    9.6      10 Manish 2024 01:18    TPro  7.4  /  Alb  4.7  /  TBili  0.4  /  DBili  x   /  AST  38<H>  /  ALT  26  /  AlkPhos  61  06-10    PT/INR - ( 10 Manish 2024 01:18 )   PT: 11.7 sec;   INR: 1.06 ratio    PTT - ( 10 Manish 2024 01:18 )  PTT:31.5 sec    Urinalysis Basic - ( 10 Manish 2024 01:18 )  Color: x / Appearance: x / SG: x / pH: x  Gluc: 110 mg/dL / Ketone: x  / Bili: x / Urobili: x   Blood: x / Protein: x / Nitrite: x   Leuk Esterase: x / RBC: x / WBC x   Sq Epi: x / Non Sq Epi: x / Bacteria: x    06-09 @ 07:01  -  06-10 @ 07:00  --------------------------------------------------------  IN: 75 mL / OUT: 0 mL / NET: 75 mL    06-10 @ 07:01  -  06-10 @ 10:21  --------------------------------------------------------  IN: 75 mL / OUT: 0 mL / NET: 75 mL    RADIOLOGY & ADDITIONAL TESTS:       HPI:  80 yo female w/ pmh of HTN, HLD presenting after a mechanical ground level fall occurring 30 min PTA. Pt reported tripping and falling, + head strike on wooden floor, denies LOC, dizziness, lightheadedness, chest pain or SOB that may  have contributed. denies neck pain, back pain, hip pain, or lower extremity pain. denies weakness, numbness. Patient takes daily asa.    INTERVAL HPI/OVERNIGHT EVENTS:  79-year-old female seen sitting up in bed this morning during rounds. Denies any current headache, weakness, numbness, n/v/d, fevers, chills, chest pain, SOB.     Vital Signs Last 24 Hrs  T(C): 37.1 (10 Manish 2024 07:26), Max: 37.2 (10 Manish 2024 03:58)  T(F): 98.7 (10 Manish 2024 07:26), Max: 98.9 (10 Manish 2024 03:58)  HR: 81 (10 Manish 2024 10:00) (76 - 103)  BP: 117/66 (10 Manish 2024 10:00) (91/57 - 158/76)  BP(mean): 82 (10 Manish 2024 10:00) (69 - 92)  RR: 21 (10 Manish 2024 10:00) (16 - 21)  SpO2: 96% (10 Manish 2024 10:00) (94% - 98%)  Parameters below as of 10 Manish 2024 10:00  Patient On (Oxygen Delivery Method): room air    PHYSICAL EXAM:  GENERAL: NAD, well-groomed  HEAD: +left forehead laceration  MENTAL STATUS: AAOx3; awake; opens eyes spontaneously; appropriately conversant without aphasia; following simple commands  CRANIAL NERVES: PERRL. EOMI. Facial sensation intact V1-3 distribution b/l. Face grossly symmetric, tongue midline. Hearing grossly intact. Speech clear.   MOTOR: Strength 5/5 b/l upper and lower extremities, no drift  SENSATION: Grossly intact to light touch all extremities  CHEST/LUNG: Non-labored breathing on room-air  ABDOMEN: Soft, nontender, nondistended  SKIN: Warm, dry    LABS:                        13.3   7.77  )-----------( 206      ( 10 Manish 2024 01:18 )             41.8     06-10    142  |  102  |  9.6  ----------------------------<  110<H>  3.8   |  23.0  |  0.26<L>    Ca    9.6      10 Manish 2024 01:18    TPro  7.4  /  Alb  4.7  /  TBili  0.4  /  DBili  x   /  AST  38<H>  /  ALT  26  /  AlkPhos  61  06-10    PT/INR - ( 10 Manish 2024 01:18 )   PT: 11.7 sec;   INR: 1.06 ratio    PTT - ( 10 Manish 2024 01:18 )  PTT:31.5 sec    Urinalysis Basic - ( 10 Manish 2024 01:18 )  Color: x / Appearance: x / SG: x / pH: x  Gluc: 110 mg/dL / Ketone: x  / Bili: x / Urobili: x   Blood: x / Protein: x / Nitrite: x   Leuk Esterase: x / RBC: x / WBC x   Sq Epi: x / Non Sq Epi: x / Bacteria: x    06-09 @ 07:01  -  06-10 @ 07:00  --------------------------------------------------------  IN: 75 mL / OUT: 0 mL / NET: 75 mL    06-10 @ 07:01  -  06-10 @ 10:21  --------------------------------------------------------  IN: 75 mL / OUT: 0 mL / NET: 75 mL    RADIOLOGY & ADDITIONAL TESTS:    CT Head No Cont (06.10.24 @ 09:53)  Acute subarachnoid hemorrhage involving the right sylvian fissure is   again seen. There is also evidence of a new focus of subarachnoid   hemorrhage involving the left frontal region as well as subtle area of   acute subarachnoid hemorrhage involving the right parietal region.    No significant shift or herniation seen.    Evaluation of the osseous structures with the appropriate window appears normal    The visualized paranasal sinuses mastoid and middle ear regions appear clear.    Impression: Subarachnoid hemorrhage is again seen with new areas identified as well.    CT Angio Head w/ IV Cont (06.10.24 @ 03:18)  IMPRESSION:  NoncontrastCT Head: Bilateral sylvian fissure subarachnoid hemorrhage,   slightly more conspicuous on the left since most recent examination. No   midline shift or hydrocephalus.    CTA Neck: No significant flow-limiting stenosis or evidence of acute   dissection within the cervical carotid or vertebral arteries.    CTA Head: No proximal large vessel occlusion, significant stenosis, or   evidence of intracranial aneurysm.    CT Head No Cont (06.10.24 @ 00:47)  IMPRESSION:  Acute right sylvian fissure subarachnoid hemorrhage. No calvarial fracture.

## 2024-06-10 NOTE — ED ADULT NURSE REASSESSMENT NOTE - NS ED NURSE REASSESS COMMENT FT1
received report from David ALFARO, assumed care of pt at 0100.  pt moved to  for brain bleed, pt is AOX4.  neuro consult

## 2024-06-10 NOTE — PROGRESS NOTE ADULT - SUBJECTIVE AND OBJECTIVE BOX
INTERVAL HPI/OVERNIGHT EVENTS:  Repeat CT overnight worsened.  Repeat this am at 0900 still further worsened.  CTA head and neck negative for BCVI.    SUBJECTIVE:      PAST MEDICAL & SURGICAL HISTORY:  Osteoporosis      High blood pressure      Hyperlipidemia, unspecified hyperlipidemia      S/P hip replacement          ICU Vital Signs Last 24 Hrs  T(C): 36.9 (10 Manish 2024 11:05), Max: 37.2 (10 Manish 2024 03:58)  T(F): 98.4 (10 Manish 2024 11:05), Max: 98.9 (10 Manish 2024 03:58)  HR: 81 (10 Manish 2024 10:00) (76 - 103)  BP: 117/66 (10 Manish 2024 10:00) (91/57 - 158/76)  BP(mean): 82 (10 Manish 2024 10:00) (69 - 92)  ABP: --  ABP(mean): --  RR: 21 (10 Manish 2024 10:00) (16 - 21)  SpO2: 96% (10 Manish 2024 10:00) (94% - 98%)    O2 Parameters below as of 10 Manish 2024 10:00  Patient On (Oxygen Delivery Method): room air          I&O's Detail    09 Jun 2024 07:01  -  10 Manish 2024 07:00  --------------------------------------------------------  IN:    sodium chloride 0.9%: 75 mL  Total IN: 75 mL    OUT:  Total OUT: 0 mL    Total NET: 75 mL      10 Manish 2024 07:01  -  10 Manish 2024 12:17  --------------------------------------------------------  IN:    sodium chloride 0.9%: 75 mL  Total IN: 75 mL    OUT:  Total OUT: 0 mL    Total NET: 75 mL          MEDICATIONS  (STANDING):  amLODIPine   Tablet 5 milliGRAM(s) Oral daily  atorvastatin 10 milliGRAM(s) Oral at bedtime  chlorhexidine 2% Cloths 1 Application(s) Topical daily  enalapril 10 milliGRAM(s) Oral daily  escitalopram 10 milliGRAM(s) Oral daily  famotidine    Tablet 20 milliGRAM(s) Oral two times a day  lidocaine 1%/epinephrine 1:100,000 Inj 10 milliLiter(s) Local Injection Once  pantoprazole    Tablet 40 milliGRAM(s) Oral before breakfast  sodium chloride 0.9%. 1000 milliLiter(s) (100 mL/Hr) IV Continuous <Continuous>    MEDICATIONS  (PRN):  acetaminophen   IVPB .. 650 milliGRAM(s) IV Intermittent every 6 hours PRN Mild Pain (1 - 3), Moderate Pain (4 - 6), Severe Pain (7 - 10)    Drug Dosing Weight  Height (cm): 157.5 (10 Manish 2024 03:58)  Weight (kg): 37.4 (10 Manish 2024 03:58)  BMI (kg/m2): 15.1 (10 Manish 2024 03:58)  BSA (m2): 1.31 (10 Manish 2024 03:58)    LABS:    CBC Full  -  ( 10 Manish 2024 01:18 )  WBC Count : 7.77 K/uL  RBC Count : 4.94 M/uL  Hemoglobin : 13.3 g/dL  Hematocrit : 41.8 %  Platelet Count - Automated : 206 K/uL  Mean Cell Volume : 84.6 fl  Mean Cell Hemoglobin : 26.9 pg  Mean Cell Hemoglobin Concentration : 31.8 gm/dL  Auto Neutrophil # : 5.18 K/uL  Auto Lymphocyte # : 1.43 K/uL  Auto Monocyte # : 0.68 K/uL  Auto Eosinophil # : 0.39 K/uL  Auto Basophil # : 0.06 K/uL  Auto Neutrophil % : 66.6 %  Auto Lymphocyte % : 18.4 %  Auto Monocyte % : 8.8 %  Auto Eosinophil % : 5.0 %  Auto Basophil % : 0.8 %    06-10    142  |  102  |  9.6  ----------------------------<  110<H>  3.8   |  23.0  |  0.26<L>    Ca    9.6      10 Manish 2024 01:18    TPro  7.4  /  Alb  4.7  /  TBili  0.4  /  DBili  x   /  AST  38<H>  /  ALT  26  /  AlkPhos  61  06-10    PT/INR - ( 10 Manish 2024 01:18 )   PT: 11.7 sec;   INR: 1.06 ratio         PTT - ( 10 Manish 2024 01:18 )  PTT:31.5 sec  Urinalysis Basic - ( 10 Manish 2024 01:18 )    Color: x / Appearance: x / SG: x / pH: x  Gluc: 110 mg/dL / Ketone: x  / Bili: x / Urobili: x   Blood: x / Protein: x / Nitrite: x   Leuk Esterase: x / RBC: x / WBC x   Sq Epi: x / Non Sq Epi: x / Bacteria: x        Physical Exam:  GENERAL:  NEUROLOGIC:  HEENT:  NECK:  CHEST/LUNG:  HEART:  ABDOMEN:  EXTREMITIES:  SKIN:    PATIENT CARE ACCESS DEVICES:  [ ] Peripheral IV  [ ] Central Venous Line	[ ] R	[ ] L	[ ] IJ	[ ] Fem	[ ] SC	   [ ] Arterial Line		[ ] R	[ ] L	[ ] Fem	[ ] Rad	[ ] Ax	   [ ] PICC:					[ ] Mediport  [ ] Urinary Catheter:   [ ] Necessity of urinary, arterial, and venous catheters discussed           INTERVAL HPI/OVERNIGHT EVENTS:  Repeat CT overnight worsened.  Repeat this am at 0900 still further worsened.  CTA head and neck negative for BCVI.    SUBJECTIVE:  Reports ongoing HA which has improved from overnight.    PAST MEDICAL & SURGICAL HISTORY:  Osteoporosis      High blood pressure      Hyperlipidemia, unspecified hyperlipidemia      S/P hip replacement          ICU Vital Signs Last 24 Hrs  T(C): 36.9 (10 Manish 2024 11:05), Max: 37.2 (10 Manish 2024 03:58)  T(F): 98.4 (10 Manish 2024 11:05), Max: 98.9 (10 Manish 2024 03:58)  HR: 81 (10 Manish 2024 10:00) (76 - 103)  BP: 117/66 (10 Manish 2024 10:00) (91/57 - 158/76)  BP(mean): 82 (10 Manish 2024 10:00) (69 - 92)  ABP: --  ABP(mean): --  RR: 21 (10 Manish 2024 10:00) (16 - 21)  SpO2: 96% (10 Manish 2024 10:00) (94% - 98%)    O2 Parameters below as of 10 Manish 2024 10:00  Patient On (Oxygen Delivery Method): room air          I&O's Detail    09 Jun 2024 07:01  -  10 Manish 2024 07:00  --------------------------------------------------------  IN:    sodium chloride 0.9%: 75 mL  Total IN: 75 mL    OUT:  Total OUT: 0 mL    Total NET: 75 mL      10 Manish 2024 07:01  -  10 Manish 2024 12:17  --------------------------------------------------------  IN:    sodium chloride 0.9%: 75 mL  Total IN: 75 mL    OUT:  Total OUT: 0 mL    Total NET: 75 mL          MEDICATIONS  (STANDING):  amLODIPine   Tablet 5 milliGRAM(s) Oral daily  atorvastatin 10 milliGRAM(s) Oral at bedtime  chlorhexidine 2% Cloths 1 Application(s) Topical daily  enalapril 10 milliGRAM(s) Oral daily  escitalopram 10 milliGRAM(s) Oral daily  famotidine    Tablet 20 milliGRAM(s) Oral two times a day  lidocaine 1%/epinephrine 1:100,000 Inj 10 milliLiter(s) Local Injection Once  pantoprazole    Tablet 40 milliGRAM(s) Oral before breakfast  sodium chloride 0.9%. 1000 milliLiter(s) (100 mL/Hr) IV Continuous <Continuous>    MEDICATIONS  (PRN):  acetaminophen   IVPB .. 650 milliGRAM(s) IV Intermittent every 6 hours PRN Mild Pain (1 - 3), Moderate Pain (4 - 6), Severe Pain (7 - 10)    Drug Dosing Weight  Height (cm): 157.5 (10 Manish 2024 03:58)  Weight (kg): 37.4 (10 Manish 2024 03:58)  BMI (kg/m2): 15.1 (10 Manish 2024 03:58)  BSA (m2): 1.31 (10 Manish 2024 03:58)    LABS:    CBC Full  -  ( 10 Manish 2024 01:18 )  WBC Count : 7.77 K/uL  RBC Count : 4.94 M/uL  Hemoglobin : 13.3 g/dL  Hematocrit : 41.8 %  Platelet Count - Automated : 206 K/uL  Mean Cell Volume : 84.6 fl  Mean Cell Hemoglobin : 26.9 pg  Mean Cell Hemoglobin Concentration : 31.8 gm/dL  Auto Neutrophil # : 5.18 K/uL  Auto Lymphocyte # : 1.43 K/uL  Auto Monocyte # : 0.68 K/uL  Auto Eosinophil # : 0.39 K/uL  Auto Basophil # : 0.06 K/uL  Auto Neutrophil % : 66.6 %  Auto Lymphocyte % : 18.4 %  Auto Monocyte % : 8.8 %  Auto Eosinophil % : 5.0 %  Auto Basophil % : 0.8 %    06-10    142  |  102  |  9.6  ----------------------------<  110<H>  3.8   |  23.0  |  0.26<L>    Ca    9.6      10 Manish 2024 01:18    TPro  7.4  /  Alb  4.7  /  TBili  0.4  /  DBili  x   /  AST  38<H>  /  ALT  26  /  AlkPhos  61  06-10    PT/INR - ( 10 Manish 2024 01:18 )   PT: 11.7 sec;   INR: 1.06 ratio         PTT - ( 10 Manish 2024 01:18 )  PTT:31.5 sec  Urinalysis Basic - ( 10 Manish 2024 01:18 )    Color: x / Appearance: x / SG: x / pH: x  Gluc: 110 mg/dL / Ketone: x  / Bili: x / Urobili: x   Blood: x / Protein: x / Nitrite: x   Leuk Esterase: x / RBC: x / WBC x   Sq Epi: x / Non Sq Epi: x / Bacteria: x        Physical Exam:  GENERAL: Sitting comfortably in hospital bed eating lunch, very, very thin (patient report that she is generally very thin but may be thinner than prior as she is 3 months after revision left knee replacement)  NEUROLOGIC: Alert, oriented x person, place and month.  Oriented to situation.  Conversant and comfortable  HEENT: Left eyebrow laceration -sutured and clean, lateral/inferior left periorbital ecchymosis  NECK: Non tender  CHEST/LUNG: Clear b/l  HEART: RRR  ABDOMEN: Soft, non tender, non distended, well healed lower midline incisions  EXTREMITIES: Warm, no edema, 2+DP pulses b/l, b/l knee replacement incisions, left knee incision well healing (newer), right knew incision well healed, b/l hip incisions (previous hip surgery), L elbow/forearm ecchymosis on dorsal aspect - mildly tender but patient w/full range of motion  SKIN: No rashes  BACK:  Non tender, curvature of the spine w/mild kyphosis    PATIENT CARE ACCESS DEVICES:  [x ] Peripheral IV  [ ] Central Venous Line	[ ] R	[ ] L	[ ] IJ	[ ] Fem	[ ] SC	   [ ] Arterial Line		[ ] R	[ ] L	[ ] Fem	[ ] Rad	[ ] Ax	   [ ] PICC:					[ ] Mediport  [ ] Urinary Catheter:   [ ] Necessity of urinary, arterial, and venous catheters discussed

## 2024-06-10 NOTE — CONSULT NOTE ADULT - SUBJECTIVE AND OBJECTIVE BOX
HPI:  79y female w/ pmh of HTN, HLD presenting after a mechanical ground level fall occurring 30 min PTA. Pt reported tripping and falling, + head strike on wooden floor, denies LOC, dizziness, lightheadedness, chest pain or SOB that may  have contributed. denies neck pain, back pain, hip pain, or lower extremity pain. denies weakness, numbness. Patient takes daily asa.      HISTORY OF PRESENT ILLNESS:   79yF PMH     PAST MEDICAL & SURGICAL HISTORY:  Osteoporosis  High blood pressure  Hyperlipidemia, unspecified hyperlipidemia  S/P hip replacement      FAMILY HISTORY:  No pertinent family history      REVIEW OF SYSTEMS  CONSTITUTIONAL: No fever, weight loss, or fatigue  EYES: No eye pain, visual disturbances, or discharge  ENMT:  No difficulty hearing, tinnitus, vertigo; No sinus or throat pain  NECK: No pain or stiffness  RESPIRATORY: No cough, wheezing, chills or hemoptysis; No shortness of breath  CARDIOVASCULAR: No chest pain, palpitations, dizziness, or leg swelling  GASTROINTESTINAL: No abdominal or epigastric pain. No nausea, vomiting, or hematemesis; No diarrhea or constipation. No melena or hematochezia.  GENITOURINARY: No dysuria, frequency, hematuria, or incontinence  NEUROLOGICAL: No headaches, memory loss, loss of strength, numbness, or tremors  SKIN: No itching, burning, rashes, or lesions   LYMPH NODES: No enlarged glands  ENDOCRINE: No heat or cold intolerance; No hair loss  MUSCULOSKELETAL: No joint pain or swelling; No muscle, back, or extremity pain  PSYCHIATRIC: No depression, anxiety, mood swings, or difficulty sleeping  HEME/LYMPH: No easy bruising, or bleeding gums  ALLERY AND IMMUNOLOGIC: No hives or eczema    HOME MEDICATIONS:  Home Medications:  acetaminophen 325 mg oral tablet: 2 tab(s) orally every 6 hours, As needed, Mild Pain (22 Dec 2015 13:53)  acetaminophen-oxyCODONE 325 mg-5 mg oral tablet: 1 tab(s) orally every 4 hours, As needed, Severe Pain (22 Dec 2015 13:53)  aspirin 81 mg oral delayed release tablet: 1 tab(s) orally once a day (22 Dec 2015 13:53)  atorvastatin 10 mg oral tablet: 1 tab(s) orally  (22 Dec 2015 13:53)  docusate sodium 100 mg oral capsule: 1 cap(s) orally 2 times a day (22 Dec 2015 13:53)  enoxaparin: 40 milligram(s) subcutaneous once a day (22 Dec 2015 13:53)      OTHER:  lidocaine 1%/epinephrine 1:100,000 Inj 10 milliLiter(s) Local Injection Once      Vital Signs Last 24 Hrs  T(C): 36.7 (09 Jun 2024 23:49), Max: 36.8 (09 Jun 2024 18:15)  T(F): 98 (09 Jun 2024 23:49), Max: 98.2 (09 Jun 2024 18:15)  HR: 99 (09 Jun 2024 23:49) (99 - 103)  BP: 115/66 (09 Jun 2024 23:49) (115/66 - 158/76)  BP(mean): --  RR: 19 (09 Jun 2024 23:49) (19 - 20)  SpO2: 98% (09 Jun 2024 23:49) (98% - 98%)    Parameters below as of 09 Jun 2024 18:15  Patient On (Oxygen Delivery Method): room air      PHYSICAL EXAM:  GENERAL: NAD, well-groomed  HEAD:  +Lac over left eyebrow  EYES: Conjunctiva and sclera clear  ENMT: No tonsillar erythema, exudates, or enlargement; moist mucous membranes  NECK: Supple, no JVD, normal thyroid  ALTON COMA SCORE: E-4 V-5 M-6 =15       E: 4= opens eyes spontaneously 3= to voice 2= to noxious 1= no opening       V: 5= oriented 4= confused 3= inappropriate words 2= incomprehensible sounds 1= nonverbal 1T= intubated       M: 6= follows commands 5= localizes 4= withdraws 3= flexor posturing 2= extensor posturing 1= no movement  MENTAL STATUS: AAO x3; Awake; Opens eyes spontaneously; Appropriately conversant without aphasia; following simple commands  CRANIAL NERVES: ANTIONETTE. EOMI without nystagmus. Facial sensation intact V1-3 distribution b/l. Face symmetric w/ normal eye closure and smile, tongue midline.  REFLEXES: negative Barker's b/l; negative clonus b/l; no upper extremity dysmetria  MOTOR: strength 5/5 b/l upper and lower extremities  Uppers     Delt     Bicep     Tricep     HG  R                5/5       5/5         5/5         5/5  L                5/5       5/5         5/5         5/5  Lowers      HF     KF      KE     PF     DF     EHL  R             5/5     5/5     5/5    5/5   5/5    5/5  L              5/5    5/5      5/5    5/5   5/5    5/5  SENSATION: grossly intact to light touch all extremities  ABDOMEN: Soft, nontender, nondistended; bowel sounds present   EXTREMITIES:  2+ peripheral pulses, no clubbing, cyanosis, or edema  LYMPH: No lymphadenopathy noted  SKIN: Warm, dry; no rashes or lesions      RADIOLOGY & ADDITIONAL STUDIES:    ACC: [id number] EXAM:  CT BRAIN     PROCEDURE DATE:  06/10/2024    COMPARISON: CT head of 3/24/2023  PROCEDURE:  Noncontrast CT of the Head was performed from the skull base to the   vertex. Coronal and Sagittal reformats were obtained.  IMPRESSION:  Acute right sylvian fissure subarachnoid hemorrhage. No calvarial   fracture.

## 2024-06-10 NOTE — CONSULT NOTE ADULT - ASSESSMENT
79y female w/ pmh of HTN, HLD presenting after a mechanical ground level fall occurring 30 min PTA. Pt reported tripping and falling, + head strike on wooden floor, denies LOC, dizziness, lightheadedness, chest pain or SOB that may  have contributed. denies neck pain, back pain, hip pain, or lower extremity pain. denies weakness, numbness. Patient takes daily asa.      -Images reviewed  -recommend trauma evaluation  -recommend CTA head and neck  -recommend repeat CT brain in 24hrs  -Q1hr neuro checks  -pain control if needed, avoid over sedation  -SCDs for DVT prophylaxis  -pain control as needed, avoid over sedation  -Goal SBP 90 to 140  -will continue to follow  -Discussed w/ Dr. Bolden 79y female w/ pmh of HTN, HLD presenting after a mechanical ground level fall occurring 30 min PTA. Pt reported tripping and falling, + head strike on wooden floor, denies LOC, dizziness, lightheadedness, chest pain or SOB that may  have contributed. denies neck pain, back pain, hip pain, or lower extremity pain. denies weakness, numbness. Patient takes daily asa.      -Images reviewed  -recommend trauma evaluation  -recommend CTA head and neck  -recommend repeat CT brain in 6 hours and 24hrs  -Q1hr neuro checks  -keppra 250 BID x 7 days  -pain control if needed, avoid over sedation  -SCDs for DVT prophylaxis  -pain control as needed, avoid over sedation  -Goal SBP 90 to 140  -will continue to follow  -Discussed w/ Dr. Bolden 79y female w/ pmh of HTN, HLD presenting after a mechanical ground level fall occurring 30 min PTA. Pt reported tripping and falling, + head strike on wooden floor, denies LOC, dizziness, lightheadedness, chest pain or SOB that may  have contributed. denies neck pain, back pain, hip pain, or lower extremity pain. denies weakness, numbness. Patient takes daily asa.    -Images reviewed  -recommend trauma evaluation  -recommend CTA head and neck  -recommend repeat CT brain in 6 hours and 24hrs  -Q1hr neuro checks  -keppra 250 BID x 7 days  -pain control if needed, avoid over sedation  -SCDs for DVT prophylaxis  -pain control as needed, avoid over sedation  -Goal SBP 90 to 140  -hold aspirin, no chemical DVT ppx  -will continue to follow  -Discussed w/ Dr. Bolden

## 2024-06-10 NOTE — H&P ADULT - NSHPPHYSICALEXAM_GEN_ALL_CORE
PHYSICAL EXAM:   General: NAD, Lying in bed comfortably  Neuro: A+Ox3  HEENT: L eyebrow laceration with sutures in place, no active bleeding, EOMI  Neck: Soft, supple  Cardio: RRR, nml S1/S2  Resp: Good effort, CTA b/l  Thorax: No chest wall tenderness  GI/Abd: Soft, NT/ND, no rebound/guarding, no masses palpated  Vascular: All 4 extremities warm.  Skin: Intact, no breakdown  Pelvis: stable  Musculoskeletal: All 4 extremities moving spontaneously, no limitations, no spinal tenderness or stepoffs. L elbow tenderness.

## 2024-06-11 ENCOUNTER — TRANSCRIPTION ENCOUNTER (OUTPATIENT)
Age: 79
End: 2024-06-11

## 2024-06-11 LAB
ANION GAP SERPL CALC-SCNC: 10 MMOL/L — SIGNIFICANT CHANGE UP (ref 5–17)
BASOPHILS # BLD AUTO: 0.03 K/UL — SIGNIFICANT CHANGE UP (ref 0–0.2)
BASOPHILS NFR BLD AUTO: 0.6 % — SIGNIFICANT CHANGE UP (ref 0–2)
BUN SERPL-MCNC: 21.7 MG/DL — HIGH (ref 8–20)
CALCIUM SERPL-MCNC: 9.1 MG/DL — SIGNIFICANT CHANGE UP (ref 8.4–10.5)
CHLORIDE SERPL-SCNC: 108 MMOL/L — SIGNIFICANT CHANGE UP (ref 96–108)
CO2 SERPL-SCNC: 25 MMOL/L — SIGNIFICANT CHANGE UP (ref 22–29)
CREAT SERPL-MCNC: 0.24 MG/DL — LOW (ref 0.5–1.3)
EGFR: 115 ML/MIN/1.73M2 — SIGNIFICANT CHANGE UP
EOSINOPHIL # BLD AUTO: 0.51 K/UL — HIGH (ref 0–0.5)
EOSINOPHIL NFR BLD AUTO: 9.6 % — HIGH (ref 0–6)
GLUCOSE SERPL-MCNC: 111 MG/DL — HIGH (ref 70–99)
HCT VFR BLD CALC: 35.4 % — SIGNIFICANT CHANGE UP (ref 34.5–45)
HGB BLD-MCNC: 11.2 G/DL — LOW (ref 11.5–15.5)
IMM GRANULOCYTES NFR BLD AUTO: 0.2 % — SIGNIFICANT CHANGE UP (ref 0–0.9)
LYMPHOCYTES # BLD AUTO: 1.43 K/UL — SIGNIFICANT CHANGE UP (ref 1–3.3)
LYMPHOCYTES # BLD AUTO: 26.8 % — SIGNIFICANT CHANGE UP (ref 13–44)
MAGNESIUM SERPL-MCNC: 2.1 MG/DL — SIGNIFICANT CHANGE UP (ref 1.6–2.6)
MCHC RBC-ENTMCNC: 26.9 PG — LOW (ref 27–34)
MCHC RBC-ENTMCNC: 31.6 GM/DL — LOW (ref 32–36)
MCV RBC AUTO: 84.9 FL — SIGNIFICANT CHANGE UP (ref 80–100)
MONOCYTES # BLD AUTO: 0.65 K/UL — SIGNIFICANT CHANGE UP (ref 0–0.9)
MONOCYTES NFR BLD AUTO: 12.2 % — SIGNIFICANT CHANGE UP (ref 2–14)
NEUTROPHILS # BLD AUTO: 2.7 K/UL — SIGNIFICANT CHANGE UP (ref 1.8–7.4)
NEUTROPHILS NFR BLD AUTO: 50.6 % — SIGNIFICANT CHANGE UP (ref 43–77)
PHOSPHATE SERPL-MCNC: 3.5 MG/DL — SIGNIFICANT CHANGE UP (ref 2.4–4.7)
PLATELET # BLD AUTO: 169 K/UL — SIGNIFICANT CHANGE UP (ref 150–400)
POTASSIUM SERPL-MCNC: 3.6 MMOL/L — SIGNIFICANT CHANGE UP (ref 3.5–5.3)
POTASSIUM SERPL-SCNC: 3.6 MMOL/L — SIGNIFICANT CHANGE UP (ref 3.5–5.3)
RBC # BLD: 4.17 M/UL — SIGNIFICANT CHANGE UP (ref 3.8–5.2)
RBC # FLD: 15.4 % — HIGH (ref 10.3–14.5)
SODIUM SERPL-SCNC: 143 MMOL/L — SIGNIFICANT CHANGE UP (ref 135–145)
WBC # BLD: 5.33 K/UL — SIGNIFICANT CHANGE UP (ref 3.8–10.5)
WBC # FLD AUTO: 5.33 K/UL — SIGNIFICANT CHANGE UP (ref 3.8–10.5)

## 2024-06-11 PROCEDURE — 70450 CT HEAD/BRAIN W/O DYE: CPT | Mod: 26,77

## 2024-06-11 PROCEDURE — 70450 CT HEAD/BRAIN W/O DYE: CPT | Mod: 26

## 2024-06-11 PROCEDURE — 99233 SBSQ HOSP IP/OBS HIGH 50: CPT

## 2024-06-11 PROCEDURE — 99232 SBSQ HOSP IP/OBS MODERATE 35: CPT | Mod: FS

## 2024-06-11 PROCEDURE — 73200 CT UPPER EXTREMITY W/O DYE: CPT | Mod: 26,LT

## 2024-06-11 RX ORDER — ACETAMINOPHEN 500 MG
650 TABLET ORAL EVERY 6 HOURS
Refills: 0 | Status: DISCONTINUED | OUTPATIENT
Start: 2024-06-11 | End: 2024-06-12

## 2024-06-11 RX ORDER — POTASSIUM CHLORIDE 20 MEQ
40 PACKET (EA) ORAL ONCE
Refills: 0 | Status: COMPLETED | OUTPATIENT
Start: 2024-06-11 | End: 2024-06-11

## 2024-06-11 RX ADMIN — Medication 40 MILLIEQUIVALENT(S): at 10:32

## 2024-06-11 RX ADMIN — Medication 650 MILLIGRAM(S): at 23:58

## 2024-06-11 RX ADMIN — FAMOTIDINE 20 MILLIGRAM(S): 10 INJECTION INTRAVENOUS at 18:14

## 2024-06-11 RX ADMIN — FAMOTIDINE 20 MILLIGRAM(S): 10 INJECTION INTRAVENOUS at 05:32

## 2024-06-11 RX ADMIN — Medication 260 MILLIGRAM(S): at 10:36

## 2024-06-11 RX ADMIN — ESCITALOPRAM OXALATE 10 MILLIGRAM(S): 10 TABLET, FILM COATED ORAL at 12:23

## 2024-06-11 RX ADMIN — ATORVASTATIN CALCIUM 10 MILLIGRAM(S): 80 TABLET, FILM COATED ORAL at 21:19

## 2024-06-11 RX ADMIN — CHLORHEXIDINE GLUCONATE 1 APPLICATION(S): 213 SOLUTION TOPICAL at 21:19

## 2024-06-11 RX ADMIN — Medication 650 MILLIGRAM(S): at 11:33

## 2024-06-11 RX ADMIN — PANTOPRAZOLE SODIUM 40 MILLIGRAM(S): 20 TABLET, DELAYED RELEASE ORAL at 05:32

## 2024-06-11 NOTE — DISCHARGE NOTE PROVIDER - DETAILS OF MALNUTRITION DIAGNOSIS/DIAGNOSES
This patient has been assessed with a concern for Malnutrition and was treated during this hospitalization for the following Nutrition diagnosis/diagnoses:     -  06/13/2024: Severe protein-calorie malnutrition   -  06/13/2024: Underweight (BMI < 19)

## 2024-06-11 NOTE — DISCHARGE NOTE PROVIDER - NSDCMRMEDTOKEN_GEN_ALL_CORE_FT
acetaminophen 325 mg oral tablet: 2 tab(s) orally every 6 hours, As needed, Mild Pain  acetaminophen-oxyCODONE 325 mg-5 mg oral tablet: 1 tab(s) orally every 4 hours, As needed, Severe Pain  amLODIPine 5 mg oral tablet: 1 tab(s) orally once a day  aspirin 81 mg oral delayed release tablet: 1 tab(s) orally once a day  atorvastatin 10 mg oral tablet: 1 tab(s) orally   enalapril 10 mg oral tablet: 1 tab(s) orally once a day  famotidine 20 mg oral tablet: 1 tab(s) orally 2 times a day  Lexapro 10 mg oral tablet: 1 tab(s) orally once a day  meloxicam 7.5 mg oral tablet: 1 tab(s) orally once a day (at bedtime)  metFORMIN 500 mg oral tablet: 0.5 tab(s) orally  pantoprazole 40 mg oral delayed release tablet: 1 tab(s) orally once a day   acetaminophen 325 mg oral tablet: 2 tab(s) orally every 6 hours As needed Temp greater or equal to 38C (100.4F), Mild Pain (1 - 3), Moderate Pain (4 - 6)  amLODIPine 5 mg oral tablet: 1 tab(s) orally once a day  atorvastatin 10 mg oral tablet: 1 tab(s) orally once a day  enalapril 10 mg oral tablet: 1 tab(s) orally once a day  famotidine 20 mg oral tablet: 1 tab(s) orally 2 times a day  levETIRAcetam 250 mg oral tablet: 1 tab(s) orally 2 times a day  Lexapro 10 mg oral tablet: 1 tab(s) orally once a day  metFORMIN 500 mg oral tablet: 0.5 tab(s) orally  pantoprazole 40 mg oral delayed release tablet: 1 tab(s) orally once a day

## 2024-06-11 NOTE — PHYSICAL THERAPY INITIAL EVALUATION ADULT - ACTIVE RANGE OF MOTION EXAMINATION, REHAB EVAL
left UE NA as per PA NWB pending CT of the left elbow, assessed during functional mobility, resistance not applied/Right UE Active ROM was WFL (within functional limits)/bilateral  lower extremity Active ROM was WFL (within functional limits)

## 2024-06-11 NOTE — DISCHARGE NOTE PROVIDER - NSDCFUADDINST_GEN_ALL_CORE_FT
Orthopedic Recommendation: no acute orthopedic surgical intervention at this time. Patient will be non weight bearing of left upper extremity. Pain control. DVTP per primary team. Return to the emergency room if you develop worsening pain that is not controlled with pain medications, develop numbness or change in sensation, or notice increasing weakness. Follow-up with (Dr. Lowry) in 10-14 days. Call the office for an appointment.

## 2024-06-11 NOTE — PHYSICAL THERAPY INITIAL EVALUATION ADULT - PERTINENT HX OF CURRENT PROBLEM, REHAB EVAL
as per medical chart: presenting after a mechanical ground level fall occurring 30 min PTA. Pt reported tripping and falling, + head strike on wooden floor,

## 2024-06-11 NOTE — PROGRESS NOTE ADULT - SUBJECTIVE AND OBJECTIVE BOX
HPI:  78 yo female w/ PMH of HTN, HLD presenting after a mechanical ground level fall occurring 30 min PTA. Pt reported tripping and falling, + head strike on wooden floor, denies LOC, dizziness, lightheadedness, chest pain or SOB that may  have contributed. denies neck pain, back pain, hip pain, or lower extremity pain. Patient denies weakness, numbness. Patient takes daily ASA.    INTERVAL HPI/OVERNIGHT EVENTS:  79-year-old female seen sitting up in bed this morning with attending during rounds. Denies any current headache, weakness, numbness, n/v/d, fevers, chills, chest pain, SOB.     Vital Signs Last 24 Hrs  T(C): 36.5 (11 Jun 2024 11:24), Max: 37.5 (10 Manish 2024 23:30)  T(F): 97.7 (11 Jun 2024 11:24), Max: 99.5 (10 Manish 2024 23:30)  HR: 81 (11 Jun 2024 08:00) (72 - 85)  BP: 123/68 (11 Jun 2024 08:00) (91/50 - 131/87)  BP(mean): 85 (11 Jun 2024 08:00) (63 - 98)  RR: 17 (11 Jun 2024 08:00) (11 - 24)  SpO2: 98% (11 Jun 2024 08:00) (94% - 98%)  Parameters below as of 11 Jun 2024 08:00  Patient On (Oxygen Delivery Method): room air    PHYSICAL EXAM:  GENERAL: NAD, well-groomed  HEAD: +left forehead laceration  MENTAL STATUS: AAOx3; awake; opens eyes spontaneously; appropriately conversant without aphasia; following simple commands  CRANIAL NERVES: PERRL. EOMI. Facial sensation intact V1-3 distribution b/l. Face grossly symmetric, tongue midline. Hearing grossly intact. Speech clear.   MOTOR: Strength 5/5 b/l upper and lower extremities, no drift  SENSATION: Grossly intact to light touch all extremities  CHEST/LUNG: Non-labored breathing on room-air  ABDOMEN: Soft, nontender, nondistended  SKIN: Warm, dry    LABS:                        11.2   5.33  )-----------( 169      ( 11 Jun 2024 05:50 )             35.4     06-11    143  |  108  |  21.7<H>  ----------------------------<  111<H>  3.6   |  25.0  |  0.24<L>    Ca    9.1      11 Jun 2024 05:50  Phos  3.5     06-11  Mg     2.1     06-11    TPro  7.4  /  Alb  4.7  /  TBili  0.4  /  DBili  x   /  AST  38<H>  /  ALT  26  /  AlkPhos  61  06-10    PT/INR - ( 10 Manish 2024 01:18 )   PT: 11.7 sec;   INR: 1.06 ratio    PTT - ( 10 Manish 2024 01:18 )  PTT:31.5 sec    Urinalysis Basic - ( 11 Jun 2024 05:50 )  Color: x / Appearance: x / SG: x / pH: x  Gluc: 111 mg/dL / Ketone: x  / Bili: x / Urobili: x   Blood: x / Protein: x / Nitrite: x   Leuk Esterase: x / RBC: x / WBC x   Sq Epi: x / Non Sq Epi: x / Bacteria: x    06-10 @ 07:01  -  06-11 @ 07:00  --------------------------------------------------------  IN: 1255 mL / OUT: 1050 mL / NET: 205 mL    06-11 @ 07:01 - 06-11 @ 13:08  --------------------------------------------------------  IN: 0 mL / OUT: 500 mL / NET: -500 mL    RADIOLOGY & ADDITIONAL TESTS:    CT Head No Cont (06.11.24 @ 01:37)  IMPRESSION:  Stable bilateral subarachnoid hemorrhage.    CT Head No Cont (06.10.24 @ 16:57)  IMPRESSION: Stable follow-up CT study.    CT Head No Cont (06.10.24 @ 09:53)  Impression: Subarachnoid hemorrhage is again seen with new areas   identified as well.    CT Angio Head w/ IV Cont (06.10.24 @ 03:18)  IMPRESSION:  NoncontrastCT Head: Bilateral sylvian fissure subarachnoid hemorrhage,   slightly more conspicuous on the left since most recent examination. No   midline shift or hydrocephalus.    CTA Neck: No significant flow-limiting stenosis or evidence of acute   dissection within the cervical carotid or vertebral arteries.    CTA Head: No proximal large vessel occlusion, significant stenosis, or   evidence of intracranial aneurysm.    CT Head No Cont (06.10.24 @ 00:47)   IMPRESSION:  Acute right sylvian fissure subarachnoid hemorrhage. No calvarial   fracture.

## 2024-06-11 NOTE — DISCHARGE NOTE PROVIDER - NSDCCPCAREPLAN_GEN_ALL_CORE_FT
PRINCIPAL DISCHARGE DIAGNOSIS  Diagnosis: Forehead laceration  Assessment and Plan of Treatment: Please follow up at the [facility] clinic in 1 week to have sutures removed. You may wash site with warm soap and water, please pat dry. Do not scrub sutures.      SECONDARY DISCHARGE DIAGNOSES  Diagnosis: SAH (subarachnoid hemorrhage)  Assessment and Plan of Treatment: ACTIVITY: No heavy lifting or straining. Otherwise, you may return to your usual level of physical activity.   DIET: Return to your usual diet.  NOTIFY YOUR SURGEON IF: You have any bleeding that does not stop, any pus draining from your wound(s), any fever (over 100.4 F) or chills, persistent nausea/vomiting, persistent diarrhea, or if your pain is not controlled on your discharge pain medications.  FOLLOW-UP: Please follow up with your primary care physician and [facility] Surgery clinic [phone] in one week regarding your hospitalization. Call for appointment upon discharge.    Diagnosis: Radial head fracture  Assessment and Plan of Treatment: please follow up with orthopedics team as discussed and continue non-weightbearing on L upper extremity     PRINCIPAL DISCHARGE DIAGNOSIS  Diagnosis: Forehead laceration  Assessment and Plan of Treatment: Please follow up at the ACS clinic in 1 week to have sutures removed. You may wash site with warm soap and water, please pat dry. Do not scrub sutures.      SECONDARY DISCHARGE DIAGNOSES  Diagnosis: SAH (subarachnoid hemorrhage)  Assessment and Plan of Treatment: Follow up: Please call and make an appointment with Dr. Bolden, Neurosurgery 2 weeks after discharge. Also, please call and make an appointment with your primary care physician as per your usual schedule.   Activity: May return to normal activities as tolerated, however refrain from heavy lifting > 10-15 lbs.  Diet: May continue your  usual diet.  Medications: Please take all medications listed on your discharge paperwork as prescribed.  You are encouraged to take over-the-counter tylenol for pain relief as directed.  DO NOT RESUME TAKING ASPIRIN AND MELOXICAM until told you can do so by Neurosurgery.    Wound Care: Please, keep wound site clean and dry. You may shower, but do not bathe.  Patient is advised to RETURN TO THE EMERGENCY DEPARTMENT for any of the following - worsening pain, fever/chills, nausea/vomiting, altered mental status, chest pain, shortness of breath, or any other new / worsening symptom.    Diagnosis: Radial head fracture  Assessment and Plan of Treatment: Orthopedic Recommendation: no acute orthopedic surgical intervention at this time. Patient will be non weight bearing of left upper extremity. Pain control. DVTP per primary team. Return to the emergency room if you develop worsening pain that is not controlled with pain medications, develop numbness or change in sensation, or notice increasing weakness. Follow-up with (Dr. Lowry) in 10-14 days. Call the office for an appointment.

## 2024-06-11 NOTE — PROGRESS NOTE ADULT - NS ATTEND AMEND GEN_ALL_CORE FT
I agree with the above. I personally examined and saw the patient. Neurointact. 24 hour head CT at 4pm was stable. Ok for q4 neurochecks, signing off, keppra for 2 weeks, hold AC/AP until outpatient follow up in 2 weeks.

## 2024-06-11 NOTE — PHYSICAL THERAPY INITIAL EVALUATION ADULT - BED MOBILITY LIMITATIONS, REHAB EVAL
left UE NWB/decreased ability to use arms for pushing/pulling/decreased ability to use legs for bridging/pushing

## 2024-06-11 NOTE — DISCHARGE NOTE PROVIDER - HOSPITAL COURSE
Admission H&P: 79y female w/ pmh of HTN, HLD presenting after a mechanical ground level fall occurring 30 min PTA. Pt reported tripping and falling, + head strike on wooden floor, denies LOC, dizziness, lightheadedness, chest pain or SOB that may  have contributed. denies neck pain, back pain, hip pain, or lower extremity pain. denies weakness, numbness. Patient takes daily asa. Patietn able to stand and ambulate with assistance after the fall.     A: Intact, speaking in full sentences  B: Bilateral breath sounds  C: Palpable central and peripheral pulses, no evidence of active bleeding.  D: GCS 15, no evident deficits  E: Patient exposed, no other evident injuries identified     Hospital Course: Patient's laceration was repaired in the trauma bay, tolerated procedure well. Patient was admitted to SICU for Q1H neuro-checks d/t diagnosis of subarachnoid hemorrhage. Serial head CT scans ordered, 6 hr scan revealed worsened bleed. However, repeat scans x5 revealed stable bleed. Neurosurgery consulted and recommended to hold Aspirin until outpatient follow up, additionally Keppra 250mg bid until outpatient follow up. Orthopedics also consulted after patient was found to have L radial fx, treated with non-operative management of splint and non-weight bearing status. Patient was provided outpatient follow up instructions with Dr. Lowry in 2 weeks following discharge. A tertiary examination was performed on 6/10, which did not reveal any new acute injuries. On 6/11 patient was evaluated by physical therapy who recommended home with PT services. On 6/12 patient was downgraded to the surgical floor from SICU. A geriatric consultation was placed, recommendations included.....      The rest of the patient's hospital course remained uneventful. She is tolerating diet and has appropriate pain control. Hemodynamically stable at time of discharge. Admission H&P (Copied): 79y female w/ pmh of HTN, HLD presenting after a mechanical ground level fall occurring 30 min PTA. Pt reported tripping and falling, + head strike on wooden floor, denies LOC, dizziness, lightheadedness, chest pain or SOB that may  have contributed. denies neck pain, back pain, hip pain, or lower extremity pain. denies weakness, numbness. Patient takes daily asa. Patietn able to stand and ambulate with assistance after the fall.     Hospital Course: Patient's laceration was repaired in the trauma bay, tolerated procedure well. Patient was admitted to SICU for Q1H neuro-checks d/t diagnosis of subarachnoid hemorrhage. Serial head CT scans ordered, 6 hr scan revealed worsened bleed. However, repeat scans x5 revealed stable bleed. Neurosurgery consulted and recommended to hold Aspirin until outpatient follow up, additionally Keppra 250mg bid until outpatient follow up. Orthopedics also consulted after patient was found to have L radial fx, treated with non-operative management of splint and non-weight bearing status. Patient was provided outpatient follow up instructions with Dr. Lowry in 2 weeks following discharge. A tertiary examination was performed on 6/10, which did not reveal any new acute injuries. On 6/11 patient was evaluated by physical therapy who recommended home with PT services. On 6/12 patient was downgraded to the surgical floor from SICU. Geriatrics saw the patient and no needs identified.  Medically stable for discharge home at this time.

## 2024-06-11 NOTE — DISCHARGE NOTE PROVIDER - CARE PROVIDER_API CALL
Harvey Arreaga  Orthopaedic Surgery  [address] [place] [address]  Phone: [phone]  Fax: [phone]  Follow Up Time: 2 weeks   Harvey Arreaga  Orthopaedic Surgery  [address] [place] [id number]  Phone: [phone]  Fax: [phone]  Follow Up Time: 2 weeks    Penny Bolden  Neurosurgery  [address] [place] [id number]  Phone: [phone]  Fax: [phone]  Follow Up Time:

## 2024-06-11 NOTE — PROGRESS NOTE ADULT - SUBJECTIVE AND OBJECTIVE BOX
INTERVAL HPI/OVERNIGHT EVENTS:      SUBJECTIVE:      PAST MEDICAL & SURGICAL HISTORY:  Osteoporosis      High blood pressure      Hyperlipidemia, unspecified hyperlipidemia      S/P hip replacement          ICU Vital Signs Last 24 Hrs  T(C): 37 (11 Jun 2024 03:40), Max: 37.5 (10 Manish 2024 23:30)  T(F): 98.6 (11 Jun 2024 03:40), Max: 99.5 (10 Manish 2024 23:30)  HR: 74 (11 Jun 2024 06:00) (72 - 90)  BP: 115/71 (11 Jun 2024 06:00) (91/50 - 131/87)  BP(mean): 84 (11 Jun 2024 06:00) (63 - 98)  ABP: --  ABP(mean): --  RR: 17 (11 Jun 2024 06:00) (11 - 25)  SpO2: 94% (11 Jun 2024 06:00) (94% - 98%)    O2 Parameters below as of 11 Jun 2024 04:00  Patient On (Oxygen Delivery Method): room air          I&O's Detail    09 Jun 2024 07:01  -  10 Manish 2024 07:00  --------------------------------------------------------  IN:    sodium chloride 0.9%: 75 mL  Total IN: 75 mL    OUT:  Total OUT: 0 mL    Total NET: 75 mL      10 Manish 2024 07:01  -  11 Jun 2024 06:51  --------------------------------------------------------  IN:    Oral Fluid: 880 mL    sodium chloride 0.9%: 75 mL    sodium chloride 0.9%: 300 mL  Total IN: 1255 mL    OUT:    Voided (mL): 1050 mL  Total OUT: 1050 mL    Total NET: 205 mL          MEDICATIONS  (STANDING):  amLODIPine   Tablet 5 milliGRAM(s) Oral daily  atorvastatin 10 milliGRAM(s) Oral at bedtime  chlorhexidine 2% Cloths 1 Application(s) Topical daily  enalapril 10 milliGRAM(s) Oral daily  escitalopram 10 milliGRAM(s) Oral daily  famotidine    Tablet 20 milliGRAM(s) Oral two times a day  lidocaine 1%/epinephrine 1:100,000 Inj 10 milliLiter(s) Local Injection Once  pantoprazole    Tablet 40 milliGRAM(s) Oral before breakfast    MEDICATIONS  (PRN):  acetaminophen   IVPB .. 650 milliGRAM(s) IV Intermittent every 6 hours PRN Mild Pain (1 - 3), Moderate Pain (4 - 6), Severe Pain (7 - 10)    Drug Dosing Weight  Height (cm): 157.5 (10 Manish 2024 03:58)  Weight (kg): 37.4 (10 Manish 2024 03:58)  BMI (kg/m2): 15.1 (10 Manish 2024 03:58)  BSA (m2): 1.31 (10 Manish 2024 03:58)    LABS:    CBC Full  -  ( 11 Jun 2024 05:50 )  WBC Count : 5.33 K/uL  RBC Count : 4.17 M/uL  Hemoglobin : 11.2 g/dL  Hematocrit : 35.4 %  Platelet Count - Automated : 169 K/uL  Mean Cell Volume : 84.9 fl  Mean Cell Hemoglobin : 26.9 pg  Mean Cell Hemoglobin Concentration : 31.6 gm/dL  Auto Neutrophil # : 2.70 K/uL  Auto Lymphocyte # : 1.43 K/uL  Auto Monocyte # : 0.65 K/uL  Auto Eosinophil # : 0.51 K/uL  Auto Basophil # : 0.03 K/uL  Auto Neutrophil % : 50.6 %  Auto Lymphocyte % : 26.8 %  Auto Monocyte % : 12.2 %  Auto Eosinophil % : 9.6 %  Auto Basophil % : 0.6 %    06-11    143  |  108  |  21.7<H>  ----------------------------<  111<H>  3.6   |  25.0  |  0.24<L>    Ca    9.1      11 Jun 2024 05:50  Phos  3.5     06-11  Mg     2.1     06-11    TPro  7.4  /  Alb  4.7  /  TBili  0.4  /  DBili  x   /  AST  38<H>  /  ALT  26  /  AlkPhos  61  06-10    PT/INR - ( 10 Manish 2024 01:18 )   PT: 11.7 sec;   INR: 1.06 ratio         PTT - ( 10 Manish 2024 01:18 )  PTT:31.5 sec  Urinalysis Basic - ( 11 Jun 2024 05:50 )    Color: x / Appearance: x / SG: x / pH: x  Gluc: 111 mg/dL / Ketone: x  / Bili: x / Urobili: x   Blood: x / Protein: x / Nitrite: x   Leuk Esterase: x / RBC: x / WBC x   Sq Epi: x / Non Sq Epi: x / Bacteria: x        Physical Exam:  GENERAL:  NEUROLOGIC:  HEENT:  NECK:  CHEST/LUNG:  HEART:  ABDOMEN:  EXTREMITIES:  SKIN:    PATIENT CARE ACCESS DEVICES:  [ ] Peripheral IV  [ ] Central Venous Line	[ ] R	[ ] L	[ ] IJ	[ ] Fem	[ ] SC	   [ ] Arterial Line		[ ] R	[ ] L	[ ] Fem	[ ] Rad	[ ] Ax	   [ ] PICC:					[ ] Mediport  [ ] Urinary Catheter:   [ ] Necessity of urinary, arterial, and venous catheters discussed           INTERVAL HPI/OVERNIGHT EVENTS:  3rd scan worse, 4th scan at 4pm was stable.      SUBJECTIVE:      PAST MEDICAL & SURGICAL HISTORY:  Osteoporosis      High blood pressure      Hyperlipidemia, unspecified hyperlipidemia      S/P hip replacement          ICU Vital Signs Last 24 Hrs  T(C): 37 (11 Jun 2024 03:40), Max: 37.5 (10 Manish 2024 23:30)  T(F): 98.6 (11 Jun 2024 03:40), Max: 99.5 (10 Manish 2024 23:30)  HR: 74 (11 Jun 2024 06:00) (72 - 90)  BP: 115/71 (11 Jun 2024 06:00) (91/50 - 131/87)  BP(mean): 84 (11 Jun 2024 06:00) (63 - 98)  ABP: --  ABP(mean): --  RR: 17 (11 Jun 2024 06:00) (11 - 25)  SpO2: 94% (11 Jun 2024 06:00) (94% - 98%)    O2 Parameters below as of 11 Jun 2024 04:00  Patient On (Oxygen Delivery Method): room air          I&O's Detail    09 Jun 2024 07:01  -  10 Manish 2024 07:00  --------------------------------------------------------  IN:    sodium chloride 0.9%: 75 mL  Total IN: 75 mL    OUT:  Total OUT: 0 mL    Total NET: 75 mL      10 Manish 2024 07:01  -  11 Jun 2024 06:51  --------------------------------------------------------  IN:    Oral Fluid: 880 mL    sodium chloride 0.9%: 75 mL    sodium chloride 0.9%: 300 mL  Total IN: 1255 mL    OUT:    Voided (mL): 1050 mL  Total OUT: 1050 mL    Total NET: 205 mL          MEDICATIONS  (STANDING):  amLODIPine   Tablet 5 milliGRAM(s) Oral daily  atorvastatin 10 milliGRAM(s) Oral at bedtime  chlorhexidine 2% Cloths 1 Application(s) Topical daily  enalapril 10 milliGRAM(s) Oral daily  escitalopram 10 milliGRAM(s) Oral daily  famotidine    Tablet 20 milliGRAM(s) Oral two times a day  lidocaine 1%/epinephrine 1:100,000 Inj 10 milliLiter(s) Local Injection Once  pantoprazole    Tablet 40 milliGRAM(s) Oral before breakfast    MEDICATIONS  (PRN):  acetaminophen   IVPB .. 650 milliGRAM(s) IV Intermittent every 6 hours PRN Mild Pain (1 - 3), Moderate Pain (4 - 6), Severe Pain (7 - 10)    Drug Dosing Weight  Height (cm): 157.5 (10 Manish 2024 03:58)  Weight (kg): 37.4 (10 Manish 2024 03:58)  BMI (kg/m2): 15.1 (10 Manish 2024 03:58)  BSA (m2): 1.31 (10 Manish 2024 03:58)    LABS:    CBC Full  -  ( 11 Jun 2024 05:50 )  WBC Count : 5.33 K/uL  RBC Count : 4.17 M/uL  Hemoglobin : 11.2 g/dL  Hematocrit : 35.4 %  Platelet Count - Automated : 169 K/uL  Mean Cell Volume : 84.9 fl  Mean Cell Hemoglobin : 26.9 pg  Mean Cell Hemoglobin Concentration : 31.6 gm/dL  Auto Neutrophil # : 2.70 K/uL  Auto Lymphocyte # : 1.43 K/uL  Auto Monocyte # : 0.65 K/uL  Auto Eosinophil # : 0.51 K/uL  Auto Basophil # : 0.03 K/uL  Auto Neutrophil % : 50.6 %  Auto Lymphocyte % : 26.8 %  Auto Monocyte % : 12.2 %  Auto Eosinophil % : 9.6 %  Auto Basophil % : 0.6 %    06-11    143  |  108  |  21.7<H>  ----------------------------<  111<H>  3.6   |  25.0  |  0.24<L>    Ca    9.1      11 Jun 2024 05:50  Phos  3.5     06-11  Mg     2.1     06-11    TPro  7.4  /  Alb  4.7  /  TBili  0.4  /  DBili  x   /  AST  38<H>  /  ALT  26  /  AlkPhos  61  06-10    PT/INR - ( 10 Manish 2024 01:18 )   PT: 11.7 sec;   INR: 1.06 ratio         PTT - ( 10 Manish 2024 01:18 )  PTT:31.5 sec  Urinalysis Basic - ( 11 Jun 2024 05:50 )    Color: x / Appearance: x / SG: x / pH: x  Gluc: 111 mg/dL / Ketone: x  / Bili: x / Urobili: x   Blood: x / Protein: x / Nitrite: x   Leuk Esterase: x / RBC: x / WBC x   Sq Epi: x / Non Sq Epi: x / Bacteria: x        Physical Exam:  GENERAL:  NEUROLOGIC:  HEENT:  NECK:  CHEST/LUNG:  HEART:  ABDOMEN:  EXTREMITIES:  SKIN:    PATIENT CARE ACCESS DEVICES:  [ ] Peripheral IV  [ ] Central Venous Line	[ ] R	[ ] L	[ ] IJ	[ ] Fem	[ ] SC	   [ ] Arterial Line		[ ] R	[ ] L	[ ] Fem	[ ] Rad	[ ] Ax	   [ ] PICC:					[ ] Mediport  [ ] Urinary Catheter:   [ ] Necessity of urinary, arterial, and venous catheters discussed           INTERVAL HPI/OVERNIGHT EVENTS:  3rd scan worse, 4th scan at 4pm was stable.      SUBJECTIVE:  Feels well but reports ongoing headaches    PAST MEDICAL & SURGICAL HISTORY:  Osteoporosis      High blood pressure      Hyperlipidemia, unspecified hyperlipidemia      S/P hip replacement          ICU Vital Signs Last 24 Hrs  T(C): 37 (11 Jun 2024 03:40), Max: 37.5 (10 Manish 2024 23:30)  T(F): 98.6 (11 Jun 2024 03:40), Max: 99.5 (10 Manish 2024 23:30)  HR: 74 (11 Jun 2024 06:00) (72 - 90)  BP: 115/71 (11 Jun 2024 06:00) (91/50 - 131/87)  BP(mean): 84 (11 Jun 2024 06:00) (63 - 98)  ABP: --  ABP(mean): --  RR: 17 (11 Jun 2024 06:00) (11 - 25)  SpO2: 94% (11 Jun 2024 06:00) (94% - 98%)    O2 Parameters below as of 11 Jun 2024 04:00  Patient On (Oxygen Delivery Method): room air          I&O's Detail    09 Jun 2024 07:01  -  10 Manish 2024 07:00  --------------------------------------------------------  IN:    sodium chloride 0.9%: 75 mL  Total IN: 75 mL    OUT:  Total OUT: 0 mL    Total NET: 75 mL      10 Manish 2024 07:01  -  11 Jun 2024 06:51  --------------------------------------------------------  IN:    Oral Fluid: 880 mL    sodium chloride 0.9%: 75 mL    sodium chloride 0.9%: 300 mL  Total IN: 1255 mL    OUT:    Voided (mL): 1050 mL  Total OUT: 1050 mL    Total NET: 205 mL          MEDICATIONS  (STANDING):  amLODIPine   Tablet 5 milliGRAM(s) Oral daily  atorvastatin 10 milliGRAM(s) Oral at bedtime  chlorhexidine 2% Cloths 1 Application(s) Topical daily  enalapril 10 milliGRAM(s) Oral daily  escitalopram 10 milliGRAM(s) Oral daily  famotidine    Tablet 20 milliGRAM(s) Oral two times a day  lidocaine 1%/epinephrine 1:100,000 Inj 10 milliLiter(s) Local Injection Once  pantoprazole    Tablet 40 milliGRAM(s) Oral before breakfast    MEDICATIONS  (PRN):  acetaminophen   IVPB .. 650 milliGRAM(s) IV Intermittent every 6 hours PRN Mild Pain (1 - 3), Moderate Pain (4 - 6), Severe Pain (7 - 10)    Drug Dosing Weight  Height (cm): 157.5 (10 Manish 2024 03:58)  Weight (kg): 37.4 (10 Manish 2024 03:58)  BMI (kg/m2): 15.1 (10 Manish 2024 03:58)  BSA (m2): 1.31 (10 Manish 2024 03:58)    LABS:    CBC Full  -  ( 11 Jun 2024 05:50 )  WBC Count : 5.33 K/uL  RBC Count : 4.17 M/uL  Hemoglobin : 11.2 g/dL  Hematocrit : 35.4 %  Platelet Count - Automated : 169 K/uL  Mean Cell Volume : 84.9 fl  Mean Cell Hemoglobin : 26.9 pg  Mean Cell Hemoglobin Concentration : 31.6 gm/dL  Auto Neutrophil # : 2.70 K/uL  Auto Lymphocyte # : 1.43 K/uL  Auto Monocyte # : 0.65 K/uL  Auto Eosinophil # : 0.51 K/uL  Auto Basophil # : 0.03 K/uL  Auto Neutrophil % : 50.6 %  Auto Lymphocyte % : 26.8 %  Auto Monocyte % : 12.2 %  Auto Eosinophil % : 9.6 %  Auto Basophil % : 0.6 %    06-11    143  |  108  |  21.7<H>  ----------------------------<  111<H>  3.6   |  25.0  |  0.24<L>    Ca    9.1      11 Jun 2024 05:50  Phos  3.5     06-11  Mg     2.1     06-11    TPro  7.4  /  Alb  4.7  /  TBili  0.4  /  DBili  x   /  AST  38<H>  /  ALT  26  /  AlkPhos  61  06-10    PT/INR - ( 10 Manish 2024 01:18 )   PT: 11.7 sec;   INR: 1.06 ratio         PTT - ( 10 Manish 2024 01:18 )  PTT:31.5 sec  Urinalysis Basic - ( 11 Jun 2024 05:50 )    Color: x / Appearance: x / SG: x / pH: x  Gluc: 111 mg/dL / Ketone: x  / Bili: x / Urobili: x   Blood: x / Protein: x / Nitrite: x   Leuk Esterase: x / RBC: x / WBC x   Sq Epi: x / Non Sq Epi: x / Bacteria: x        Physical Exam:  GENERAL:  NEUROLOGIC:  HEENT:  NECK:  CHEST/LUNG:  HEART:  ABDOMEN:  EXTREMITIES:  SKIN:    PATIENT CARE ACCESS DEVICES:  [ ] Peripheral IV  [ ] Central Venous Line	[ ] R	[ ] L	[ ] IJ	[ ] Fem	[ ] SC	   [ ] Arterial Line		[ ] R	[ ] L	[ ] Fem	[ ] Rad	[ ] Ax	   [ ] PICC:					[ ] Mediport  [ ] Urinary Catheter:   [ ] Necessity of urinary, arterial, and venous catheters discussed           INTERVAL HPI/OVERNIGHT EVENTS:  3rd scan worse, 4th scan at 4pm was stable.      SUBJECTIVE:  Feels well but reports ongoing headaches    PAST MEDICAL & SURGICAL HISTORY:  Osteoporosis      High blood pressure      Hyperlipidemia, unspecified hyperlipidemia      S/P hip replacement          ICU Vital Signs Last 24 Hrs  T(C): 37 (11 Jun 2024 03:40), Max: 37.5 (10 Manish 2024 23:30)  T(F): 98.6 (11 Jun 2024 03:40), Max: 99.5 (10 Manish 2024 23:30)  HR: 74 (11 Jun 2024 06:00) (72 - 90)  BP: 115/71 (11 Jun 2024 06:00) (91/50 - 131/87)  BP(mean): 84 (11 Jun 2024 06:00) (63 - 98)  ABP: --  ABP(mean): --  RR: 17 (11 Jun 2024 06:00) (11 - 25)  SpO2: 94% (11 Jun 2024 06:00) (94% - 98%)    O2 Parameters below as of 11 Jun 2024 04:00  Patient On (Oxygen Delivery Method): room air          I&O's Detail    09 Jun 2024 07:01  -  10 Manish 2024 07:00  --------------------------------------------------------  IN:    sodium chloride 0.9%: 75 mL  Total IN: 75 mL    OUT:  Total OUT: 0 mL    Total NET: 75 mL      10 Manish 2024 07:01  -  11 Jun 2024 06:51  --------------------------------------------------------  IN:    Oral Fluid: 880 mL    sodium chloride 0.9%: 75 mL    sodium chloride 0.9%: 300 mL  Total IN: 1255 mL    OUT:    Voided (mL): 1050 mL  Total OUT: 1050 mL    Total NET: 205 mL          MEDICATIONS  (STANDING):  amLODIPine   Tablet 5 milliGRAM(s) Oral daily  atorvastatin 10 milliGRAM(s) Oral at bedtime  chlorhexidine 2% Cloths 1 Application(s) Topical daily  enalapril 10 milliGRAM(s) Oral daily  escitalopram 10 milliGRAM(s) Oral daily  famotidine    Tablet 20 milliGRAM(s) Oral two times a day  lidocaine 1%/epinephrine 1:100,000 Inj 10 milliLiter(s) Local Injection Once  pantoprazole    Tablet 40 milliGRAM(s) Oral before breakfast    MEDICATIONS  (PRN):  acetaminophen   IVPB .. 650 milliGRAM(s) IV Intermittent every 6 hours PRN Mild Pain (1 - 3), Moderate Pain (4 - 6), Severe Pain (7 - 10)    Drug Dosing Weight  Height (cm): 157.5 (10 Manish 2024 03:58)  Weight (kg): 37.4 (10 Manish 2024 03:58)  BMI (kg/m2): 15.1 (10 Manish 2024 03:58)  BSA (m2): 1.31 (10 Manish 2024 03:58)    LABS:    CBC Full  -  ( 11 Jun 2024 05:50 )  WBC Count : 5.33 K/uL  RBC Count : 4.17 M/uL  Hemoglobin : 11.2 g/dL  Hematocrit : 35.4 %  Platelet Count - Automated : 169 K/uL  Mean Cell Volume : 84.9 fl  Mean Cell Hemoglobin : 26.9 pg  Mean Cell Hemoglobin Concentration : 31.6 gm/dL  Auto Neutrophil # : 2.70 K/uL  Auto Lymphocyte # : 1.43 K/uL  Auto Monocyte # : 0.65 K/uL  Auto Eosinophil # : 0.51 K/uL  Auto Basophil # : 0.03 K/uL  Auto Neutrophil % : 50.6 %  Auto Lymphocyte % : 26.8 %  Auto Monocyte % : 12.2 %  Auto Eosinophil % : 9.6 %  Auto Basophil % : 0.6 %    06-11    143  |  108  |  21.7<H>  ----------------------------<  111<H>  3.6   |  25.0  |  0.24<L>    Ca    9.1      11 Jun 2024 05:50  Phos  3.5     06-11  Mg     2.1     06-11    TPro  7.4  /  Alb  4.7  /  TBili  0.4  /  DBili  x   /  AST  38<H>  /  ALT  26  /  AlkPhos  61  06-10    PT/INR - ( 10 Manish 2024 01:18 )   PT: 11.7 sec;   INR: 1.06 ratio         PTT - ( 10 Manish 2024 01:18 )  PTT:31.5 sec  Urinalysis Basic - ( 11 Jun 2024 05:50 )    Color: x / Appearance: x / SG: x / pH: x  Gluc: 111 mg/dL / Ketone: x  / Bili: x / Urobili: x   Blood: x / Protein: x / Nitrite: x   Leuk Esterase: x / RBC: x / WBC x   Sq Epi: x / Non Sq Epi: x / Bacteria: x        Physical Exam:  GENERAL: Sitting comfortably in bed  NEUROLOGIC: Alert, oriented, moves all extremities without deficits  HEENT: L eyebrow laceration -sutured - clean and dry, ecchymosis left periorbital  CHEST/LUNG: Breathing comfortably on RA  HEART: Regular rate on monitor  ABDOMEN: Soft, non tender, non distended  EXTREMITIES: Warm, no edema at ankles, very then, well healing knee incisions (recent L knee surgery), L forearm/arm wrapped in ace wrap, fingers warm and easily able to move  SKIN: No rashes    PATIENT CARE ACCESS DEVICES:  [ x] Peripheral IV  [ ] Central Venous Line	[ ] R	[ ] L	[ ] IJ	[ ] Fem	[ ] SC	   [ ] Arterial Line		[ ] R	[ ] L	[ ] Fem	[ ] Rad	[ ] Ax	   [ ] PICC:					[ ] Mediport  [ ] Urinary Catheter:   [ ] Necessity of urinary, arterial, and venous catheters discussed

## 2024-06-11 NOTE — DISCHARGE NOTE PROVIDER - PROVIDER TOKENS
PROVIDER:[TOKEN:[[id number]],FOLLOWUP:[2 weeks]] PROVIDER:[TOKEN:[[id number]],FOLLOWUP:[2 weeks]],PROVIDER:[TOKEN:[[id number]]]

## 2024-06-11 NOTE — DISCHARGE NOTE PROVIDER - CARE PROVIDERS DIRECT ADDRESSES
[email or web address]

## 2024-06-11 NOTE — PHYSICAL THERAPY INITIAL EVALUATION ADULT - NSPTDISCHREC_GEN_A_CORE
with assistance as needed, rehab disposition recommendation may be change base on patient  functional progress and social support/Home PT

## 2024-06-11 NOTE — PHYSICAL THERAPY INITIAL EVALUATION ADULT - ADDITIONAL COMMENTS
as per pt: resides in the private house with 2 steps to enter  (+) rail and 4 step (+) rail to the bedroom, owns SAC, RW, denies other falls but the one that brought her to ED, Son available to assist as needed upon D/C home

## 2024-06-11 NOTE — CONSULT NOTE ADULT - SUBJECTIVE AND OBJECTIVE BOX
Pt Name: JENNI ALLAN    MRN: [id number]      Patient is a 79y Female being managed by SICU after having mechanical ground fall and hitting head. Patient states that she came to ED because of a laceration above eye. CT scans were done and showed SAH. Orthopedics called after left elbow xrays were done showing a possible left radial head fracture. Denies CP, SOB, numbness, tingling.       REVIEW OF SYSTEMS    General: Alert, responsive, in NAD    Skin/Breast: No rashes, no pruritis     Respiratory and Thorax: No difficulty breathing. No cough.  	   Cardiovascular:	No chest pain. No palpitations.    Gastrointestinal:	 No abdominal pain. No diarrhea.     Musculoskeletal: SEE HPI.    Neurological: No sensory or motor changes.     Hematology/Lymphatics: No swelling.    ROS is otherwise negative.      PAST MEDICAL & SURGICAL HISTORY:  Osteoporosis      High blood pressure      Hyperlipidemia, unspecified hyperlipidemia      S/P hip replacement          Allergies: sulfa drugs (Unknown)      Medications: acetaminophen   IVPB .. 650 milliGRAM(s) IV Intermittent every 6 hours PRN  amLODIPine   Tablet 5 milliGRAM(s) Oral daily  atorvastatin 10 milliGRAM(s) Oral at bedtime  chlorhexidine 2% Cloths 1 Application(s) Topical daily  enalapril 10 milliGRAM(s) Oral daily  escitalopram 10 milliGRAM(s) Oral daily  famotidine    Tablet 20 milliGRAM(s) Oral two times a day  lidocaine 1%/epinephrine 1:100,000 Inj 10 milliLiter(s) Local Injection Once  pantoprazole    Tablet 40 milliGRAM(s) Oral before breakfast      FAMILY HISTORY:  No pertinent family history    : non-contributory    Social History:                           11.2   5.33  )-----------( 169      ( 11 Jun 2024 05:50 )             35.4       06-11    143  |  108  |  21.7<H>  ----------------------------<  111<H>  3.6   |  25.0  |  0.24<L>    Ca    9.1      11 Jun 2024 05:50  Phos  3.5     06-11  Mg     2.1     06-11    TPro  7.4  /  Alb  4.7  /  TBili  0.4  /  DBili  x   /  AST  38<H>  /  ALT  26  /  AlkPhos  61  06-10      Vital Signs Last 24 Hrs  T(C): 36.5 (11 Jun 2024 11:24), Max: 37.5 (10 Manish 2024 23:30)  T(F): 97.7 (11 Jun 2024 11:24), Max: 99.5 (10 Manish 2024 23:30)  HR: 81 (11 Jun 2024 08:00) (72 - 85)  BP: 123/68 (11 Jun 2024 08:00) (91/50 - 131/87)  BP(mean): 85 (11 Jun 2024 08:00) (63 - 98)  RR: 17 (11 Jun 2024 08:00) (11 - 24)  SpO2: 98% (11 Jun 2024 08:00) (94% - 98%)    Parameters below as of 11 Jun 2024 08:00  Patient On (Oxygen Delivery Method): room air        Daily     Daily       PHYSICAL EXAM:    Appearance: Alert, responsive, in no acute distress.    Musculoskeletal:         Left Upper Extremity: + ecchymosis near elbow. No open wounds noted. Skin clean, dry, intact. Sensation grossly intact to light touch distally. +WF/WE/ROM of fingers. + radial pulse.       Imaging Studies:  < from: Xray Elbow AP + Lateral + Oblique, Left (06.09.24 @ 20:01) >    ACC: [id number] EXAM:  XR ELBOW COMP MIN 3V LT   ORDERED BY: LEON GIVENS     PROCEDURE DATE:  06/09/2024          INTERPRETATION:  Left elbow. 3 views. Patient had a fall with local   trauma.    A true lateral view was not obtained.    There are mild degenerative changes around the elbow. A small radial head   fracture is possible.    IMPRESSION: Possible left radial head fracture. A note was posted on the   ED discrepancy site on Keren 10 at 1:59 PM.    --- End of Report ---            NICHOLSA AZEVEDO MD; Attending Radiologist  This document has been electronically signed. Manish 10 2024  1:59PM    < end of copied text >          A/P:  Pt is a  79y Female with possible left radial head fracture     PLAN:   * Plan and images discussed with Dr. Arreaga   * NWB of ABHIJEET   * [place]   * pain control   * no acute orthopedic intervention   * Follow up with Dr. Arreaga in office  Pt Name: JENNI ALLAN    MRN: [id number]      Patient is a 79y Female being managed by SICU after having mechanical ground fall and hitting head. Patient states that she came to ED because of a laceration above eye. CT scans were done and showed SAH. Orthopedics called after left elbow xrays were done showing a possible left radial head fracture. Denies CP, SOB, numbness, tingling.       REVIEW OF SYSTEMS    General: Alert, responsive, in NAD    Skin/Breast: No rashes, no pruritis     Respiratory and Thorax: No difficulty breathing. No cough.  	   Cardiovascular:	No chest pain. No palpitations.    Gastrointestinal:	 No abdominal pain. No diarrhea.     Musculoskeletal: SEE HPI.    Neurological: No sensory or motor changes.     Hematology/Lymphatics: No swelling.    ROS is otherwise negative.      PAST MEDICAL & SURGICAL HISTORY:  Osteoporosis      High blood pressure      Hyperlipidemia, unspecified hyperlipidemia      S/P hip replacement          Allergies: sulfa drugs (Unknown)      Medications: acetaminophen   IVPB .. 650 milliGRAM(s) IV Intermittent every 6 hours PRN  amLODIPine   Tablet 5 milliGRAM(s) Oral daily  atorvastatin 10 milliGRAM(s) Oral at bedtime  chlorhexidine 2% Cloths 1 Application(s) Topical daily  enalapril 10 milliGRAM(s) Oral daily  escitalopram 10 milliGRAM(s) Oral daily  famotidine    Tablet 20 milliGRAM(s) Oral two times a day  lidocaine 1%/epinephrine 1:100,000 Inj 10 milliLiter(s) Local Injection Once  pantoprazole    Tablet 40 milliGRAM(s) Oral before breakfast      FAMILY HISTORY:  No pertinent family history    : non-contributory    Social History:                           11.2   5.33  )-----------( 169      ( 11 Jun 2024 05:50 )             35.4       06-11    143  |  108  |  21.7<H>  ----------------------------<  111<H>  3.6   |  25.0  |  0.24<L>    Ca    9.1      11 Jun 2024 05:50  Phos  3.5     06-11  Mg     2.1     06-11    TPro  7.4  /  Alb  4.7  /  TBili  0.4  /  DBili  x   /  AST  38<H>  /  ALT  26  /  AlkPhos  61  06-10      Vital Signs Last 24 Hrs  T(C): 36.5 (11 Jun 2024 11:24), Max: 37.5 (10 Manish 2024 23:30)  T(F): 97.7 (11 Jun 2024 11:24), Max: 99.5 (10 Manish 2024 23:30)  HR: 81 (11 Jun 2024 08:00) (72 - 85)  BP: 123/68 (11 Jun 2024 08:00) (91/50 - 131/87)  BP(mean): 85 (11 Jun 2024 08:00) (63 - 98)  RR: 17 (11 Jun 2024 08:00) (11 - 24)  SpO2: 98% (11 Jun 2024 08:00) (94% - 98%)    Parameters below as of 11 Jun 2024 08:00  Patient On (Oxygen Delivery Method): room air        Daily     Daily       PHYSICAL EXAM:    Appearance: Alert, responsive, in no acute distress.    Musculoskeletal:         Left Upper Extremity: + ecchymosis near elbow. No open wounds noted. Skin clean, dry, intact. Sensation grossly intact to light touch distally. +WF/WE/ROM of fingers. + radial pulse.       Imaging Studies:  < from: Xray Elbow AP + Lateral + Oblique, Left (06.09.24 @ 20:01) >    ACC: [id number] EXAM:  XR ELBOW COMP MIN 3V LT   ORDERED BY: LEON GIVENS     PROCEDURE DATE:  06/09/2024          INTERPRETATION:  Left elbow. 3 views. Patient had a fall with local   trauma.    A true lateral view was not obtained.    There are mild degenerative changes around the elbow. A small radial head   fracture is possible.    IMPRESSION: Possible left radial head fracture. A note was posted on the   ED discrepancy site on Keren 10 at 1:59 PM.    --- End of Report ---            NICHOLAS AZEVEDO MD; Attending Radiologist  This document has been electronically signed. Manish 10 2024  1:59PM    < end of copied text >    SPLINTING   PROCEDURE NOTE: Splinting    Performed by: Robby Britt PA-C     Indication: left radial head fracture    The left elbow was appropriately positioned. A plaster splint was applied. Distally, the extremity was neurovascular intact following the procedure. The patient tolerated the procedure well.        A/P:  Pt is a  79y Female with possible left radial head fracture     PLAN:   * Plan and images discussed with Dr. Arreaga   * splinted   * sling for comfort   * NWB of ABHIJEET   * pain control   * F/u CT of left elbow   * Follow up with Dr. Arreaga in office

## 2024-06-11 NOTE — PROGRESS NOTE ADULT - ASSESSMENT
79-year-old female with PMH of HTN, HLD presenting after a mechanical ground level fall. Pt reported tripping and falling, + head strike on wooden floor, denies LOC, dizziness, lightheadedness, chest pain or SOB that may have contributed. Denies neck pain, back pain, hip pain, or lower extremity pain. denies weakness, numbness. Patient takes daily ASA 81mg.    Plan:  - Q1h neuro checks until stable scan  - Pending 24h stability CTH (due at 4pm 6/11), if stable CTH patient ok for Q4h neuro checks  - Keppra 250mg BID for seizure ppx- continue until outpatient  - Pain control PRN, avoid oversedation  - SBP goal normotensive <160  - Continue to hold ASA 81mg until cleared to restart outpatient  - Please have patient follow-up with Dr. Bolden in 2 weeks   - SCDs for DVT ppx, hold chemoprophylaxis for now  - Discussed with Dr. Bolden

## 2024-06-11 NOTE — CONSULT NOTE ADULT - NS ATTEND AMEND GEN_ALL_CORE FT
S/P fall with left elbow pain and noted to have a left radial head fracture with subluxation - splint and reduction completed with improvement in alignment - CT completed with comminution of radial head and will follow up as outpatient for definitive management of elbow injury - continue with closed treatment of left radial head fracture for now.
I agree with the above. I personally examined and saw the patient. Neurointact. Repeat head CT this morning with slight increase in size, will repeat another 6 hour scan. Rest of plan as above. Q1 neurochecks, keppra, hold AC/AP.

## 2024-06-11 NOTE — DISCHARGE NOTE PROVIDER - NSDCFUSCHEDAPPT_GEN_ALL_CORE_FT
Zack Dunaway  [facility]  [address]  Scheduled Appointment: 07/02/2024    [facility]  [address] [facility] [address]  Scheduled Appointment: 08/29/2024

## 2024-06-11 NOTE — DISCHARGE NOTE PROVIDER - NSFOLLOWUPCLINICS_GEN_ALL_ED_FT
[facility] Acute Care Surgery  Acute Care Surgery  [address] [place] [id number]  Phone: [phone]  Fax:   Follow Up Time: 1 week

## 2024-06-11 NOTE — PROGRESS NOTE ADULT - ASSESSMENT
Neuro:   Cardiovascular:   Pulmonary:   Heme:   GI:   :   ID:   Fluids:   Endocrine/Electrolytes:   Skin:   Lines:   Code Status:            Neuro: Pending repeat CT scan to assess for 24 hour stability at 4pm, continue w/q1hr neuro checks  Cardiovascular:   Pulmonary: Breathing comfortably on RA, OOB to chair, PT/OT  Heme: H/H stable, scds, no chemical prophylaxis until 24 hour stability scan  GI: Tolerating diet  : Voiding, +250  ID: Afebrile, no antibiotics, normal wbc.  Fluids: no IVF  Endocrine/Electrolytes:   Skin:   Lines: PIV  Code Status: Full   79 year old woman w/hx of HTN and HLD who was admitted 6/10/24 after she tripped on the leg of a chair and fell with head-strike on wooden floor.  Found to have a traumatic subarachnoid hemorrhage.  Left eyebrow laceration.  L periorbital ecchymosis.  L elbow/forearm ecchymosis.    Neuro: Pending repeat CT scan to assess for 24 hour stability at 4pm, continue w/q1hr neuro checks until then, neuro exam remains stable  Cardiovascular:   Pulmonary: Breathing comfortably on RA, OOB to chair, PT/OT  Heme: H/H stable, scds, no chemical prophylaxis until 24 hour stability scan  GI: Tolerating diet, patient reports eating prunes at home - encouraged her to have some today  : Voiding, +250  ID: Afebrile, no antibiotics, normal wbc.  Fluids: no IVF  Endocrine/Electrolytes: Lytes adequate  Skin: No rashes  Lines: PIV  Code Status: Full  Ortho: CT suggests possible L radial head fracture.  Splinted per ortho.  Plan for outpatient f/u.  Non WB to the LUE.    To remain in critical care setting w/q1hr neuro checks until repeat stability scan completed.   79 year old woman w/hx of HTN and HLD who was admitted 6/10/24 after she tripped on the leg of a chair and fell with head-strike on wooden floor.  Found to have a traumatic subarachnoid hemorrhage.  Left eyebrow laceration.  L periorbital ecchymosis.  L elbow/forearm ecchymosis.    Neuro: Pending repeat CT scan to assess for 24 hour stability at 4pm, continue w/q1hr neuro checks until then, neuro exam remains stable  Cardiovascular:   Pulmonary: Breathing comfortably on RA, OOB to chair, PT/OT  Heme: H/H stable, scds, no chemical prophylaxis until 24 hour stability scan  GI: Tolerating diet, patient reports eating prunes at home - encouraged her to have some today  : Voiding, +250  ID: Afebrile, no antibiotics, normal wbc.  Fluids: no IVF  Endocrine/Electrolytes: Lytes adequate  Skin: No rashes  Lines: PIV  Code Status: Full  Ortho: CT suggests possible L radial head fracture.  Splinted per ortho.  Plan for outpatient f/u.  Non WB to the LUE.    Note completed later in day secondary to SICU patient acuity.    To remain in critical care setting w/q1hr neuro checks until repeat stability scan completed.

## 2024-06-11 NOTE — PHYSICAL THERAPY INITIAL EVALUATION ADULT - DIAGNOSIS, PT EVAL
Impaired Functional Mobility due to generalized weakness, (+) Acute right sylvian fissure subarachnoid hemorrhage., left radial head fracture.

## 2024-06-12 LAB
ANION GAP SERPL CALC-SCNC: 12 MMOL/L — SIGNIFICANT CHANGE UP (ref 5–17)
BASOPHILS # BLD AUTO: 0.05 K/UL — SIGNIFICANT CHANGE UP (ref 0–0.2)
BASOPHILS NFR BLD AUTO: 0.8 % — SIGNIFICANT CHANGE UP (ref 0–2)
BUN SERPL-MCNC: 16.5 MG/DL — SIGNIFICANT CHANGE UP (ref 8–20)
CALCIUM SERPL-MCNC: 9.3 MG/DL — SIGNIFICANT CHANGE UP (ref 8.4–10.5)
CHLORIDE SERPL-SCNC: 105 MMOL/L — SIGNIFICANT CHANGE UP (ref 96–108)
CO2 SERPL-SCNC: 25 MMOL/L — SIGNIFICANT CHANGE UP (ref 22–29)
CREAT SERPL-MCNC: 0.3 MG/DL — LOW (ref 0.5–1.3)
EGFR: 109 ML/MIN/1.73M2 — SIGNIFICANT CHANGE UP
EOSINOPHIL # BLD AUTO: 0.67 K/UL — HIGH (ref 0–0.5)
EOSINOPHIL NFR BLD AUTO: 11.2 % — HIGH (ref 0–6)
GLUCOSE SERPL-MCNC: 109 MG/DL — HIGH (ref 70–99)
HCT VFR BLD CALC: 35.5 % — SIGNIFICANT CHANGE UP (ref 34.5–45)
HGB BLD-MCNC: 11.2 G/DL — LOW (ref 11.5–15.5)
IMM GRANULOCYTES NFR BLD AUTO: 0.3 % — SIGNIFICANT CHANGE UP (ref 0–0.9)
LYMPHOCYTES # BLD AUTO: 1.58 K/UL — SIGNIFICANT CHANGE UP (ref 1–3.3)
LYMPHOCYTES # BLD AUTO: 26.4 % — SIGNIFICANT CHANGE UP (ref 13–44)
MAGNESIUM SERPL-MCNC: 2.1 MG/DL — SIGNIFICANT CHANGE UP (ref 1.6–2.6)
MCHC RBC-ENTMCNC: 26.8 PG — LOW (ref 27–34)
MCHC RBC-ENTMCNC: 31.5 GM/DL — LOW (ref 32–36)
MCV RBC AUTO: 84.9 FL — SIGNIFICANT CHANGE UP (ref 80–100)
MONOCYTES # BLD AUTO: 0.7 K/UL — SIGNIFICANT CHANGE UP (ref 0–0.9)
MONOCYTES NFR BLD AUTO: 11.7 % — SIGNIFICANT CHANGE UP (ref 2–14)
NEUTROPHILS # BLD AUTO: 2.97 K/UL — SIGNIFICANT CHANGE UP (ref 1.8–7.4)
NEUTROPHILS NFR BLD AUTO: 49.6 % — SIGNIFICANT CHANGE UP (ref 43–77)
PHOSPHATE SERPL-MCNC: 3.5 MG/DL — SIGNIFICANT CHANGE UP (ref 2.4–4.7)
PLATELET # BLD AUTO: 177 K/UL — SIGNIFICANT CHANGE UP (ref 150–400)
POTASSIUM SERPL-MCNC: 4.1 MMOL/L — SIGNIFICANT CHANGE UP (ref 3.5–5.3)
POTASSIUM SERPL-SCNC: 4.1 MMOL/L — SIGNIFICANT CHANGE UP (ref 3.5–5.3)
RBC # BLD: 4.18 M/UL — SIGNIFICANT CHANGE UP (ref 3.8–5.2)
RBC # FLD: 15.3 % — HIGH (ref 10.3–14.5)
SODIUM SERPL-SCNC: 142 MMOL/L — SIGNIFICANT CHANGE UP (ref 135–145)
WBC # BLD: 5.99 K/UL — SIGNIFICANT CHANGE UP (ref 3.8–10.5)
WBC # FLD AUTO: 5.99 K/UL — SIGNIFICANT CHANGE UP (ref 3.8–10.5)

## 2024-06-12 PROCEDURE — 93010 ELECTROCARDIOGRAM REPORT: CPT

## 2024-06-12 PROCEDURE — 99231 SBSQ HOSP IP/OBS SF/LOW 25: CPT

## 2024-06-12 PROCEDURE — 99223 1ST HOSP IP/OBS HIGH 75: CPT

## 2024-06-12 RX ORDER — ACETAMINOPHEN 500 MG
550 TABLET ORAL ONCE
Refills: 0 | Status: COMPLETED | OUTPATIENT
Start: 2024-06-12 | End: 2024-06-12

## 2024-06-12 RX ORDER — ENOXAPARIN SODIUM 100 MG/ML
30 INJECTION SUBCUTANEOUS EVERY 12 HOURS
Refills: 0 | Status: DISCONTINUED | OUTPATIENT
Start: 2024-06-12 | End: 2024-06-13

## 2024-06-12 RX ORDER — TRAMADOL HYDROCHLORIDE 50 MG/1
25 TABLET ORAL ONCE
Refills: 0 | Status: DISCONTINUED | OUTPATIENT
Start: 2024-06-12 | End: 2024-06-12

## 2024-06-12 RX ADMIN — Medication 650 MILLIGRAM(S): at 14:29

## 2024-06-12 RX ADMIN — ATORVASTATIN CALCIUM 10 MILLIGRAM(S): 80 TABLET, FILM COATED ORAL at 21:23

## 2024-06-12 RX ADMIN — Medication 220 MILLIGRAM(S): at 22:11

## 2024-06-12 RX ADMIN — Medication 650 MILLIGRAM(S): at 13:29

## 2024-06-12 RX ADMIN — Medication 10 MILLIGRAM(S): at 05:16

## 2024-06-12 RX ADMIN — CHLORHEXIDINE GLUCONATE 1 APPLICATION(S): 213 SOLUTION TOPICAL at 21:23

## 2024-06-12 RX ADMIN — PANTOPRAZOLE SODIUM 40 MILLIGRAM(S): 20 TABLET, DELAYED RELEASE ORAL at 05:17

## 2024-06-12 RX ADMIN — FAMOTIDINE 20 MILLIGRAM(S): 10 INJECTION INTRAVENOUS at 05:16

## 2024-06-12 RX ADMIN — AMLODIPINE BESYLATE 5 MILLIGRAM(S): 2.5 TABLET ORAL at 05:16

## 2024-06-12 RX ADMIN — Medication 650 MILLIGRAM(S): at 07:27

## 2024-06-12 RX ADMIN — ESCITALOPRAM OXALATE 10 MILLIGRAM(S): 10 TABLET, FILM COATED ORAL at 12:24

## 2024-06-12 RX ADMIN — ENOXAPARIN SODIUM 30 MILLIGRAM(S): 100 INJECTION SUBCUTANEOUS at 05:16

## 2024-06-12 RX ADMIN — TRAMADOL HYDROCHLORIDE 25 MILLIGRAM(S): 50 TABLET ORAL at 22:30

## 2024-06-12 RX ADMIN — ENOXAPARIN SODIUM 30 MILLIGRAM(S): 100 INJECTION SUBCUTANEOUS at 17:32

## 2024-06-12 RX ADMIN — FAMOTIDINE 20 MILLIGRAM(S): 10 INJECTION INTRAVENOUS at 17:32

## 2024-06-12 RX ADMIN — TRAMADOL HYDROCHLORIDE 25 MILLIGRAM(S): 50 TABLET ORAL at 21:42

## 2024-06-12 RX ADMIN — Medication 550 MILLIGRAM(S): at 22:45

## 2024-06-12 RX ADMIN — Medication 650 MILLIGRAM(S): at 00:58

## 2024-06-12 NOTE — PROGRESS NOTE ADULT - ASSESSMENT
78 yo F with a PMHx of HTN, HLD, and DM who presented following a mechanical fall with SAH, L radial head fx, and forehead laceration.  She's HD stable, with stable SAH on repeat imaging.      PLAN  - Geriatrics consulted  - MARTHA - keppra, q4h neuro checks, and outpatient follow up  - Ortho - NWB LUE and outpatient f/u  - Suture removal in 1 week  - Multimodal pain control  - Encourage IS use  - Multimodal pain control  - PT - home PT  - Dispo: pending geriatrics recs, likely in 1-2 days

## 2024-06-12 NOTE — CONSULT NOTE ADULT - SUBJECTIVE AND OBJECTIVE BOX
78y/o female w ith hx of HTN, HLD, she had mechanical ground level fall , she tripping as she hit her foot with table falling, head strike on wooden floor, as per her she did not loose consciousness, dizziness, lightheadedness, chest pain or SOB, has no neck pain, back pain, hip pain, or lower extremity pain. denies weakness, numbness. Patient takes daily asa. Patietn able to stand and ambulate with assistance after the fall, she was brought in here for further evaluation, imaging done showed SAH, medicine consulted for Ioana Trauma eval     Allergies:  	Sulfacetamide Sodium: Drug, Rash        PAST MEDICAL HISTORY:  High blood pressure     Hyperlipidemia, unspecified hyperlipidemia     Osteoporosis.     PAST SURGICAL HISTORY:  S/P hip replacement.     FAMILY HISTORY:  No pertinent family history. No pertinent family history of: asthma.    Social History:  Not a smoker, drinker or using any drugs           Vital Signs Last 24 Hrs  T(C): 36.3 (12 Jun 2024 07:40), Max: 36.9 (11 Jun 2024 15:55)  T(F): 97.4 (12 Jun 2024 07:40), Max: 98.4 (11 Jun 2024 15:55)  HR: 93 (12 Jun 2024 07:40) (76 - 93)  BP: 121/63 (12 Jun 2024 07:40) (90/75 - 144/87)  BP(mean): 82 (12 Jun 2024 07:00) (71 - 117)  RR: 16 (12 Jun 2024 07:40) (13 - 26)  SpO2: 95% (12 Jun 2024 07:40) (94% - 97%)    Parameters below as of 12 Jun 2024 07:40  Patient On (Oxygen Delivery Method): room air        PHYSICAL EXAM:    GENERAL: Elderly female looking comfortable   HEENT: ecchymosis on forehead  NECK: soft, Supple, No JVD  CHEST/LUNG: Clear to auscultate bilaterally; No wheezing  HEART: S1S2+, Regular rate and rhythm; No murmurs  ABDOMEN: Soft, Nontender, Nondistended; Bowel sounds present  EXTREMITIES:  1+ Peripheral Pulses, No edema  SKIN: No rashes or lesions  NEURO: AAOX3  PSYCH: normal mood      LABS:                        11.2   5.99  )-----------( 177      ( 12 Jun 2024 04:30 )             35.5     06-12    142  |  105  |  16.5  ----------------------------<  109<H>  4.1   |  25.0  |  0.30<L>    Ca    9.3      12 Jun 2024 04:30  Phos  3.5     06-12  Mg     2.1     06-12        I&O's Summary    11 Jun 2024 07:01  -  12 Jun 2024 07:00  --------------------------------------------------------  IN: 240 mL / OUT: 3000 mL / NET: -2760 mL        MEDICATIONS  (STANDING):  amLODIPine   Tablet 5 milliGRAM(s) Oral daily  atorvastatin 10 milliGRAM(s) Oral at bedtime  chlorhexidine 2% Cloths 1 Application(s) Topical daily  enalapril 10 milliGRAM(s) Oral daily  enoxaparin Injectable 30 milliGRAM(s) SubCutaneous every 12 hours  escitalopram 10 milliGRAM(s) Oral daily  famotidine    Tablet 20 milliGRAM(s) Oral two times a day  lidocaine 1%/epinephrine 1:100,000 Inj 10 milliLiter(s) Local Injection Once  pantoprazole    Tablet 40 milliGRAM(s) Oral before breakfast    MEDICATIONS  (PRN):  acetaminophen     Tablet .. 650 milliGRAM(s) Oral every 6 hours PRN Mild Pain (1 - 3)

## 2024-06-12 NOTE — CONSULT NOTE ADULT - ASSESSMENT
80y/o female w ith hx of HTN, HLD, she had mechanical ground level fall , she tripping as she hit her foot with table falling, head strike on wooden floor, as per her she did not loose consciousness, dizziness, lightheadedness, chest pain or SOB, has no neck pain, back pain, hip pain, or lower extremity pain. denies weakness, numbness. Patient takes daily asa. Patietn able to stand and ambulate with assistance after the fall, she was brought in here for further evaluation, imaging done showed SAH, medicine consulted for Ioana Trauma eval     Plan:    Fall:      78y/o female w ith hx of HTN, HLD, she had mechanical ground level fall , she tripping as she hit her foot with table falling, head strike on wooden floor, as per her she did not loose consciousness, dizziness, lightheadedness, chest pain or SOB, has no neck pain, back pain, hip pain, or lower extremity pain. denies weakness, numbness. Patient takes daily asa. Patietn able to stand and ambulate with assistance after the fall, she was brought in here for further evaluation, imaging done showed SAH, medicine consulted for Ioana Trauma eval     Plan:    Fall:     Fall precaution  PT and OT eval    SAH:   Neuro checks  Serial imaging   seen by Neurosurgery  Keppra 250mg BID for seizure ppx- continue until outpatient  Pain control PRN, avoid oversedation  DVT prophylaxis as per primary team      Hx of HTN: will continue with AmLODIPine 5 mg daily, enalapril 10mg daily wuth holding parameters     HLD: will continue with Atorvastatin 10mg at bedtime    Anxiety: will continue with escitalopram 10mg daily.     GERD: will continue with famotidine 20mg two times a day and pantoprazole 40mg before breakfast      Identification of Seniors at Risk:                                                                                                                                       Before the event that brought you to the hospital, did you need someone to help you on a regular basis?   No  In the 24 hours before your injury, have you needed more help than usual?                                                No          Have you been hospitalized for one or more nights during the past six months?                                         No          In general, do you have serious problems with your vision that cannot be corrected with glasses?                No   In general, do you have serious problems with your memory?                                                                    No               Do you take six or more different medications every day?                                                                           Yes                  A positive screen is an answer of yes to 2 or more - Total:  Screening negative     She likely need home safety check  will check EKG     plan as above     medicine team is signing off, please call as needed.

## 2024-06-12 NOTE — PROGRESS NOTE ADULT - SUBJECTIVE AND OBJECTIVE BOX
TRAUMA SURGERY        INTERVAL EVENTS/SUBJECTIVE:   No acute events overnight. Feeling well today and is eager for discharge home.  Denies n/v/d, fevers, chills.    ______________________________________________  OBJECTIVE:   T(C): 36.7 (06-12-24 @ 07:33), Max: 36.9 (06-11-24 @ 15:55)  HR: 79 (06-12-24 @ 07:00) (76 - 86)  BP: 119/68 (06-12-24 @ 07:00) (90/75 - 144/87)  RR: 19 (06-12-24 @ 07:00) (10 - 26)  SpO2: 94% (06-12-24 @ 07:00) (94% - 97%)  Wt(kg): --  CAPILLARY BLOOD GLUCOSE        I&O's Detail    11 Jun 2024 07:01  -  12 Jun 2024 07:00  --------------------------------------------------------  IN:    Oral Fluid: 240 mL  Total IN: 240 mL    OUT:    Voided (mL): 3000 mL  Total OUT: 3000 mL    Total NET: -2760 mL          Physical exam:  Gen: resting in bed comfortably in NAD  Chest: no increased WOB, regular inspiratory effort,   Abdomen: Soft, nontender, nondistended  MSK: L arm in sling  Vascular: WWP, MALONEY x4  NEURO: awake, alert  ______________________________________________  LABS:  CBC Full  -  ( 12 Jun 2024 04:30 )  WBC Count : 5.99 K/uL  RBC Count : 4.18 M/uL  Hemoglobin : 11.2 g/dL  Hematocrit : 35.5 %  Platelet Count - Automated : 177 K/uL  Mean Cell Volume : 84.9 fl  Mean Cell Hemoglobin : 26.8 pg  Mean Cell Hemoglobin Concentration : 31.5 gm/dL  Auto Neutrophil # : 2.97 K/uL  Auto Lymphocyte # : 1.58 K/uL  Auto Monocyte # : 0.70 K/uL  Auto Eosinophil # : 0.67 K/uL  Auto Basophil # : 0.05 K/uL  Auto Neutrophil % : 49.6 %  Auto Lymphocyte % : 26.4 %  Auto Monocyte % : 11.7 %  Auto Eosinophil % : 11.2 %  Auto Basophil % : 0.8 %    06-12    142  |  105  |  16.5  ----------------------------<  109<H>  4.1   |  25.0  |  0.30<L>    Ca    9.3      12 Jun 2024 04:30  Phos  3.5     06-12  Mg     2.1     06-12      _____________________________________________  RADIOLOGY:  < from: CT Elbow No Cont, Left (06.11.24 @ 17:04) >  ACC: [id number] EXAM:  CT ELBOW ONLY LT   ORDERED BY: ELVIRA SEGUNDO     PROCEDURE DATE:  06/11/2024          INTERPRETATION:  HISTORY: Possible left radial head fracture on   radiographs.    Helical CT imaging of the left elbow was performed without intravenous   contrast. Sagittal and coronal reformats were provided. 3-D reformats   were performed on a separate workstation.    Correlation is made with radiographs from same day.    FINDINGS:    There is a posterior plaster splint in place. There is chronic appearing   deformity and offset along the radial head articular surface posteriorly.   There is a questionable superimposed nondisplaced fracture along the   posterior cortex of the radial head. No dislocation. Trace elbow joint   effusion.    There is moderate radiocapitellar and moderate ulnohumeral joint   arthrosis. There is a loose intra-articular body within the anterior   recess of the joint measuring up to 0.7 cm and a loose body within the   posterior recess of the joint measuring up to 0.6 cm. Partially imaged   fixation of the distal radial diametaphysis is seen.    There is soft tissue swelling about the elbow, most prominent over the   olecranon and the dorsum of the forearm. No disproportionate fatty   atrophy of the musculature.    IMPRESSION:    Chronic appearing deformity in offset along the radial head articular   surface posteriorly. Questionable nondisplaced superimposed fracture   along the posterior cortex of the radial head.    Soft tissue swelling about the elbow, most prominent over the olecranon   and dorsum of the forearm. Small elbow joint effusion.    Moderate radiocapitellar and ulnohumeral joint arthrosis. Loose   intra-articular bodies within the anterior and posterior recesses of the   joint.    --- End of Report ---            FELICITAS ALLEN MD; Attending Radiologist  This document has been electronically signed. Jun 11 2024  5:29PM    < end of copied text >    < from: CT Head No Cont (06.11.24 @ 01:37) >    ACC: [id number] EXAM:  CT BRAIN   ORDERED BY: MONIK COOPER     PROCEDURE DATE:  06/11/2024          INTERPRETATION:  CLINICAL INFORMATION: Subarachnoid hemorrhage.    COMPARISON: CT head of 6/10/2024 at 4:55 PM.    PROCEDURE:  Noncontrast CT of the Head was performed from the skull base to the   vertex. Coronal and Sagittal reformats were obtained.    FINDINGS:    Redemonstration of right sylvian fissure and left frontal subarachnoid   hemorrhage. No new or increased intracranial hemorrhage. There is no mass   effect, midline shift, extra-axial collection, hydrocephalus, or evidence   of acute vascular territorial infarction. Mild patchy hypodensities   within the periventricular and subcortical white matter, although   nonspecific, likely reflect chronic microvascular disease. Cerebral   volume loss contributes to prominence of the ventricles and sulci.    The visualized paranasal sinuses and mastoid air cells are clear. Status   post bilateral ocular lens replacement. Calvarium is intact.    IMPRESSION:    Stable bilateral subarachnoid hemorrhage.    --- End of Report ---            NICHOLAS MEJÍA MD; Attending Radiologist  This document has been electronically signed. Jun 11 2024  7:08AM    < end of copied text >

## 2024-06-12 NOTE — PROGRESS NOTE ADULT - ATTENDING COMMENTS
79-year-old female s/p mechanical fall with     #R traumatic subarachnoid hemorrhage   - q4 neuro checks   - Keppra BID   - Hold ASA   - C/w DVT ppx e  - PT home     #L radial head fx  -Sling   -NWB   -F/u with Ortho     #Facial lac   - s/p suture repair   - remove sutures in 1 week   - f/u with PCP     #Hyperlipidemia: c/w statin.   #Hypertension: resume home meds   #GERD: c/w PPI and pepcid   #Anxiety: escitalopram 79-year-old female s/p mechanical fall with     #R traumatic subarachnoid hemorrhage   - q4 neuro checks   - Keppra BID   - Hold ASA   - C/w DVT ppx e  - PT/OT home     #L radial head fx  -Sling   -NWB   -F/u with Ortho     #Facial lac   - s/p suture repair   - remove sutures in 1 week   - f/u with PCP     #Hyperlipidemia: c/w statin.   #Hypertension: resume home meds   #GERD: c/w PPI and pepcid   #Anxiety: escitalopram Alert/Awake

## 2024-06-12 NOTE — CHART NOTE - NSCHARTNOTEFT_GEN_A_CORE
SICU TRANSFER NOTE  -----------------------------  ICU Admission Date: 06/10/2024  Transfer Date: 06-12-24 @ 00:35    Admission Diagnosis:   SAH, Forehead Laceration, L Radial Head Fx    Active Problems/injuries: SAH, Forehead Laceration, L Radial Head Fx    Procedures:   6/10: Laceration Repair w/ 6 Prolene Sutures    Consultants:  [ ] Cardiology  [ ] Endocrine  [ ] Infectious Disease  [ ] Medicine  [x]Neurosurgery  [x] Ortho       [x] Weight Bearing Status: NWB L UE  [ ] Palliative       [ ] Advanced Directives:    [ ] Physical Medicine and Rehab       [ ] Disposition :   [ ] Plastics  [ ] Pulmonary  [ ] Urology    Medications  acetaminophen     Tablet .. 650 milliGRAM(s) Oral every 6 hours PRN  amLODIPine   Tablet 5 milliGRAM(s) Oral daily  atorvastatin 10 milliGRAM(s) Oral at bedtime  chlorhexidine 2% Cloths 1 Application(s) Topical daily  enalapril 10 milliGRAM(s) Oral daily  enoxaparin Injectable 30 milliGRAM(s) SubCutaneous every 12 hours  escitalopram 10 milliGRAM(s) Oral daily  famotidine    Tablet 20 milliGRAM(s) Oral two times a day  lidocaine 1%/epinephrine 1:100,000 Inj 10 milliLiter(s) Local Injection Once  pantoprazole    Tablet 40 milliGRAM(s) Oral before breakfast      [x] I attest I have reviewed and reconciled all medications prior to transfer    Assessment:  79 year old woman w/hx of HTN and HLD who was admitted 6/10/24 after she tripped on the leg of a chair and fell with head-strike on wooden floor.  Found to have a traumatic subarachnoid hemorrhage.  Left eyebrow laceration.  L periorbital ecchymosis.  L radial head fx.    Plan:  I have discussed this case with ACS/Trauma upon transfer and all questions regarding ICU course were answered.  The following items are to be followed up:    - Neuro: 24 hour CTH stable, okay for q4 neuro checks, rescan if change in mental status, currently GCS 15. Continue 250mg BID keppra until outpatient follow up. F/U Dr. Bolden in 2 weeks.   - Cards: Continue home meds, hold ASA until cleared by neuro sx  - Pulm: Maintain sPO2 >92%  - GI: DASH, monitor BMs  - : Voiding, monitor UOP  - Endo: maintain euglycemia  - Heme: SCDs, starting lovenox 6/12, hold ASA until cleared by neuro sx  - Skin: Will need suture removal, 6 prolene sutures placed 6/10  - Ortho: L radial head fx, splinted, NWB. Sling for comfort. Follow up Dr. Willson outpatient.       Tertiary [x]  Geriatric Consult [PENDING]  PTSD [N/A]   [facility] [6/10/24]   CODE STATUS: FULL CODE
SICU TRANSFER NOTE  -----------------------------  ICU Admission Date: 06/10/2024  Transfer Date: 06-12-24 @ 00:35    Admission Diagnosis:   SAH, Forehead Laceration, L Radial Head Fx    Active Problems/injuries: SAH, Forehead Laceration, L Radial Head Fx    Procedures:   6/10: Laceration Repair w/ 6 Prolene Sutures    Consultants:  [ ] Cardiology  [ ] Endocrine  [ ] Infectious Disease  [ ] Medicine  [x]Neurosurgery  [x] Ortho       [x] Weight Bearing Status: NWB L UE  [ ] Palliative       [ ] Advanced Directives:    [ ] Physical Medicine and Rehab       [ ] Disposition :   [ ] Plastics  [ ] Pulmonary  [ ] Urology    Medications  acetaminophen     Tablet .. 650 milliGRAM(s) Oral every 6 hours PRN  amLODIPine   Tablet 5 milliGRAM(s) Oral daily  atorvastatin 10 milliGRAM(s) Oral at bedtime  chlorhexidine 2% Cloths 1 Application(s) Topical daily  enalapril 10 milliGRAM(s) Oral daily  enoxaparin Injectable 30 milliGRAM(s) SubCutaneous every 12 hours  escitalopram 10 milliGRAM(s) Oral daily  famotidine    Tablet 20 milliGRAM(s) Oral two times a day  lidocaine 1%/epinephrine 1:100,000 Inj 10 milliLiter(s) Local Injection Once  pantoprazole    Tablet 40 milliGRAM(s) Oral before breakfast      [x] I attest I have reviewed and reconciled all medications prior to transfer    Assessment:  79F with pmh HTN and HLD s/p mechanical fall with (+) HS on floor with polytrauma including traumatic subarachnoid hemorrhage, left eyebrow laceration, L periorbital ecchymosis, and L radial head fx. HD stable, effective pain control.     Plan:  I have discussed this case with SICU HOMERO Jimenez upon transfer and all questions regarding ICU course were answered.  The following items are to be followed up:    - Neuro: 24 hour CTH stable, okay for q4 neuro checks, rescan if change in mental status, currently GCS 15. Continue 250mg BID keppra until outpatient follow up. F/U Dr. Bolden in 2 weeks.   - Cards: Continue home meds, hold ASA until cleared by neuro sx  - Pulm: Maintain sPO2 >92%  - GI: DASH, monitor BMs  - : Voiding, monitor UOP  - Endo: maintain euglycemia  - Heme: SCDs, starting lovenox 6/12, hold ASA until cleared by neuro sx  - Skin: Will need suture removal, 6 prolene sutures placed 6/10  - Ortho: L radial head fx, splinted, NWB. Sling for comfort. Follow up Dr. Willson outpatient.   - PT: home with PT     Tertiary [x]  Geriatric Consult [PENDING]  PTSD [N/A]   [facility] [6/10/24]   CODE STATUS: FULL CODE.
Tertiary Trauma Survey (TTS)    Date of TTS: 06-10-24 @ 13:09                             Admit Date: 06-10-24 @ 02:01      Trauma Activation:    List Injuries Identified to Date:  1. Subarachnoid hemorrhage  2. Forehead laceration    List Operative and Interventional Radiological Procedures:   N/A    Physical Exam:    Neuro: A&O x3, no focal deficits, comfortable    HEENT: +Left forehead laceration with sutures in place, Left periorbital ecchymosis    Pulm/Chest: Good effort, CTA b/l    Cardiac: RRR    GI / Abdomen: Soft, nontender, nondistended    Musculoskeletal / Extremities: Moves all extremities, full range of motion, +L elbow tenderness/ecchymosis    Integumentary: Warm, dry, intact      RADIOLOGICAL FINDINGS REVIEW:  06-09-24 @1946: XR L elbow  Results pending    06-10-24 @0041: CT Head  IMPRESSION: Acute right sylvian fissure subarachnoid hemorrhage. No calvarial fracture.    06-10-24 @0237: XR Chest  IMPRESSION: Clear lungs. No acute displaced fracture is seen.    06-10-24 @0241: XR Pelvis  IMPRESSION: No acute finding or change.    06-10-24 @0301: CT Angio Head/Neck  IMPRESSION:  Noncontrast CT Head: Bilateral sylvian fissure subarachnoid hemorrhage, slightly more conspicuous on the left since most recent examination. No midline shift or hydrocephalus.  CTA Neck: No significant flow-limiting stenosis or evidence of acute dissection within the cervical carotid or vertebral arteries.  CTA Head: No proximal large vessel occlusion, significant stenosis, or evidence of intracranial aneurysm.    06-10-24 @0951: CT Head  Impression: Subarachnoid hemorrhage is again seen with new areas identified as well.    INCIDENTAL FINDINGS:    [X] No    [ ] Yes, Findings are:        [X] Tertiary exam complete, there are no new injuries identified    [ ] Tertiary exam done, new injuries identified are:                [ ] Imaging ordered:

## 2024-06-13 ENCOUNTER — TRANSCRIPTION ENCOUNTER (OUTPATIENT)
Age: 79
End: 2024-06-13

## 2024-06-13 VITALS
HEART RATE: 80 BPM | DIASTOLIC BLOOD PRESSURE: 62 MMHG | RESPIRATION RATE: 18 BRPM | SYSTOLIC BLOOD PRESSURE: 109 MMHG | OXYGEN SATURATION: 95 % | TEMPERATURE: 98 F

## 2024-06-13 LAB
ANION GAP SERPL CALC-SCNC: 9 MMOL/L — SIGNIFICANT CHANGE UP (ref 5–17)
BASOPHILS # BLD AUTO: 0.04 K/UL — SIGNIFICANT CHANGE UP (ref 0–0.2)
BASOPHILS NFR BLD AUTO: 0.9 % — SIGNIFICANT CHANGE UP (ref 0–2)
BUN SERPL-MCNC: 22.6 MG/DL — HIGH (ref 8–20)
CALCIUM SERPL-MCNC: 9.1 MG/DL — SIGNIFICANT CHANGE UP (ref 8.4–10.5)
CHLORIDE SERPL-SCNC: 103 MMOL/L — SIGNIFICANT CHANGE UP (ref 96–108)
CO2 SERPL-SCNC: 26 MMOL/L — SIGNIFICANT CHANGE UP (ref 22–29)
CREAT SERPL-MCNC: 0.32 MG/DL — LOW (ref 0.5–1.3)
EGFR: 108 ML/MIN/1.73M2 — SIGNIFICANT CHANGE UP
EOSINOPHIL # BLD AUTO: 0.6 K/UL — HIGH (ref 0–0.5)
EOSINOPHIL NFR BLD AUTO: 12.9 % — HIGH (ref 0–6)
GLUCOSE SERPL-MCNC: 110 MG/DL — HIGH (ref 70–99)
HCT VFR BLD CALC: 33.8 % — LOW (ref 34.5–45)
HGB BLD-MCNC: 10.8 G/DL — LOW (ref 11.5–15.5)
IMM GRANULOCYTES NFR BLD AUTO: 0.2 % — SIGNIFICANT CHANGE UP (ref 0–0.9)
LYMPHOCYTES # BLD AUTO: 1.45 K/UL — SIGNIFICANT CHANGE UP (ref 1–3.3)
LYMPHOCYTES # BLD AUTO: 31.1 % — SIGNIFICANT CHANGE UP (ref 13–44)
MAGNESIUM SERPL-MCNC: 2.1 MG/DL — SIGNIFICANT CHANGE UP (ref 1.6–2.6)
MCHC RBC-ENTMCNC: 27 PG — SIGNIFICANT CHANGE UP (ref 27–34)
MCHC RBC-ENTMCNC: 32 GM/DL — SIGNIFICANT CHANGE UP (ref 32–36)
MCV RBC AUTO: 84.5 FL — SIGNIFICANT CHANGE UP (ref 80–100)
MONOCYTES # BLD AUTO: 0.61 K/UL — SIGNIFICANT CHANGE UP (ref 0–0.9)
MONOCYTES NFR BLD AUTO: 13.1 % — SIGNIFICANT CHANGE UP (ref 2–14)
NEUTROPHILS # BLD AUTO: 1.95 K/UL — SIGNIFICANT CHANGE UP (ref 1.8–7.4)
NEUTROPHILS NFR BLD AUTO: 41.8 % — LOW (ref 43–77)
PHOSPHATE SERPL-MCNC: 3.9 MG/DL — SIGNIFICANT CHANGE UP (ref 2.4–4.7)
PLATELET # BLD AUTO: 170 K/UL — SIGNIFICANT CHANGE UP (ref 150–400)
POTASSIUM SERPL-MCNC: 4 MMOL/L — SIGNIFICANT CHANGE UP (ref 3.5–5.3)
POTASSIUM SERPL-SCNC: 4 MMOL/L — SIGNIFICANT CHANGE UP (ref 3.5–5.3)
RBC # BLD: 4 M/UL — SIGNIFICANT CHANGE UP (ref 3.8–5.2)
RBC # FLD: 15.3 % — HIGH (ref 10.3–14.5)
SODIUM SERPL-SCNC: 138 MMOL/L — SIGNIFICANT CHANGE UP (ref 135–145)
WBC # BLD: 4.66 K/UL — SIGNIFICANT CHANGE UP (ref 3.8–10.5)
WBC # FLD AUTO: 4.66 K/UL — SIGNIFICANT CHANGE UP (ref 3.8–10.5)

## 2024-06-13 PROCEDURE — 87641 MR-STAPH DNA AMP PROBE: CPT

## 2024-06-13 PROCEDURE — 70450 CT HEAD/BRAIN W/O DYE: CPT | Mod: MC

## 2024-06-13 PROCEDURE — 80053 COMPREHEN METABOLIC PANEL: CPT

## 2024-06-13 PROCEDURE — 99231 SBSQ HOSP IP/OBS SF/LOW 25: CPT | Mod: FS

## 2024-06-13 PROCEDURE — 83735 ASSAY OF MAGNESIUM: CPT

## 2024-06-13 PROCEDURE — 73080 X-RAY EXAM OF ELBOW: CPT

## 2024-06-13 PROCEDURE — 70498 CT ANGIOGRAPHY NECK: CPT | Mod: MC

## 2024-06-13 PROCEDURE — 82962 GLUCOSE BLOOD TEST: CPT

## 2024-06-13 PROCEDURE — 86901 BLOOD TYPING SEROLOGIC RH(D): CPT

## 2024-06-13 PROCEDURE — 80048 BASIC METABOLIC PNL TOTAL CA: CPT

## 2024-06-13 PROCEDURE — 86850 RBC ANTIBODY SCREEN: CPT

## 2024-06-13 PROCEDURE — 36415 COLL VENOUS BLD VENIPUNCTURE: CPT

## 2024-06-13 PROCEDURE — 71045 X-RAY EXAM CHEST 1 VIEW: CPT

## 2024-06-13 PROCEDURE — 86900 BLOOD TYPING SEROLOGIC ABO: CPT

## 2024-06-13 PROCEDURE — 85730 THROMBOPLASTIN TIME PARTIAL: CPT

## 2024-06-13 PROCEDURE — 99285 EMERGENCY DEPT VISIT HI MDM: CPT

## 2024-06-13 PROCEDURE — 73200 CT UPPER EXTREMITY W/O DYE: CPT | Mod: MC

## 2024-06-13 PROCEDURE — 72170 X-RAY EXAM OF PELVIS: CPT

## 2024-06-13 PROCEDURE — 90471 IMMUNIZATION ADMIN: CPT

## 2024-06-13 PROCEDURE — 70496 CT ANGIOGRAPHY HEAD: CPT | Mod: MC

## 2024-06-13 PROCEDURE — 85025 COMPLETE CBC W/AUTO DIFF WBC: CPT

## 2024-06-13 PROCEDURE — 90715 TDAP VACCINE 7 YRS/> IM: CPT

## 2024-06-13 PROCEDURE — 93005 ELECTROCARDIOGRAM TRACING: CPT

## 2024-06-13 PROCEDURE — 84100 ASSAY OF PHOSPHORUS: CPT

## 2024-06-13 PROCEDURE — 85610 PROTHROMBIN TIME: CPT

## 2024-06-13 PROCEDURE — 87640 STAPH A DNA AMP PROBE: CPT

## 2024-06-13 RX ORDER — MELOXICAM 15 MG/1
1 TABLET ORAL
Refills: 0 | DISCHARGE

## 2024-06-13 RX ORDER — ACETAMINOPHEN 500 MG
2 TABLET ORAL
Qty: 0 | Refills: 0 | DISCHARGE
Start: 2024-06-13

## 2024-06-13 RX ORDER — LEVETIRACETAM 250 MG/1
250 TABLET, FILM COATED ORAL
Refills: 0 | Status: DISCONTINUED | OUTPATIENT
Start: 2024-06-13 | End: 2024-06-13

## 2024-06-13 RX ORDER — LEVETIRACETAM 250 MG/1
1 TABLET, FILM COATED ORAL
Qty: 28 | Refills: 0
Start: 2024-06-13

## 2024-06-13 RX ORDER — ACETAMINOPHEN 500 MG
650 TABLET ORAL EVERY 6 HOURS
Refills: 0 | Status: DISCONTINUED | OUTPATIENT
Start: 2024-06-13 | End: 2024-06-13

## 2024-06-13 RX ADMIN — PANTOPRAZOLE SODIUM 40 MILLIGRAM(S): 20 TABLET, DELAYED RELEASE ORAL at 05:12

## 2024-06-13 RX ADMIN — Medication 650 MILLIGRAM(S): at 15:43

## 2024-06-13 RX ADMIN — LEVETIRACETAM 250 MILLIGRAM(S): 250 TABLET, FILM COATED ORAL at 17:10

## 2024-06-13 RX ADMIN — Medication 650 MILLIGRAM(S): at 16:43

## 2024-06-13 RX ADMIN — AMLODIPINE BESYLATE 5 MILLIGRAM(S): 2.5 TABLET ORAL at 05:12

## 2024-06-13 RX ADMIN — FAMOTIDINE 20 MILLIGRAM(S): 10 INJECTION INTRAVENOUS at 05:12

## 2024-06-13 RX ADMIN — ESCITALOPRAM OXALATE 10 MILLIGRAM(S): 10 TABLET, FILM COATED ORAL at 12:19

## 2024-06-13 RX ADMIN — Medication 10 MILLIGRAM(S): at 05:12

## 2024-06-13 RX ADMIN — ENOXAPARIN SODIUM 30 MILLIGRAM(S): 100 INJECTION SUBCUTANEOUS at 05:11

## 2024-06-13 RX ADMIN — ENOXAPARIN SODIUM 30 MILLIGRAM(S): 100 INJECTION SUBCUTANEOUS at 17:10

## 2024-06-13 NOTE — OCCUPATIONAL THERAPY INITIAL EVALUATION ADULT - VISUAL ACUITY
+reading/distance glasses; no complaints in vision offered. Central and peripheral fields intact bilaterally

## 2024-06-13 NOTE — PROGRESS NOTE ADULT - ASSESSMENT
80 yo F with a PMHx of HTN, HLD, and DM who presented following a mechanical fall with SAH, L radial head fx, and forehead laceration.  She's HD stable, with stable SAH on repeat imaging.      PLAN  - Geriatrics consulted - continuing home meds, signed off  - NSGY - keppra, q4h neuro checks, and outpatient follow up  - Ortho - NWB LUE and outpatient f/u, sling for comfort  - Suture removal in 1 week  - Multimodal pain control  - Encourage IS use  - Multimodal pain control  - PT - home PT  - Dispo: pending OT royal

## 2024-06-13 NOTE — DIETITIAN NUTRITION RISK NOTIFICATION - TREATMENT: THE FOLLOWING DIET HAS BEEN RECOMMENDED
Diet, DASH/TLC:   Sodium & Cholesterol Restricted  Supplement Feeding Modality:  Oral  Ensure Enlive Cans or Servings Per Day:  1       Frequency:  Three Times a day (06-10-24 @ 05:07) [Active]

## 2024-06-13 NOTE — PROGRESS NOTE ADULT - SUBJECTIVE AND OBJECTIVE BOX
Patient seen and examined at bedside. No acute events overnight, no complaints    Vitals:  Vital Signs Last 24 Hrs  T(C): 36.3 (13 Jun 2024 09:00), Max: 37.1 (12 Jun 2024 17:00)  T(F): 97.4 (13 Jun 2024 09:00), Max: 98.7 (12 Jun 2024 17:00)  HR: 78 (13 Jun 2024 09:00) (78 - 96)  BP: 109/66 (13 Jun 2024 09:00) (96/61 - 127/78)  BP(mean): --  RR: 18 (13 Jun 2024 09:00) (16 - 18)  SpO2: 91% (13 Jun 2024 09:00) (91% - 97%)    Parameters below as of 13 Jun 2024 09:00  Patient On (Oxygen Delivery Method): room air        Labs:  06-13    138  |  103  |  22.6<H>  ----------------------------<  110<H>  4.0   |  26.0  |  0.32<L>    Ca    9.1      13 Jun 2024 05:34  Phos  3.9     06-13  Mg     2.1     06-13                              10.8   4.66  )-----------( 170      ( 13 Jun 2024 05:34 )             33.8       Exam:  Gen: pt lying in bed, alert, in NAD, L eyebrow lac c/d/i  Resp: unlabored  CVS: RRR  Abd: soft, NT, ND  Ext: moving all extremities spontaneously, sensation intact, pulses 2+

## 2024-06-13 NOTE — DIETITIAN INITIAL EVALUATION ADULT - OTHER INFO
78 yo F with a PMHx of HTN, HLD, and DM who presented following a mechanical fall with SAH, L radial head fx, and forehead laceration.  She's HD stable, with stable SAH on repeat imaging.

## 2024-06-13 NOTE — OCCUPATIONAL THERAPY INITIAL EVALUATION ADULT - ADDITIONAL COMMENTS
Pt has a tub with curtains and grab bar. Pt owns a RW, cane, commode and sock aide. Pt is right handed.

## 2024-06-13 NOTE — DISCHARGE NOTE NURSING/CASE MANAGEMENT/SOCIAL WORK - PATIENT PORTAL LINK FT
You can access the FollowMyHealth Patient Portal offered by [facility] by registering at the following website: [email or web address]. By joining [facility]’s FollowMyHealth portal, you will also be able to view your health information using other applications (apps) compatible with our system.

## 2024-06-13 NOTE — DIETITIAN INITIAL EVALUATION ADULT - PERTINENT LABORATORY DATA
06-13    138  |  103  |  22.6<H>  ----------------------------<  110<H>  4.0   |  26.0  |  0.32<L>    Ca    9.1      13 Jun 2024 05:34  Phos  3.9     06-13  Mg     2.1     06-13

## 2024-06-13 NOTE — OCCUPATIONAL THERAPY INITIAL EVALUATION ADULT - DIAGNOSIS, OT EVAL
79 year old Female with PMHx of HTN, HLD, and DM who presented following a mechanical fall after she tripped on the leg of a chair, +HS, with traumatic SAH, L eyebrow laceration, L periorbital ecchymosis, L radial head fx, and forehead laceration. She's HD stable, with stable SAH on repeat imaging.

## 2024-06-13 NOTE — DIETITIAN INITIAL EVALUATION ADULT - ETIOLOGY
related to inability to meet sufficient protein-energy in setting of lack of appetite, SAH, L radial head fx

## 2024-06-13 NOTE — DIETITIAN INITIAL EVALUATION ADULT - PERTINENT MEDS FT
MEDICATIONS  (STANDING):  atorvastatin 10 milliGRAM(s) Oral at bedtime  enalapril 10 milliGRAM(s) Oral daily  escitalopram 10 milliGRAM(s) Oral daily  famotidine    Tablet 20 milliGRAM(s) Oral two times a day  levETIRAcetam 250 milliGRAM(s) Oral two times a day  pantoprazole    Tablet 40 milliGRAM(s) Oral before breakfast

## 2024-06-13 NOTE — DIETITIAN NUTRITION RISK NOTIFICATION - ADDITIONAL COMMENTS/DIETITIAN RECOMMENDATIONS
Continue Ensure Plus High Protein TID (350 kcal, 20g protein per serving).  Rx: MVI daily. Encourage po intake, monitor diet tolerance, and provide assistance at meals as needed. Obtain daily weights to monitor trends.

## 2024-06-13 NOTE — OCCUPATIONAL THERAPY INITIAL EVALUATION ADULT - GENERAL OBSERVATIONS, REHAB EVAL
Left pt as received OOB in chair, NAD, +IV lock, +LUE ace wrap on RA A&Ox4. Pre/post pain level 6/10, headache; RN aware, pt requesting pain meds. Pt agreeable to OT evaluation

## 2024-06-13 NOTE — DISCHARGE NOTE NURSING/CASE MANAGEMENT/SOCIAL WORK - NSDCVIVACCINE_GEN_ALL_CORE_FT
Tdap; 09-Jun-2024 20:32; Linh Gr (RN); [employer]; [id number] (Exp. Date: 31-Dec-2025); IntraMuscular; Deltoid Left.; 0.5 milliLiter(s); VIS (VIS Published: 09-May-2013, VIS Presented: 09-Jun-2024);

## 2024-06-13 NOTE — OCCUPATIONAL THERAPY INITIAL EVALUATION ADULT - LIVES WITH, PROFILE
Pt reports lives with son in a house with 2 YAIR with L handrail, 4 steps inside with R handrail to bed/bath. Pt states son can assist prn./children

## 2024-06-13 NOTE — PROGRESS NOTE ADULT - ATTENDING COMMENTS
79-year-old female s/p mechanical fall with     #R traumatic subarachnoid hemorrhage   - q4 neuro checks   - Keppra BID   - Hold ASA   - C/w DVT ppx   - PT/OT home   - Discharge planning    #L radial head fx  -Sling   -NWB   -F/u with Ortho     #Facial lac   - s/p suture repair   - remove sutures in 1 week   - f/u with PCP     #Hyperlipidemia: c/w statin.   #Hypertension: resume home meds   #GERD: c/w PPI and pepcid   #Anxiety: escitalopram

## 2024-06-13 NOTE — DIETITIAN INITIAL EVALUATION ADULT - ORAL INTAKE PTA/DIET HISTORY
Nutrition assessment completed. Pt reports fair appetite/po intake. Denies chewing/swallowing difficulty. States she had surgery in early spring and lost ~6 lbs. Pt states she is drinking Ensure supplement between lunch and dinner to optimize intake. Encouraged HBV protein sources. RD to follow up as feasible.

## 2024-06-15 ENCOUNTER — NON-APPOINTMENT (OUTPATIENT)
Age: 79
End: 2024-06-15

## 2024-06-20 ENCOUNTER — APPOINTMENT (OUTPATIENT)
Dept: TRAUMA SURGERY | Facility: CLINIC | Age: 79
End: 2024-06-20
Payer: MEDICARE

## 2024-06-20 VITALS
TEMPERATURE: 98 F | BODY MASS INDEX: 15.64 KG/M2 | DIASTOLIC BLOOD PRESSURE: 74 MMHG | OXYGEN SATURATION: 98 % | WEIGHT: 85 LBS | HEIGHT: 62 IN | SYSTOLIC BLOOD PRESSURE: 123 MMHG | RESPIRATION RATE: 16 BRPM | HEART RATE: 84 BPM

## 2024-06-20 DIAGNOSIS — S52.123A DISPLACED FRACTURE OF HEAD OF UNSPECIFIED RADIUS, INITIAL ENCOUNTER FOR CLOSED FRACTURE: ICD-10-CM

## 2024-06-20 DIAGNOSIS — S01.01XS LACERATION W/OUT FOREIGN BODY OF SCALP, SEQUELA: ICD-10-CM

## 2024-06-20 DIAGNOSIS — S00.83XS CONTUSION OF OTHER PART OF HEAD, SEQUELA: ICD-10-CM

## 2024-06-20 PROCEDURE — 99213 OFFICE O/P EST LOW 20 MIN: CPT

## 2024-06-21 PROBLEM — S01.01XS LACERATION OF SCALP, SEQUELA: Status: ACTIVE | Noted: 2024-06-21

## 2024-06-21 PROBLEM — S52.123A RADIAL HEAD FRACTURE, CLOSED: Status: ACTIVE | Noted: 2024-06-21

## 2024-06-21 PROBLEM — S00.83XS: Status: ACTIVE | Noted: 2024-06-21

## 2024-06-21 NOTE — REVIEW OF SYSTEMS
[Confused] : no confusion [Convulsions] : no convulsions [Dizziness] : dizziness [Fainting] : no fainting [Negative] : Psychiatric [FreeTextEntry9] : left   radial head fracture [de-identified] : ecchymosis  to  left  side  of  face with laceration [de-identified] : SAH

## 2024-06-21 NOTE — ASSESSMENT
[FreeTextEntry1] : RTC prn  F/u Neurosurgery /Orthopedics/PMD as instructed  Discussion with patient   All questions answered  Any acute symptoms and or concerns patient understands  to call back office  and  or  go  directly to [facility] ED

## 2024-06-21 NOTE — PHYSICAL EXAM
[Abdomen Tenderness] : ~T ~M No abdominal tenderness [Purpura] : purpura [Petechiae] : no petechiae [Skin Ulcer] : no ulcer [Skin Induration] : no induration [Alert] : alert [Oriented to Person] : oriented to person [Oriented to Place] : oriented to place [Oriented to Time] : oriented to time [Calm] : calm [de-identified] : wdwn thin female  in  nad [de-identified] : PERRLA EOMS intact    eccymosis  to  left  side  of  face    skin intact  laceration  is  c/d/i   no  lesions   [de-identified] : trachea  midline [de-identified] : soft  no  guarding [de-identified] : able  to weight  bear  and  ambulate unassisted   left arm in  splint [de-identified] : left  side  of  face     skin intact   no  necrosis of  skin

## 2024-06-21 NOTE — HISTORY OF PRESENT ILLNESS
[FreeTextEntry1] : Patient JENNI ALLAN MRN [id number] Hospital Visit [id number] [facility] - Attending Physician Han Nelson  Status Complete      Hospital Course:  Discharge Date 13-Jun-2024  Admission Date 10-Manish-2024 02:01  Reason for Admission [facility]  Hospital Course   Admission H&P (Copied): 79y female w/ pmh of HTN, HLD presenting after a  mechanical ground level fall occurring 30 min PTA. Pt reported tripping and  falling, + head strike on wooden floor, denies LOC, dizziness, lightheadedness,  chest pain or SOB that may  have contributed. denies neck pain, back pain, hip  pain, or lower extremity pain. denies weakness, numbness. Patient takes daily  asa. Patient able to stand and ambulate with assistance after the fall.    Hospital Course: Patient's laceration was repaired in the trauma bay, tolerated  procedure well. Patient was admitted to SICU for Q1H neuro-checks d/t diagnosis  of subarachnoid hemorrhage. Serial head CT scans ordered, 6 hr scan revealed  worsened bleed. However, repeat scans x5 revealed stable bleed. Neurosurgery  consulted and recommended to hold Aspirin until outpatient follow up,  additionally Keppra 250mg bid until outpatient follow up. Orthopedics also  consulted after patient was found to have L radial fx, treated with  non-operative management of splint and non-weight bearing status. Patient was  provided outpatient follow up instructions with Dr. Lowry in 2 weeks following  discharge. A tertiary examination was performed on 6/10, which did not reveal  any new acute injuries. On 6/11 patient was evaluated by physical therapy who  recommended home with PT services. On 6/12 patient was downgraded to the  surgical floor from SICU. Geriatrics saw the patient and no needs identified.  Medically stable for discharge home at this time. Patient presents  to ACS   for  recheck  s/.p hospitalization  due  to fall sustaining  multiple injuries  including  SAH contusion to  face  radial head  fracture    Patient  denies  any  additional  falls     Admits  to experiencing  headaches  no  dizziness no blurry vision  Sutures  to laceration removed prior to D/C   Left  arm in a splint  due  to radial head fracture   Patient has  f/u appt with neurosurgery and  orthopedics     Patient  eating   nl bm nl urination  Denies  any  chest  pain  n  abdominal pain  Son at bedside  for  entire exam

## 2024-06-26 DIAGNOSIS — I62.9 NONTRAUMATIC INTRACRANIAL HEMORRHAGE, UNSPECIFIED: ICD-10-CM

## 2024-06-26 RX ORDER — LEVETIRACETAM 250 MG/1
250 TABLET, FILM COATED ORAL TWICE DAILY
Qty: 28 | Refills: 0 | Status: ACTIVE | COMMUNITY
Start: 2024-06-26 | End: 1900-01-01

## 2024-07-01 ENCOUNTER — NON-APPOINTMENT (OUTPATIENT)
Age: 79
End: 2024-07-01

## 2024-07-02 ENCOUNTER — APPOINTMENT (OUTPATIENT)
Dept: GASTROENTEROLOGY | Facility: GI CENTER | Age: 79
End: 2024-07-02

## 2024-07-02 ENCOUNTER — APPOINTMENT (OUTPATIENT)
Dept: NEUROSURGERY | Facility: CLINIC | Age: 79
End: 2024-07-02
Payer: MEDICARE

## 2024-07-02 VITALS
WEIGHT: 85 LBS | HEIGHT: 62.5 IN | HEART RATE: 88 BPM | BODY MASS INDEX: 15.25 KG/M2 | TEMPERATURE: 98 F | SYSTOLIC BLOOD PRESSURE: 127 MMHG | OXYGEN SATURATION: 97 % | DIASTOLIC BLOOD PRESSURE: 69 MMHG
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
## 2024-07-02 DIAGNOSIS — S06.6XAA TRAUMATIC SUBARACHNOID HEMORRHAGE WITH LOSS OF CONSCIOUSNESS STATUS UNKNOWN, INITIAL ENCOUNTER: ICD-10-CM

## 2024-07-02 PROCEDURE — 99212 OFFICE O/P EST SF 10 MIN: CPT

## 2024-07-03 ENCOUNTER — RX RENEWAL (OUTPATIENT)
Age: 79
End: 2024-07-03

## 2024-07-05 ENCOUNTER — NON-APPOINTMENT (OUTPATIENT)
Age: 79
End: 2024-07-05

## 2024-07-09 ENCOUNTER — APPOINTMENT (OUTPATIENT)
Dept: CT IMAGING | Facility: CLINIC | Age: 79
End: 2024-07-09

## 2024-08-01 ENCOUNTER — APPOINTMENT (OUTPATIENT)
Dept: NEUROSURGERY | Facility: CLINIC | Age: 79
End: 2024-08-01
Payer: MEDICARE

## 2024-08-01 VITALS
HEIGHT: 62 IN | TEMPERATURE: 98.2 F | WEIGHT: 88.4 LBS | BODY MASS INDEX: 16.27 KG/M2 | OXYGEN SATURATION: 96 % | SYSTOLIC BLOOD PRESSURE: 105 MMHG | HEART RATE: 82 BPM | DIASTOLIC BLOOD PRESSURE: 65 MMHG

## 2024-08-01 DIAGNOSIS — S06.6XAA TRAUMATIC SUBARACHNOID HEMORRHAGE WITH LOSS OF CONSCIOUSNESS STATUS UNKNOWN, INITIAL ENCOUNTER: ICD-10-CM

## 2024-08-01 PROCEDURE — 99212 OFFICE O/P EST SF 10 MIN: CPT

## 2024-08-01 RX ORDER — MELOXICAM 7.5 MG/1
7.5 TABLET ORAL
Refills: 0 | Status: ACTIVE | COMMUNITY

## 2024-08-01 NOTE — ASSESSMENT
[FreeTextEntry1] : Ms. Emily Sanchez is a very pleasant 79 year old female with a history of HTN, HLD, DM2, recent knee surgery previously on aspirin 81 mg, accompanied by her son, presents for follow up evaluation of traumatic subarachnoid hemorrhage sustained post fall on 6/09/24 that has since resolved.  She is overall doing very well.  I discussed with her that she has no activity restrictions and she does not need any additional imaging from my standpoint.  She is okay for any types of medication that she needs.  I offered her scheduled follow-up but she will let me know if she needs anything. All questions answered.  She knows to call my office with any issues or concerns.

## 2024-08-01 NOTE — HISTORY OF PRESENT ILLNESS
[FreeTextEntry1] : Ms. Emily Sanchez is a very pleasant 79 year old female with a history of HTN, HLD, DM2, recent knee surgery previously on aspirin 81 mg, accompanied by her son, presents for follow up evaluation of traumatic subarachnoid hemorrhage sustained post fall on 6/09/24. Since the last office visit, she has discontinued Keppra. Her headaches have decreased in frequency and intensity; rates headache pain on average 6/10. Currently she rates headache 3/10. She denies any dizziness, hearing issues, nausea or vomiting. She returned to PT for her knee, and it is going well. She takes Meloxicam for pain as needed. She had repeat CT done in [facility].   Hx: HTN, DM2, RBB, RA Left knee revision at [facility] on 2/28/24.

## 2024-08-17 NOTE — ED ADULT NURSE NOTE - CHIEF COMPLAINT QUOTE
[facility] s/p trip and fall hit left side over eyebrow no loc no blood thinners, laceration to over eye on left side and swelling noted to left elbow

## 2024-08-29 ENCOUNTER — APPOINTMENT (OUTPATIENT)
Dept: RHEUMATOLOGY | Facility: CLINIC | Age: 79
End: 2024-08-29
Payer: MEDICARE

## 2024-08-29 VITALS
SYSTOLIC BLOOD PRESSURE: 118 MMHG | TEMPERATURE: 98.6 F | RESPIRATION RATE: 16 BRPM | HEART RATE: 98 BPM | DIASTOLIC BLOOD PRESSURE: 72 MMHG | OXYGEN SATURATION: 98 %

## 2024-08-29 PROCEDURE — 96372 THER/PROPH/DIAG INJ SC/IM: CPT

## 2024-12-03 ENCOUNTER — OFFICE (OUTPATIENT)
Dept: URBAN - METROPOLITAN AREA CLINIC 115 | Facility: CLINIC | Age: 79
Setting detail: OPHTHALMOLOGY
End: 2024-12-03
Payer: MEDICARE

## 2024-12-03 DIAGNOSIS — H40.1412: ICD-10-CM

## 2024-12-03 DIAGNOSIS — H35.033: ICD-10-CM

## 2024-12-03 DIAGNOSIS — H40.1421: ICD-10-CM

## 2024-12-03 DIAGNOSIS — E11.3393: ICD-10-CM

## 2024-12-03 DIAGNOSIS — H25.13: ICD-10-CM

## 2024-12-03 PROBLEM — Z96.1 PSEUDOPHAKIA: Status: ACTIVE | Noted: 2024-12-03

## 2024-12-03 PROCEDURE — 92004 COMPRE OPH EXAM NEW PT 1/>: CPT | Performed by: OPHTHALMOLOGY

## 2024-12-03 ASSESSMENT — REFRACTION_CURRENTRX
OS_VPRISM_DIRECTION: SV
OD_VPRISM_DIRECTION: SV
OD_CYLINDER: -0.75
OD_CYLINDER: -1.00
OD_OVR_VA: 20/
OS_OVR_VA: 20/
OS_SPHERE: +3.25
OS_AXIS: 066
OD_SPHERE: +2.00
OS_CYLINDER: -1.25
OD_VPRISM_DIRECTION: SV
OD_AXIS: 088
OS_AXIS: 074
OD_SPHERE: PLANO
OD_VPRISM_DIRECTION: SV
OS_AXIS: 110
OS_VPRISM_DIRECTION: SV
OD_SPHERE: +2.50
OD_SPHERE: -0.75
OS_AXIS: 108
OS_VPRISM_DIRECTION: SV
OS_OVR_VA: 20/
OS_SPHERE: +0.50
OS_CYLINDER: -1.75
OD_AXIS: 77
OS_OVR_VA: 20/
OS_CYLINDER: -1.50
OS_VPRISM_DIRECTION: SV
OD_OVR_VA: 20/
OS_CYLINDER: -1.75
OD_CYLINDER: -1.00
OD_AXIS: 085
OD_CYLINDER: -0.75
OD_OVR_VA: 20/
OS_SPHERE: +1.50
OD_AXIS: 79
OD_VPRISM_DIRECTION: SV
OS_SPHERE: -1.00

## 2024-12-03 ASSESSMENT — VISUAL ACUITY
OS_BCVA: 20/25-1
OD_BCVA: 20/30-1

## 2024-12-03 ASSESSMENT — REFRACTION_MANIFEST
OD_CYLINDER: -1.25
OD_AXIS: 080
OS_CYLINDER: -2.00
OD_VA1: 20/30
OS_SPHERE: -1.00
OD_CYLINDER: -1.00
OD_SPHERE: +0.50
OS_ADD: +2.50
OD_VA1: 20/40
OD_AXIS: 082
OS_SPHERE: -1.75
OD_SPHERE: -0.25
OS_AXIS: 097
OS_AXIS: 085
OS_VA1: 20/40
OS_VA1: 20/50
OD_ADD: +2.50
OS_CYLINDER: -2.25

## 2024-12-03 ASSESSMENT — REFRACTION_AUTOREFRACTION
OD_AXIS: 098
OD_SPHERE: +0.75
OS_SPHERE: +1.00
OS_CYLINDER: -1.75
OD_CYLINDER: -1.75
OS_AXIS: 088

## 2024-12-03 ASSESSMENT — CONFRONTATIONAL VISUAL FIELD TEST (CVF)
OS_FINDINGS: FULL
OD_FINDINGS: FULL

## 2024-12-03 ASSESSMENT — TONOMETRY
OS_IOP_MMHG: 17
OD_IOP_MMHG: 20

## 2024-12-26 ENCOUNTER — RX RENEWAL (OUTPATIENT)
Age: 79
End: 2024-12-26

## 2025-03-03 ENCOUNTER — APPOINTMENT (OUTPATIENT)
Dept: RHEUMATOLOGY | Facility: CLINIC | Age: 80
End: 2025-03-03

## 2025-03-05 ENCOUNTER — APPOINTMENT (OUTPATIENT)
Dept: RHEUMATOLOGY | Facility: CLINIC | Age: 80
End: 2025-03-05

## 2025-03-17 ENCOUNTER — NON-APPOINTMENT (OUTPATIENT)
Age: 80
End: 2025-03-17

## 2025-03-17 ENCOUNTER — APPOINTMENT (OUTPATIENT)
Dept: RHEUMATOLOGY | Facility: CLINIC | Age: 80
End: 2025-03-17
Payer: MEDICARE

## 2025-03-17 VITALS
HEART RATE: 78 BPM | DIASTOLIC BLOOD PRESSURE: 80 MMHG | WEIGHT: 93 LBS | HEIGHT: 62 IN | RESPIRATION RATE: 17 BRPM | SYSTOLIC BLOOD PRESSURE: 140 MMHG | TEMPERATURE: 98.2 F | OXYGEN SATURATION: 96 % | BODY MASS INDEX: 17.11 KG/M2

## 2025-03-17 DIAGNOSIS — M21.922 UNSPECIFIED ACQUIRED DEFORMITY OF RIGHT UPPER ARM: ICD-10-CM

## 2025-03-17 DIAGNOSIS — Z51.81 ENCOUNTER FOR THERAPEUTIC DRUG LVL MONITORING: ICD-10-CM

## 2025-03-17 DIAGNOSIS — M81.0 AGE-RELATED OSTEOPOROSIS W/OUT CURRENT PATHOLOGICAL FRACTURE: ICD-10-CM

## 2025-03-17 DIAGNOSIS — M25.562 PAIN IN RIGHT KNEE: ICD-10-CM

## 2025-03-17 DIAGNOSIS — Z96.652 PRESENCE OF LEFT ARTIFICIAL KNEE JOINT: ICD-10-CM

## 2025-03-17 DIAGNOSIS — Z79.620 ENCOUNTER FOR THERAPEUTIC DRUG LVL MONITORING: ICD-10-CM

## 2025-03-17 DIAGNOSIS — M15.9 POLYOSTEOARTHRITIS, UNSPECIFIED: ICD-10-CM

## 2025-03-17 DIAGNOSIS — G89.29 PAIN IN RIGHT KNEE: ICD-10-CM

## 2025-03-17 DIAGNOSIS — M25.561 PAIN IN RIGHT KNEE: ICD-10-CM

## 2025-03-17 DIAGNOSIS — Z86.79 PERSONAL HISTORY OF OTHER DISEASES OF THE CIRCULATORY SYSTEM: ICD-10-CM

## 2025-03-17 DIAGNOSIS — R29.898 OTHER SYMPTOMS AND SIGNS INVOLVING THE MUSCULOSKELETAL SYSTEM: ICD-10-CM

## 2025-03-17 DIAGNOSIS — M21.921 UNSPECIFIED ACQUIRED DEFORMITY OF RIGHT UPPER ARM: ICD-10-CM

## 2025-03-17 DIAGNOSIS — M41.9 SCOLIOSIS, UNSPECIFIED: ICD-10-CM

## 2025-03-17 PROCEDURE — 99215 OFFICE O/P EST HI 40 MIN: CPT

## 2025-03-17 PROCEDURE — G2211 COMPLEX E/M VISIT ADD ON: CPT

## 2025-03-17 PROCEDURE — G2212 PROLONG OUTPT/OFFICE VIS: CPT

## 2025-03-22 PROBLEM — Z86.79 HISTORY OF CEREBRAL HEMORRHAGE: Status: RESOLVED | Noted: 2025-03-22 | Resolved: 2025-03-22

## 2025-03-22 RX ORDER — PNV NO.95/FERROUS FUM/FOLIC AC 28MG-0.8MG
100 TABLET ORAL
Refills: 0 | Status: ACTIVE | COMMUNITY

## 2025-03-22 RX ORDER — ESCITALOPRAM OXALATE 10 MG/1
10 TABLET, FILM COATED ORAL
Refills: 0 | Status: ACTIVE | COMMUNITY

## 2025-03-22 RX ORDER — DICLOFENAC SODIUM 10 MG/G
1 GEL TOPICAL
Qty: 3 | Refills: 2 | Status: ACTIVE | COMMUNITY

## 2025-03-22 RX ORDER — MULTIVITAMIN
TABLET ORAL
Refills: 0 | Status: ACTIVE | COMMUNITY

## 2025-03-22 RX ORDER — PANTOPRAZOLE 40 MG/1
40 TABLET, DELAYED RELEASE ORAL
Refills: 0 | Status: ACTIVE | COMMUNITY

## 2025-03-22 RX ORDER — MELOXICAM 7.5 MG/1
7.5 TABLET ORAL
Qty: 90 | Refills: 0 | Status: ACTIVE | COMMUNITY
Start: 2025-03-22 | End: 1900-01-01

## 2025-03-22 RX ORDER — ENALAPRIL MALEATE 10 MG/1
10 TABLET ORAL
Refills: 0 | Status: ACTIVE | COMMUNITY

## 2025-03-22 RX ADMIN — DENOSUMAB 0 MG/ML: 60 INJECTION SUBCUTANEOUS at 00:00

## 2025-03-27 ENCOUNTER — RX RENEWAL (OUTPATIENT)
Age: 80
End: 2025-03-27

## 2025-03-28 LAB
ALBUMIN SERPL ELPH-MCNC: 4.6 G/DL
ALDOLASE SERPL-CCNC: 4.7 U/L
ALP BLD-CCNC: 83 U/L
ALT SERPL-CCNC: 18 U/L
ANA SER IF-ACNC: NEGATIVE
ANION GAP SERPL CALC-SCNC: 17 MMOL/L
APPEARANCE: CLEAR
AST SERPL-CCNC: 27 U/L
BACTERIA: NEGATIVE /HPF
BASOPHILS # BLD AUTO: 0.04 K/UL
BASOPHILS NFR BLD AUTO: 0.6 %
BILIRUB SERPL-MCNC: 0.3 MG/DL
BILIRUBIN URINE: NEGATIVE
BLOOD URINE: NEGATIVE
BUN SERPL-MCNC: 19 MG/DL
CALCIUM OXALATE CRYSTALS: PRESENT
CALCIUM SERPL-MCNC: 10.3 MG/DL
CAST: 3 /LPF
CCP AB SER IA-ACNC: <8 U/ML
CHLORIDE SERPL-SCNC: 104 MMOL/L
CK SERPL-CCNC: 124 U/L
CO2 SERPL-SCNC: 24 MMOL/L
COLOR: NORMAL
CREAT SERPL-MCNC: 0.48 MG/DL
CRP SERPL-MCNC: <3 MG/L
EGFRCR SERPLBLD CKD-EPI 2021: 97 ML/MIN/1.73M2
ENA SS-A AB SER IA-ACNC: <0.2 AL
ENA SS-B AB SER IA-ACNC: <0.2 AL
EOSINOPHIL # BLD AUTO: 0.2 K/UL
EOSINOPHIL NFR BLD AUTO: 2.9 %
EPITHELIAL CELLS: 1 /HPF
ERYTHROCYTE [SEDIMENTATION RATE] IN BLOOD BY WESTERGREN METHOD: 8 MM/HR
FOLATE SERPL-MCNC: >20 NG/ML
GLUCOSE QUALITATIVE U: NEGATIVE MG/DL
GLUCOSE SERPL-MCNC: 131 MG/DL
HCT VFR BLD CALC: 39 %
HGB BLD-MCNC: 12.1 G/DL
IMM GRANULOCYTES NFR BLD AUTO: 0.4 %
KETONES URINE: NEGATIVE MG/DL
LDH SERPL-CCNC: 244 U/L
LEUKOCYTE ESTERASE URINE: ABNORMAL
LYMPHOCYTES # BLD AUTO: 1.17 K/UL
LYMPHOCYTES NFR BLD AUTO: 17 %
MAGNESIUM SERPL-MCNC: 2.1 MG/DL
MAN DIFF?: NORMAL
MCHC RBC-ENTMCNC: 27.4 PG
MCHC RBC-ENTMCNC: 31 G/DL
MCV RBC AUTO: 88.2 FL
MICROSCOPIC-UA: NORMAL
MONOCYTES # BLD AUTO: 0.63 K/UL
MONOCYTES NFR BLD AUTO: 9.1 %
NEUTROPHILS # BLD AUTO: 4.83 K/UL
NEUTROPHILS NFR BLD AUTO: 70 %
NITRITE URINE: NEGATIVE
PH URINE: 5.5
PHOSPHATE SERPL-MCNC: 3.7 MG/DL
PLATELET # BLD AUTO: 200 K/UL
POTASSIUM SERPL-SCNC: 3.8 MMOL/L
PROT SERPL-MCNC: 6.8 G/DL
PROTEIN URINE: NEGATIVE MG/DL
RBC # BLD: 4.42 M/UL
RBC # FLD: 14.6 %
RED BLOOD CELLS URINE: 3 /HPF
REVIEW: NORMAL
RF+CCP IGG SER-IMP: NEGATIVE
RHEUMATOID FACT SER QL: <10 IU/ML
SODIUM SERPL-SCNC: 146 MMOL/L
SPECIFIC GRAVITY URINE: 1.02
T PALLIDUM AB SER QL IA: NEGATIVE
TSH SERPL-ACNC: 1.44 UIU/ML
TTG IGA SER IA-ACNC: <0.5 U/ML
TTG IGA SER-ACNC: NEGATIVE
TTG IGG SER IA-ACNC: <0.8 U/ML
TTG IGG SER IA-ACNC: NEGATIVE
UROBILINOGEN URINE: 0.2 MG/DL
VIT B12 SERPL-MCNC: 437 PG/ML
WBC # FLD AUTO: 6.9 K/UL
WHITE BLOOD CELLS URINE: 3 /HPF

## 2025-03-29 LAB
ALBUMIN MFR SERPL ELPH: 66.9 %
ALBUMIN SERPL-MCNC: 4.5 G/DL
ALBUMIN/GLOB SERPL: 2 RATIO
ALPHA1 GLOB MFR SERPL ELPH: 3.3 %
ALPHA1 GLOB SERPL ELPH-MCNC: 0.2 G/DL
ALPHA2 GLOB MFR SERPL ELPH: 10 %
ALPHA2 GLOB SERPL ELPH-MCNC: 0.7 G/DL
B-GLOBULIN MFR SERPL ELPH: 10.6 %
B-GLOBULIN SERPL ELPH-MCNC: 0.7 G/DL
DEPRECATED KAPPA LC FREE/LAMBDA SER: 1.2 RATIO
GAMMA GLOB FLD ELPH-MCNC: 0.6 G/DL
GAMMA GLOB MFR SERPL ELPH: 9.2 %
IGA SER QL IEP: 67 MG/DL
IGG SER QL IEP: 443 MG/DL
IGM SER QL IEP: 68 MG/DL
INTERPRETATION SERPL IEP-IMP: NORMAL
KAPPA LC CSF-MCNC: 0.54 MG/DL
KAPPA LC SERPL-MCNC: 0.65 MG/DL
M PROTEIN SPEC IFE-MCNC: NORMAL
PROT SERPL-MCNC: 6.8 G/DL
PROT SERPL-MCNC: 6.8 G/DL

## 2025-03-31 ENCOUNTER — NON-APPOINTMENT (OUTPATIENT)
Age: 80
End: 2025-03-31

## 2025-04-05 ENCOUNTER — EMERGENCY (EMERGENCY)
Facility: HOSPITAL | Age: 80
LOS: 1 days | End: 2025-04-05
Attending: STUDENT IN AN ORGANIZED HEALTH CARE EDUCATION/TRAINING PROGRAM
Payer: MEDICARE

## 2025-04-05 VITALS
HEART RATE: 108 BPM | TEMPERATURE: 98 F | SYSTOLIC BLOOD PRESSURE: 155 MMHG | DIASTOLIC BLOOD PRESSURE: 81 MMHG | RESPIRATION RATE: 18 BRPM | OXYGEN SATURATION: 96 %

## 2025-04-05 DIAGNOSIS — Z96.649 PRESENCE OF UNSPECIFIED ARTIFICIAL HIP JOINT: Chronic | ICD-10-CM

## 2025-04-05 LAB
ALBUMIN SERPL ELPH-MCNC: 4.4 G/DL — SIGNIFICANT CHANGE UP (ref 3.3–5.2)
ALP SERPL-CCNC: 81 U/L — SIGNIFICANT CHANGE UP (ref 40–120)
ALT FLD-CCNC: 38 U/L — HIGH
ANION GAP SERPL CALC-SCNC: 13 MMOL/L — SIGNIFICANT CHANGE UP (ref 5–17)
APTT BLD: 31.1 SEC — SIGNIFICANT CHANGE UP (ref 24.5–35.6)
AST SERPL-CCNC: 59 U/L — HIGH
BASOPHILS # BLD AUTO: 0.02 K/UL — SIGNIFICANT CHANGE UP (ref 0–0.2)
BASOPHILS NFR BLD AUTO: 0.4 % — SIGNIFICANT CHANGE UP (ref 0–2)
BILIRUB SERPL-MCNC: 0.3 MG/DL — LOW (ref 0.4–2)
BLD GP AB SCN SERPL QL: SIGNIFICANT CHANGE UP
BUN SERPL-MCNC: 13.8 MG/DL — SIGNIFICANT CHANGE UP (ref 8–20)
CALCIUM SERPL-MCNC: 9.9 MG/DL — SIGNIFICANT CHANGE UP (ref 8.4–10.5)
CHLORIDE SERPL-SCNC: 103 MMOL/L — SIGNIFICANT CHANGE UP (ref 96–108)
CO2 SERPL-SCNC: 27 MMOL/L — SIGNIFICANT CHANGE UP (ref 22–29)
CREAT SERPL-MCNC: 0.3 MG/DL — LOW (ref 0.5–1.3)
EGFR: 109 ML/MIN/1.73M2 — SIGNIFICANT CHANGE UP
EGFR: 109 ML/MIN/1.73M2 — SIGNIFICANT CHANGE UP
EOSINOPHIL # BLD AUTO: 0.14 K/UL — SIGNIFICANT CHANGE UP (ref 0–0.5)
EOSINOPHIL NFR BLD AUTO: 2.8 % — SIGNIFICANT CHANGE UP (ref 0–6)
GLUCOSE SERPL-MCNC: 196 MG/DL — HIGH (ref 70–99)
HCT VFR BLD CALC: 36.9 % — SIGNIFICANT CHANGE UP (ref 34.5–45)
HGB BLD-MCNC: 11.6 G/DL — SIGNIFICANT CHANGE UP (ref 11.5–15.5)
IMM GRANULOCYTES # BLD AUTO: 0.02 K/UL — SIGNIFICANT CHANGE UP (ref 0–0.07)
IMM GRANULOCYTES NFR BLD AUTO: 0.4 % — SIGNIFICANT CHANGE UP (ref 0–0.9)
INR BLD: 1 RATIO — SIGNIFICANT CHANGE UP (ref 0.85–1.16)
LYMPHOCYTES # BLD AUTO: 0.67 K/UL — LOW (ref 1–3.3)
LYMPHOCYTES NFR BLD AUTO: 13.5 % — SIGNIFICANT CHANGE UP (ref 13–44)
MCHC RBC-ENTMCNC: 27 PG — SIGNIFICANT CHANGE UP (ref 27–34)
MCHC RBC-ENTMCNC: 31.4 G/DL — LOW (ref 32–36)
MCV RBC AUTO: 86 FL — SIGNIFICANT CHANGE UP (ref 80–100)
MONOCYTES # BLD AUTO: 0.23 K/UL — SIGNIFICANT CHANGE UP (ref 0–0.9)
MONOCYTES NFR BLD AUTO: 4.6 % — SIGNIFICANT CHANGE UP (ref 2–14)
NEUTROPHILS # BLD AUTO: 3.87 K/UL — SIGNIFICANT CHANGE UP (ref 1.8–7.4)
NEUTROPHILS NFR BLD AUTO: 78.3 % — HIGH (ref 43–77)
NRBC # BLD AUTO: 0 K/UL — SIGNIFICANT CHANGE UP (ref 0–0)
NRBC # FLD: 0 K/UL — SIGNIFICANT CHANGE UP (ref 0–0)
NRBC BLD AUTO-RTO: 0 /100 WBCS — SIGNIFICANT CHANGE UP (ref 0–0)
PLATELET # BLD AUTO: 169 K/UL — SIGNIFICANT CHANGE UP (ref 150–400)
PMV BLD: 10.5 FL — SIGNIFICANT CHANGE UP (ref 7–13)
POTASSIUM SERPL-MCNC: 3.4 MMOL/L — LOW (ref 3.5–5.3)
POTASSIUM SERPL-SCNC: 3.4 MMOL/L — LOW (ref 3.5–5.3)
PROT SERPL-MCNC: 6.7 G/DL — SIGNIFICANT CHANGE UP (ref 6.6–8.7)
PROTHROM AB SERPL-ACNC: 11.6 SEC — SIGNIFICANT CHANGE UP (ref 9.9–13.4)
RBC # BLD: 4.29 M/UL — SIGNIFICANT CHANGE UP (ref 3.8–5.2)
RBC # FLD: 14.6 % — HIGH (ref 10.3–14.5)
SODIUM SERPL-SCNC: 143 MMOL/L — SIGNIFICANT CHANGE UP (ref 135–145)
WBC # BLD: 4.95 K/UL — SIGNIFICANT CHANGE UP (ref 3.8–10.5)
WBC # FLD AUTO: 4.95 K/UL — SIGNIFICANT CHANGE UP (ref 3.8–10.5)

## 2025-04-05 PROCEDURE — 99291 CRITICAL CARE FIRST HOUR: CPT | Mod: GC

## 2025-04-05 PROCEDURE — 73560 X-RAY EXAM OF KNEE 1 OR 2: CPT | Mod: 26,LT

## 2025-04-05 PROCEDURE — 71045 X-RAY EXAM CHEST 1 VIEW: CPT | Mod: 26

## 2025-04-05 PROCEDURE — 70496 CT ANGIOGRAPHY HEAD: CPT | Mod: 26

## 2025-04-05 PROCEDURE — 70498 CT ANGIOGRAPHY NECK: CPT | Mod: 26

## 2025-04-05 PROCEDURE — 99283 EMERGENCY DEPT VISIT LOW MDM: CPT

## 2025-04-05 PROCEDURE — 70450 CT HEAD/BRAIN W/O DYE: CPT | Mod: 26,77,XU

## 2025-04-05 PROCEDURE — 73502 X-RAY EXAM HIP UNI 2-3 VIEWS: CPT | Mod: 26,LT

## 2025-04-05 PROCEDURE — 99282 EMERGENCY DEPT VISIT SF MDM: CPT | Mod: GC

## 2025-04-05 PROCEDURE — 72192 CT PELVIS W/O DYE: CPT | Mod: 26

## 2025-04-05 PROCEDURE — 72125 CT NECK SPINE W/O DYE: CPT | Mod: 26

## 2025-04-05 PROCEDURE — 70450 CT HEAD/BRAIN W/O DYE: CPT | Mod: 26,XU

## 2025-04-05 RX ORDER — ACETAMINOPHEN 500 MG/5ML
650 LIQUID (ML) ORAL EVERY 6 HOURS
Refills: 0 | Status: ACTIVE | OUTPATIENT
Start: 2025-04-05 | End: 2026-03-04

## 2025-04-05 RX ORDER — ATORVASTATIN CALCIUM 80 MG/1
10 TABLET, FILM COATED ORAL AT BEDTIME
Refills: 0 | Status: ACTIVE | OUTPATIENT
Start: 2025-04-05 | End: 2026-03-04

## 2025-04-05 RX ORDER — ESCITALOPRAM OXALATE 20 MG/1
10 TABLET ORAL DAILY
Refills: 0 | Status: ACTIVE | OUTPATIENT
Start: 2025-04-05 | End: 2026-03-04

## 2025-04-05 RX ORDER — ACETAMINOPHEN 500 MG/5ML
650 LIQUID (ML) ORAL ONCE
Refills: 0 | Status: COMPLETED | OUTPATIENT
Start: 2025-04-05 | End: 2025-04-05

## 2025-04-05 RX ORDER — METFORMIN HYDROCHLORIDE 850 MG/1
250 TABLET ORAL AT BEDTIME
Refills: 0 | Status: ACTIVE | OUTPATIENT
Start: 2025-04-05 | End: 2026-03-04

## 2025-04-05 RX ORDER — ENALAPRIL MALEATE 20 MG
10 TABLET ORAL DAILY
Refills: 0 | Status: ACTIVE | OUTPATIENT
Start: 2025-04-05 | End: 2026-03-04

## 2025-04-05 RX ADMIN — Medication 650 MILLIGRAM(S): at 20:11

## 2025-04-05 RX ADMIN — Medication 2 MILLIGRAM(S): at 09:58

## 2025-04-05 RX ADMIN — Medication 650 MILLIGRAM(S): at 06:13

## 2025-04-05 NOTE — ED ADULT TRIAGE NOTE - CHIEF COMPLAINT QUOTE
pt BIBEMS from home s/p trip and fall backwards down 2 steps, - LOC/BT use, + headstrike, + hematoma to the back of her head, hx of HTN/arthritis, pt c/o pain to her upper back/left shoulder and left hip, MD notified [phone]

## 2025-04-05 NOTE — ED ADULT TRIAGE NOTE - ISOLATION TYPE:
CERTIFICATE OF WORK      January 28, 2021      Re: Sushma Moss  [address] [place] [address] [place] [id number]        This is to certify that Sushma Moss has been under my care from 1/28/2021 and can return to regular work on 1/30/21 with no restrictions.    RESTRICTIONS: none        SIGNATURE:___________________________________________          DO [facility]  [address] [facility] [address]  Dept Phone: [phone]     None

## 2025-04-05 NOTE — CONSULT NOTE ADULT - ATTENDING COMMENTS
Above assessment noted.  The patient was seen and examined by myself with the surgical resident.  The patient is with complaints of headache and left knee and left hip pain.  The patient is without chest pain, shortness of breath, and no abdominal pain.  HEENT NC/AT PERRL EOMI no scleral icterus trachea midline, chest B/L air entry, abdomen is soft with no localizing tenderness, guarding, or rebound, left hip without tenderness to palpation, no pelvic tenderness or crepitus, extremities with no gross angulation or deformity.  Head CT scan with small traumatic SAH, repeat CTA shows interval stability.  X-ray left hip with no periprosthetic fracture, knee x-ray no fracture.  Case discussed with neurosurgery attending, the patient has been cleared from a neurosurgery standpoint for discharge.  From a trauma standpoint, recommend a CT scan of the left hip, ortho consultation, PT for ambulation.  Cleared from trauma standpoint for home if no fracture on CT and able to ambulate with PT.  If unable to ambulate or periprosthetic fracture on CT then suggest admit to medicine with ortho consult. Please recall as needed as no trauma intervention needed.

## 2025-04-05 NOTE — ED ADULT NURSE REASSESSMENT NOTE - NS ED NURSE REASSESS COMMENT FT1
Assumed care at 0715, received report from Max ALFARO, pt a&ox3, VSS, /cm in place, pending repeat CT scans, no distress noted.

## 2025-04-05 NOTE — ED PROVIDER NOTE - OBJECTIVE STATEMENT
79y female w/ pmh of HTN, HLD, traumatic SAH presenting after a fall and head strike that occurred around midnight tonight. state she was walking up stairs when she caught her foot and fell backwards. she struck the back of her head. denies LOC. denies any preceding chest pain, SOB, headache, weakness. she has a headache and upper back pain. she has been able to ambulate. denies any vision changes, speech changes, arm or leg weakness or numbness. denies blood thinners.

## 2025-04-05 NOTE — CONSULT NOTE ADULT - SUBJECTIVE AND OBJECTIVE BOX
HISTORY OF PRESENT ILLNESS:   79yF [facility]     PAST MEDICAL & SURGICAL HISTORY:  Osteoporosis  High blood pressure  Hyperlipidemia, unspecified hyperlipidemia  S/P hip replacement    FAMILY HISTORY:  No pertinent family history    SOCIAL HISTORY:  Tobacco Use: None  EtOH use: Occasional glass of wine   Substance: None    ALLERGIES:  Sulfa drugs (Unknown)    REVIEW OF SYSTEMS  Negative except as noted in HPI    HOME MEDICATIONS:  acetaminophen 325 mg oral tablet: 2 tab(s) orally every 6 hours As needed Temp greater or equal to 38C (100.4F), Mild Pain (1 - 3), Moderate Pain (4 - 6) (13 Jun 2024 16:07)  amLODIPine 5 mg oral tablet: 1 tab(s) orally once a day (10 Manish 2024 02:30)  atorvastatin 10 mg oral tablet: 1 tab(s) orally once a day (13 Jun 2024 16:07)  enalapril 10 mg oral tablet: 1 tab(s) orally once a day (10 Manish 2024 02:30)  famotidine 20 mg oral tablet: 1 tab(s) orally 2 times a day (10 Manish 2024 02:30)  Lexapro 10 mg oral tablet: 1 tab(s) orally once a day (10 Manish 2024 02:30)  metFORMIN 500 mg oral tablet: 0.5 tab(s) orally (10 Manish 2024 02:30)  pantoprazole 40 mg oral delayed release tablet: 1 tab(s) orally once a day (10 Manish 2024 02:30)    Vital Signs Last 24 Hrs  T(C): 36.6 (05 Apr 2025 01:01), Max: 36.6 (05 Apr 2025 01:01)  T(F): 97.8 (05 Apr 2025 01:01), Max: 97.8 (05 Apr 2025 01:01)  HR: 89 (05 Apr 2025 05:00) (87 - 108)  BP: 103/89 (05 Apr 2025 05:00) (96/60 - 155/81)  RR: 12 (05 Apr 2025 05:00) (12 - 18)  SpO2: 97% (05 Apr 2025 05:00) (96% - 99%)  Parameters below as of 05 Apr 2025 05:00  Patient On (Oxygen Delivery Method): room air    PHYSICAL EXAM:  GENERAL: NAD  HEAD: Atraumatic, normocephalic  NECK: Supple, nontender to palpation  MENTAL STATUS: AAOx3; awake; opens eyes spontaneously; appropriately conversant without aphasia; following simple commands  CRANIAL NERVES: PERRL. EOMI. Facial sensation intact V1-3 distribution b/l. Face symmetric w/ normal eye closure and smile, tongue midline. Hearing grossly intact. Speech clear.    MOTOR: Strength 5/5 b/l upper and lower extremities, no UE drift  SENSATION: Grossly intact to light touch all extremities  CHEST/LUNG: Non-labored breathing on room-air  SKIN: Warm, dry    LABS:                        11.6   4.95  )-----------( 169      ( 05 Apr 2025 03:09 )             36.9     04-05    143  |  103  |  13.8  ----------------------------<  196[H]  3.4[L]   |  27.0  |  0.30[L]    Ca    9.9      05 Apr 2025 03:09    TPro  6.7  /  Alb  4.4  /  TBili  0.3[L]  /  DBili  x   /  AST  59[H]  /  ALT  38[H]  /  AlkPhos  81  04-05    PT/INR - ( 05 Apr 2025 03:09 )   PT: 11.6 sec;   INR: 1.00 ratio    PTT - ( 05 Apr 2025 03:09 )  PTT:31.1 sec    Urinalysis Basic - ( 05 Apr 2025 03:09 )  Color: x / Appearance: x / SG: x / pH: x  Gluc: 196 mg/dL / Ketone: x  / Bili: x / Urobili: x   Blood: x / Protein: x / Nitrite: x   Leuk Esterase: x / RBC: x / WBC x   Sq Epi: x / Non Sq Epi: x / Bacteria: x    RADIOLOGY & ADDITIONAL STUDIES:     HISTORY OF PRESENT ILLNESS:   Patient is a 79-year-old female with PMH of HTN, HLD, b/l hip replacements and left knee replacement (2024), h/o traumatic SAH 6/2024 after a fall, now presenting after a mechanical fall and headstrike that occurred around midnight. Patient states she was walking up stairs when she caught her foot and fell backwards, hitting the back of her head. Denies any LOCC or preceding chest pain, SOB, headache, or weakness. On CT imaging she is found to have a small volume subarachnoid hemorrhage within the interhemispheric fissure and a probable small volume subarachnoid hemorrhage in the left choroidal fissure. No significant mass effect or midline shift. She complains of a moderate headache and left hip pain, denies any blurred vision or weakness. She denies any blood thinner use but admits to Ibuprofen use x2 days for knee pain. Neurosurgery consulted for traumatic SAH.    PAST MEDICAL & SURGICAL HISTORY:  Osteoporosis  High blood pressure  Hyperlipidemia, unspecified hyperlipidemia  S/P hip replacement    FAMILY HISTORY:  No pertinent family history    SOCIAL HISTORY:  Tobacco Use: None  EtOH use: Occasional glass of wine   Substance: None    ALLERGIES:  Sulfa drugs (Unknown)    REVIEW OF SYSTEMS  Negative except as noted in HPI    HOME MEDICATIONS:  acetaminophen 325 mg oral tablet: 2 tab(s) orally every 6 hours As needed Temp greater or equal to 38C (100.4F), Mild Pain (1 - 3), Moderate Pain (4 - 6) (13 Jun 2024 16:07)  amLODIPine 5 mg oral tablet: 1 tab(s) orally once a day (10 Manish 2024 02:30)  atorvastatin 10 mg oral tablet: 1 tab(s) orally once a day (13 Jun 2024 16:07)  enalapril 10 mg oral tablet: 1 tab(s) orally once a day (10 Manish 2024 02:30)  famotidine 20 mg oral tablet: 1 tab(s) orally 2 times a day (10 Manish 2024 02:30)  Lexapro 10 mg oral tablet: 1 tab(s) orally once a day (10 Manish 2024 02:30)  metFORMIN 500 mg oral tablet: 0.5 tab(s) orally (10 Manish 2024 02:30)  pantoprazole 40 mg oral delayed release tablet: 1 tab(s) orally once a day (10 Manish 2024 02:30)    Vital Signs Last 24 Hrs  T(C): 36.6 (05 Apr 2025 01:01), Max: 36.6 (05 Apr 2025 01:01)  T(F): 97.8 (05 Apr 2025 01:01), Max: 97.8 (05 Apr 2025 01:01)  HR: 89 (05 Apr 2025 05:00) (87 - 108)  BP: 103/89 (05 Apr 2025 05:00) (96/60 - 155/81)  RR: 12 (05 Apr 2025 05:00) (12 - 18)  SpO2: 97% (05 Apr 2025 05:00) (96% - 99%)  Parameters below as of 05 Apr 2025 05:00  Patient On (Oxygen Delivery Method): room air    PHYSICAL EXAM:  GENERAL: NAD  HEAD: Atraumatic, normocephalic  NECK: Supple, nontender to palpation  MENTAL STATUS: AAOx3; awake; opens eyes spontaneously; appropriately conversant without aphasia; following simple commands  CRANIAL NERVES: PERRL. EOMI. Facial sensation intact V1-3 distribution b/l. Face symmetric w/ normal eye closure and smile, tongue midline. Hearing grossly intact. Speech clear.    MOTOR: Strength 5/5 b/l upper and lower extremities, no UE drift  SENSATION: Grossly intact to light touch all extremities  CHEST/LUNG: Non-labored breathing on room-air  SKIN: Warm, dry    LABS:                        11.6   4.95  )-----------( 169      ( 05 Apr 2025 03:09 )             36.9     04-05    143  |  103  |  13.8  ----------------------------<  196[H]  3.4[L]   |  27.0  |  0.30[L]    Ca    9.9      05 Apr 2025 03:09    TPro  6.7  /  Alb  4.4  /  TBili  0.3[L]  /  DBili  x   /  AST  59[H]  /  ALT  38[H]  /  AlkPhos  81  04-05    PT/INR - ( 05 Apr 2025 03:09 )   PT: 11.6 sec;   INR: 1.00 ratio    PTT - ( 05 Apr 2025 03:09 )  PTT:31.1 sec    Urinalysis Basic - ( 05 Apr 2025 03:09 )  Color: x / Appearance: x / SG: x / pH: x  Gluc: 196 mg/dL / Ketone: x  / Bili: x / Urobili: x   Blood: x / Protein: x / Nitrite: x   Leuk Esterase: x / RBC: x / WBC x   Sq Epi: x / Non Sq Epi: x / Bacteria: x    RADIOLOGY & ADDITIONAL STUDIES:     HISTORY OF PRESENT ILLNESS:   Patient is a 79-year-old female with PMH of HTN, HLD, b/l hip replacements and left knee replacement (2024), h/o traumatic SAH 6/2024 after a fall, now presenting after a mechanical fall and headstrike that occurred around midnight. Patient states she was walking up stairs when she caught her foot and fell backwards, hitting the back of her head. Denies any LOCC or preceding chest pain, SOB, headache, or weakness. On CT imaging she is found to have a small volume subarachnoid hemorrhage within the interhemispheric fissure and a probable small volume subarachnoid hemorrhage in the left choroidal fissure. No significant mass effect or midline shift. She complains of a moderate headache and left hip pain, denies any blurred vision or weakness. She denies any blood thinner use but admits to Ibuprofen use x2 days for knee pain. Neurosurgery consulted for traumatic SAH.    PAST MEDICAL & SURGICAL HISTORY:  Osteoporosis  High blood pressure  Hyperlipidemia, unspecified hyperlipidemia  S/P hip replacement    FAMILY HISTORY:  No pertinent family history    SOCIAL HISTORY:  Tobacco Use: None  EtOH use: Occasional glass of wine   Substance: None    ALLERGIES:  Sulfa drugs (Unknown)    REVIEW OF SYSTEMS  Negative except as noted in HPI    HOME MEDICATIONS:  acetaminophen 325 mg oral tablet: 2 tab(s) orally every 6 hours As needed Temp greater or equal to 38C (100.4F), Mild Pain (1 - 3), Moderate Pain (4 - 6) (13 Jun 2024 16:07)  amLODIPine 5 mg oral tablet: 1 tab(s) orally once a day (10 Manish 2024 02:30)  atorvastatin 10 mg oral tablet: 1 tab(s) orally once a day (13 Jun 2024 16:07)  enalapril 10 mg oral tablet: 1 tab(s) orally once a day (10 Manish 2024 02:30)  famotidine 20 mg oral tablet: 1 tab(s) orally 2 times a day (10 Manish 2024 02:30)  Lexapro 10 mg oral tablet: 1 tab(s) orally once a day (10 Manish 2024 02:30)  metFORMIN 500 mg oral tablet: 0.5 tab(s) orally (10 Manish 2024 02:30)  pantoprazole 40 mg oral delayed release tablet: 1 tab(s) orally once a day (10 Manish 2024 02:30)    Vital Signs Last 24 Hrs  T(C): 36.6 (05 Apr 2025 01:01), Max: 36.6 (05 Apr 2025 01:01)  T(F): 97.8 (05 Apr 2025 01:01), Max: 97.8 (05 Apr 2025 01:01)  HR: 89 (05 Apr 2025 05:00) (87 - 108)  BP: 103/89 (05 Apr 2025 05:00) (96/60 - 155/81)  RR: 12 (05 Apr 2025 05:00) (12 - 18)  SpO2: 97% (05 Apr 2025 05:00) (96% - 99%)  Parameters below as of 05 Apr 2025 05:00  Patient On (Oxygen Delivery Method): room air    PHYSICAL EXAM:  GENERAL: NAD  HEAD: Atraumatic, normocephalic  NECK: Supple, nontender to palpation  MENTAL STATUS: AAOx3; awake; opens eyes spontaneously; appropriately conversant without aphasia; following simple commands  CRANIAL NERVES: PERRL. EOMI. Facial sensation intact V1-3 distribution b/l. Face symmetric w/ normal eye closure and smile, tongue midline. Hearing grossly intact. Speech clear.    MOTOR: Strength 5/5 b/l upper and lower extremities, no UE drift  SENSATION: Grossly intact to light touch all extremities  CHEST/LUNG: Non-labored breathing on room-air  SKIN: Warm, dry    LABS:                        11.6   4.95  )-----------( 169      ( 05 Apr 2025 03:09 )             36.9     04-05    143  |  103  |  13.8  ----------------------------<  196[H]  3.4[L]   |  27.0  |  0.30[L]    Ca    9.9      05 Apr 2025 03:09    TPro  6.7  /  Alb  4.4  /  TBili  0.3[L]  /  DBili  x   /  AST  59[H]  /  ALT  38[H]  /  AlkPhos  81  04-05    PT/INR - ( 05 Apr 2025 03:09 )   PT: 11.6 sec;   INR: 1.00 ratio    PTT - ( 05 Apr 2025 03:09 )  PTT:31.1 sec    Urinalysis Basic - ( 05 Apr 2025 03:09 )  Color: x / Appearance: x / SG: x / pH: x  Gluc: 196 mg/dL / Ketone: x  / Bili: x / Urobili: x   Blood: x / Protein: x / Nitrite: x   Leuk Esterase: x / RBC: x / WBC x   Sq Epi: x / Non Sq Epi: x / Bacteria: x    RADIOLOGY & ADDITIONAL STUDIES:    CT Head No Cont (04.05.25 @ 01:45)    IMPRESSION:    CT HEAD:  Small volume subarachnoid hemorrhage within the interhemispheric fissure   and a probable small volume subarachnoid hemorrhage in the left choroidal   fissure. No significant mass effect or midline shift.    CT CERVICAL SPINE:  No acute fracture or traumatic subluxation.    Multi-level degenerative changes.

## 2025-04-05 NOTE — CONSULT NOTE ADULT - SUBJECTIVE AND OBJECTIVE BOX
Pt Name: JENNI ALLAN    MRN: [id number]      Patient is a 79y Female presenting to the emergency department with a chief complaint of fall.  Patient states she fell while going downstairs early this am.  Patient was found to have SAH and possible nondisplaced left acetabular fx s/p left total hip arthroplasty with cage - done in [facility].  patient in bed comfortable, states mild pain to left hip and knee.        REVIEW OF SYSTEMS    General: Alert, responsive, in NAD    Skin/Breast: No rashes, no pruritis   	  Ophthalmologic: No visual changes. No redness.   	  ENMT:	No discharge. No swelling.    Respiratory and Thorax: No difficulty breathing. No cough.  	   Cardiovascular:	No chest pain. No palpitations.    Gastrointestinal:	 No abdominal pain. No diarrhea.     Genitourinary: No dysuria. No bleeding.    Musculoskeletal: SEE HPI.    Neurological: No sensory or motor changes.     Psychiatric: No anxiety or depression.    Hematology/Lymphatics: No swelling.    Endocrine: No Hx of diabetes.    ROS is otherwise negative.    PAST MEDICAL & SURGICAL HISTORY:  PAST MEDICAL & SURGICAL HISTORY:  Osteoporosis      High blood pressure      Hyperlipidemia, unspecified hyperlipidemia      S/P hip replacement          Allergies: sulfa drugs (Unknown)      Medications:     FAMILY HISTORY:  No pertinent family history    : non-contributory                          11.6   4.95  )-----------( 169      ( 05 Apr 2025 03:09 )             36.9       04-05    143  |  103  |  13.8  ----------------------------<  196[H]  3.4[L]   |  27.0  |  0.30[L]    Ca    9.9      05 Apr 2025 03:09    TPro  6.7  /  Alb  4.4  /  TBili  0.3[L]  /  DBili  x   /  AST  59[H]  /  ALT  38[H]  /  AlkPhos  81  04-05      Vital Signs Last 24 Hrs  T(C): 36.6 (05 Apr 2025 01:01), Max: 36.6 (05 Apr 2025 01:01)  T(F): 97.8 (05 Apr 2025 01:01), Max: 97.8 (05 Apr 2025 01:01)  HR: 85 (05 Apr 2025 13:04) (85 - 108)  BP: 113/74 (05 Apr 2025 13:04) (96/60 - 155/81)  BP(mean): 71 (05 Apr 2025 07:00) (71 - 71)  RR: 18 (05 Apr 2025 13:04) (12 - 18)  SpO2: 93% (05 Apr 2025 13:04) (93% - 99%)    Parameters below as of 05 Apr 2025 13:04  Patient On (Oxygen Delivery Method): room air        Daily     Daily       PHYSICAL EXAM:      Appearance: Alert, responsive, in no acute distress.    Neurological: Sensation is grossly intact to light touch. 5/5 motor function of all extremities. No focal deficits or weaknesses found.    Skin: no rash on visible skin. Skin is clean, dry and intact. No bleeding. No abrasions. No ulcerations.    Vascular: 2+ distal pulses. Cap refill < 2 sec. No signs of venous insufficiency or stasis. No extremity ulcerations. No cyanosis.    Musculoskeletal:         Left Lower Extremity: left knee no swelling, FROM without pain, mild lateral tenderness.  Left hip FROM, no tenderness, no shortening or rotation of leg    Imaging Studies:  < from: CT Pelvis Bony Only No Cont (04.05.25 @ 19:45) >  ******PRELIMINARY REPORT******      ******PRELIMINARY REPORT******         ACC: [id number] EXAM:  CT PELVIS BONY ONLY   ORDERED BY: RUBINA DUNBAR     PROCEDURE DATE:  04/05/2025    ******PRELIMINARY REPORT******      ******PRELIMINARY REPORT******           INTERPRETATION:  [facility] RADIOLOGIST PRELIMINARY REPORT    PROCEDURE INFORMATION:  Exam: CT Pelvis Without Contrast, Skeleton  Exam date and time: 4/5/2025 7:27 PM  Age: 79 years old  Clinical indication: Fall    TECHNIQUE:  Imaging protocol: Computed tomography of the pelvis without contrast. Exam  focused on the skeleton.    COMPARISON:  CT ABDOMEN AND PELVIS WITH ORAL CONTRAST WITH IV CONTRAST 7/9/2019 2:33 PM    FINDINGS:  Bones/joints: Previous bilateral hip replacements. Probable nondisplaced  fracture medial left acetabulum adjacent to the acetabular component of   the  prosthesis best seen on coronal reconstructions. No other fractures   identified.  No dislocation.  Soft tissues: Unremarkable.    IMPRESSION:  Probable nondisplaced fracture medial left acetabulum adjacent to the  acetabular component of the prosthesis best seen on coronal   reconstructions.        ******PRELIMINARY REPORT******      ******PRELIMINARY REPORT******         ELIZABETH GAO M.D.;[facility] RADIOLOGIST  This document is a PRELIMINARY interpretation and is pending final   attending approval. Apr 5 2025  9:25PM    < end of copied text >      ACC: [id number] EXAM:  XR HIP 2-3V LT   ORDERED BY: LETI JOANNA     PROCEDURE DATE:  04/05/2025          INTERPRETATION:  AP pelvis and 2 views of left hip. 2 views of left knee.    Clinical indications: Left leg pain after fall.    IMPRESSION: The patient is status post left total hip arthroplasty and   acetabular reconstruction. No periprosthetic fracture is identified. If   there is persistent concern for a periprosthetic fracture, CT scan is   recommended.    The patient is status post left total knee arthroplasty with a hinged   component. There is no periprosthetic fracture identified.    --- End of Report ---            JOANA ROCHA MD; Attending Radiologist  This document has been electronically signed. Apr 5 2025  2:40PM    < end of copied text >      A/P:  Pt is a 79y Female  found to have non displaced left acetabular fx     PLAN:   TTWB left lower extremity   pain control  no acute orthopedic intervention   patient can follow up with her primary surgeon upon discharge    CASE AND IMAGING REVIEWED WITH DR EARL WHO GAVE PLAN

## 2025-04-05 NOTE — CONSULT NOTE ADULT - SUBJECTIVE AND OBJECTIVE BOX
INCOMPLETE NOTE    Patient seen and evaluated at bedside. Just returned from CT scan. Patient complaining of L hip and L knee pain, XRs ordered. May require further imaging for joe-prosthetic fracture. Full consult note to follow.      TRAUMA SURGERY CONSULT     HPI: 79y Female presenting to ED with   Patient denies fevers/chills, denies lightheadedness/dizziness, denies SOB/chest pain, denies nausea/vomiting, denies constipation/diarrhea.  ***    ROS: 10-system review is otherwise negative except HPI above.      PAST MEDICAL & SURGICAL HISTORY:  Osteoporosis      High blood pressure      Hyperlipidemia, unspecified hyperlipidemia      S/P hip replacement        FAMILY HISTORY:  No pertinent family history      Family history not pertinent as reviewed with the patient.    SOCIAL HISTORY:  Denies any toxic habits***    ALLERGIES: NKA sulfa drugs (Unknown)      HOME MEDICATIONS: ***  Home Medications:  acetaminophen 325 mg oral tablet: 2 tab(s) orally every 6 hours As needed Temp greater or equal to 38C (100.4F), Mild Pain (1 - 3), Moderate Pain (4 - 6) (13 Jun 2024 16:07)  amLODIPine 5 mg oral tablet: 1 tab(s) orally once a day (10 Manish 2024 02:30)  atorvastatin 10 mg oral tablet: 1 tab(s) orally once a day (13 Jun 2024 16:07)  enalapril 10 mg oral tablet: 1 tab(s) orally once a day (10 Manish 2024 02:30)  famotidine 20 mg oral tablet: 1 tab(s) orally 2 times a day (10 Manish 2024 02:30)  Lexapro 10 mg oral tablet: 1 tab(s) orally once a day (10 Manish 2024 02:30)  metFORMIN 500 mg oral tablet: 0.5 tab(s) orally (10 Manish 2024 02:30)  pantoprazole 40 mg oral delayed release tablet: 1 tab(s) orally once a day (10 Manish 2024 02:30)      --------------------------------------------------------------------------------------------  VITALS:  T(C): 36.6 (04-05-25 @ 01:01), Max: 36.6 (04-05-25 @ 01:01)  HR: 98 (04-05-25 @ 07:00) (87 - 108)  BP: 110/56 (04-05-25 @ 07:00) (96/60 - 155/81)  RR: 15 (04-05-25 @ 07:00) (12 - 18)  SpO2: 96% (04-05-25 @ 07:00) (96% - 99%)      PHYSICAL EXAM:   General: NAD, lying in bed comfortably  Neuro: A+Ox3  HEENT: extraocular eye movements grossly intact, MMM  Cardio: RRR  Resp: Non labored breathing on RA  GI/Abd: Soft, NT/ND, no rebound/guarding, no masses palpated  Vascular: All 4 extremities warm and well perfused  Musculoskeletal: All 4 extremities moving spontaneously, no limitations, no spinal tenderness  --------------------------------------------------------------------------------------------    LABS                 11.6   4.95   )----------(  169       ( 05 Apr 2025 03:09 )               36.9      143    |  103    |  13.8   ----------------------------<  196        ( 05 Apr 2025 03:09 )  3.4     |  27.0   |  0.30     Ca    9.9        ( 05 Apr 2025 03:09 )    TPro  6.7    /  Alb  4.4    /  TBili  0.3    /  DBili  x      /  AST  59     /  ALT  38     /  AlkPhos  81     ( 05 Apr 2025 03:09 )    LIVER FUNCTIONS - ( 05 Apr 2025 03:09 )  Alb: 4.4 g/dL / Pro: 6.7 g/dL / ALK PHOS: 81 U/L / ALT: 38 U/L / AST: 59 U/L / GGT: x           PT/INR -  11.6 sec / 1.00 ratio   ( 05 Apr 2025 03:09 )       PTT -  31.1 sec   ( 05 Apr 2025 03:09 )  CAPILLARY BLOOD GLUCOSE        Urinalysis Basic - ( 05 Apr 2025 03:09 )    Color: x / Appearance: x / SG: x / pH: x  Gluc: 196 mg/dL / Ketone: x  / Bili: x / Urobili: x   Blood: x / Protein: x / Nitrite: x   Leuk Esterase: x / RBC: x / WBC x   Sq Epi: x / Non Sq Epi: x / Bacteria: x          --------------------------------------------------------------------------------------------  IMAGING  ***    ASSESSMENT: Patient is a 79y female with ***    PLAN:    - No indication for acute surgical intervention  - Recommend  - Dispo as per   - Care as per primary team  - Pain control  - OOB/ambulate  - Strict I/Os  - DVT ppx:    Patient and plan discussed with attending, Dr. Patience Gaxiola MD TRAUMA SURGERY CONSULT     HPI: 79y Female presenting to ED after mechanical fall down stairs. CT head demonstrating SAH/SDH, for which neurosurgery recommended stability scan and trauma consult. Patient denies lightheadedness or dizziness prior to fall. Landed on L side, has some L hip pain. Not on AC/AP. Patient denies fevers/chills, denies lightheadedness/dizziness, denies SOB/chest pain, denies nausea/vomiting, denies constipation/diarrhea.    ROS: 10-system review is otherwise negative except HPI above.      PAST MEDICAL & SURGICAL HISTORY:  Osteoporosis      High blood pressure      Hyperlipidemia, unspecified hyperlipidemia      S/P bilateral hip replacement  S/p bilateral knee replacement        FAMILY HISTORY:  No pertinent family history      Family history not pertinent as reviewed with the patient.    SOCIAL HISTORY:  Denies any toxic habits    ALLERGIES: NKA sulfa drugs (Unknown)      HOME MEDICATIONS:  Home Medications:  acetaminophen 325 mg oral tablet: 2 tab(s) orally every 6 hours As needed Temp greater or equal to 38C (100.4F), Mild Pain (1 - 3), Moderate Pain (4 - 6) (13 Jun 2024 16:07)  amLODIPine 5 mg oral tablet: 1 tab(s) orally once a day (10 Manish 2024 02:30)  atorvastatin 10 mg oral tablet: 1 tab(s) orally once a day (13 Jun 2024 16:07)  enalapril 10 mg oral tablet: 1 tab(s) orally once a day (10 Manish 2024 02:30)  famotidine 20 mg oral tablet: 1 tab(s) orally 2 times a day (10 Manish 2024 02:30)  Lexapro 10 mg oral tablet: 1 tab(s) orally once a day (10 Manish 2024 02:30)  metFORMIN 500 mg oral tablet: 0.5 tab(s) orally (10 Manish 2024 02:30)  pantoprazole 40 mg oral delayed release tablet: 1 tab(s) orally once a day (10 Manish 2024 02:30)      --------------------------------------------------------------------------------------------  VITALS:  T(C): 36.6 (04-05-25 @ 01:01), Max: 36.6 (04-05-25 @ 01:01)  HR: 98 (04-05-25 @ 07:00) (87 - 108)  BP: 110/56 (04-05-25 @ 07:00) (96/60 - 155/81)  RR: 15 (04-05-25 @ 07:00) (12 - 18)  SpO2: 96% (04-05-25 @ 07:00) (96% - 99%)      PHYSICAL EXAM:   General: NAD, lying in bed comfortably  Neuro: A+Ox3  HEENT: extraocular eye movements grossly intact, MMM  Cardio: RRR  Resp: Non labored breathing on RA  GI/Abd: Soft, NT/ND, no rebound/guarding, no masses palpated  Vascular: All 4 extremities warm and well perfused  Musculoskeletal: All 4 extremities moving spontaneously, no limitations, no gross deformities. No C/T/L spine tenderness, no stepoffs, deformities, lacerations or abrasions noted. No chest wall or sternal tenderness. Pelvis stable though L hip tender.    --------------------------------------------------------------------------------------------    LABS                 11.6   4.95   )----------(  169       ( 05 Apr 2025 03:09 )               36.9      143    |  103    |  13.8   ----------------------------<  196        ( 05 Apr 2025 03:09 )  3.4     |  27.0   |  0.30     Ca    9.9        ( 05 Apr 2025 03:09 )    TPro  6.7    /  Alb  4.4    /  TBili  0.3    /  DBili  x      /  AST  59     /  ALT  38     /  AlkPhos  81     ( 05 Apr 2025 03:09 )    LIVER FUNCTIONS - ( 05 Apr 2025 03:09 )  Alb: 4.4 g/dL / Pro: 6.7 g/dL / ALK PHOS: 81 U/L / ALT: 38 U/L / AST: 59 U/L / GGT: x           PT/INR -  11.6 sec / 1.00 ratio   ( 05 Apr 2025 03:09 )  PTT -  31.1 sec   ( 05 Apr 2025 03:09 )    --------------------------------------------------------------------------------------------  IMAGING  EXAM: CT CERVICAL SPINE ORDERED BY: LEON GIVENS  EXAM: CT BRAIN ORDERED BY: LEON GIVENS    PROCEDURE DATE: 04/05/2025    INTERPRETATION: CLINICAL INFORMATION: fall up backwards to head    COMPARISON: CT head 6/11/2024, CT cervical spine 3/24/2023    CONTRAST:  IV Contrast: NONE    TECHNIQUE:    CT BRAIN: Serial axial images were obtained from the skull base to the vertex using multi-slice helical technique. Sagittal and coronal reformats were obtained.    CT CERVICAL SPINE: Axial images were obtained of the cervical spine using multislice helical technique. Reformatted coronal and sagittal images were obtained.    FINDINGS:    CT BRAIN:    VENTRICLES AND SULCI: Age appropriate involutional changes.  INTRA-AXIAL: No mass effect or midline shift. There are periventricular and subcortical white matter hypodensities, consistent with microvascular type changes.  EXTRA-AXIAL: Small volume subarachnoid hemorrhage within the interhemispheric fissure. Probable small volume subarachnoid hemorrhage in the left choroidal fissure. Basal cisterns are normal in appearance.    VISUALIZED SINUSES: Clear.  TYMPANOMASTOID CAVITIES: Clear.  VISUALIZED ORBITS: Bilateral lens replacement.  CALVARIUM: Intact.    MISCELLANEOUS: Mild right parietal scalp soft tissue swelling.      CT CERVICAL SPINE:    VERTEBRAE: Normal in height. No acute fracture. Multilevel degenerative changes including marginal osteophytes.  ALIGNMENT: Straightening of the usual cervical lordosis, likely related to position and/or spasm.  INTERVERTEBRAL DISC SPACES: Multilevel cervical spondylosis with up to moderate spinal canal and neuroforaminal stenosis.    VISUALIZED LUNGS: Biapical pleural parenchymal scarring.    MISCELLANEOUS: Subcentimeter thyroid nodules.      IMPRESSION:    CT HEAD:  Small volume subarachnoid hemorrhage within the interhemispheric fissure and a probable small volume subarachnoid hemorrhage in the left choroidal fissure. No significant mass effect or midline shift.    CT CERVICAL SPINE:  No acute fracture or traumatic subluxation.    Multi-level degenerative changes.    Findings were discussed with Dr. LEON GIVENS [phone] 4/5/2025 2:14 AM by Dr. Aly with read back confirmation.    --- End of Report ---      ASSESSMENT: Patient is a 79y female with small SAH stable on repeat imaging. Neurosurgery requesting trauma consultation.    On physical exam, patient noted to have L hip pain.    PLAN:    - No indication for acute trauma surgical intervention  - Recommend Orthopedic evaluation for possible periprosthetic fracture  - Pain control PRN    Patient and plan discussed with attending, Dr. Aleah Gaxioal MD

## 2025-04-05 NOTE — ED ADULT NURSE NOTE - CHIEF COMPLAINT QUOTE
Yes
pt BIBEMS from home s/p trip and fall backwards down 2 steps, - LOC/BT use, + headstrike, + hematoma to the back of her head, hx of HTN/arthritis, pt c/o pain to her upper back/left shoulder and left hip, MD notified 0100

## 2025-04-05 NOTE — ED PROVIDER NOTE - NSFOLLOWUPINSTRUCTIONS_ED_ALL_ED_FT
Subarachnoid Hemorrhage    Subarachnoid hemorrhage is bleeding in the area between the brain and the membrane that covers the brain (subarachnoid space). This bleeding increases the pressure on the brain and decreases blood flow to certain areas of the brain. Subarachnoid hemorrhage is a medical emergency. If this condition is not treated, it may cause permanent brain damage, stroke, or even death.    What are the causes?     This condition may be caused by:    A weakened blood vessel in the brain that bursts (ruptured brain aneurysm).   A head injury (trauma).   Bleeding from blood vessels that have developed abnormally (arteriovenous malformation).  A bleeding disorder.  Use of blood thinners (anticoagulants).  Use of certain drugs, such as cocaine.    In some cases, the cause is not known.    What increases the risk?  You are more likely to develop this condition if:    You smoke.  You have high blood pressure (hypertension).  You drink too much alcohol.  You have a family history of brain aneurysm.  You are older than age 50.  You are female, especially if you have gone through menopause.  You have a certain genetic syndrome that results in kidney disease (autosomal dominant polycystic kidney disease) or connective tissue disease.  You use cocaine.    What are the signs or symptoms?  Symptoms of this condition include:    A sudden, severe headache. The headache is often described as the worst headache ever experienced.  Nausea or vomiting, especially when combined with other symptoms such as a headache.  Changes in your mental status such as:    Loss of consciousness.  Sudden confusion.  Drowsiness.  Stiff neck.  Changes in your vision such as:    Sensitivity to light.  Sudden trouble seeing out of one or both eyes.  Sudden weakness or numbness of the face, arm, or leg, especially on one side of the body.  Sudden trouble walking or difficulty moving an arm or leg.  Trouble speaking (expressive aphasia) or understanding speech (receptive aphasia).  Difficulty swallowing.  Dizziness and loss of balance or coordination.    How is this diagnosed?  This condition is diagnosed based on a physical exam and your symptoms. You may have tests, such as:    CT scan.  MRI.  Cerebral angiogram. A procedure to examine the blood vessels in the brain and neck. For this test, a dye is injected into your bloodstream before X-rays are taken. The dye makes the blood vessels easier to see.  Lumbar puncture. A procedure to remove and examine a small amount of the fluid that surrounds the brain and spinal cord.  Blood tests.  Transcranial Doppler. A procedure that uses ultrasound to monitor blood flow in the brain.    How is this treated?  This condition often requires immediate treatment in the hospital to lower the risk of brain damage. Treatment depends on the cause, severity, and location of the bleeding and any damage that it has caused. Treatment goals are to stop bleeding, repair the cause of bleeding, relieve symptoms, and prevent problems. Treatment may include:    Medicines that:    Reverse the effects of blood thinners, if you were taking blood thinners before the subarachnoid hemorrhage.  Lower the blood pressure (antihypertensives).  Relieve pain (analgesics).  Relieve nausea or vomiting.  Prevent seizures.  Surgery to stop bleeding, repair the cause of the bleeding, or remove blood that has collected. This may involve:    A procedure that is done from inside the blood vessel, in which the aneurysm is filled with small, platinum coils (endovascular coiling).  Opening the skull (craniotomy) to reach the aneurysm and put a clip at the base of the aneurysm (surgical clipping).  Surgery to relieve pressure on the brain by placing a tube (external ventricular drain, EVD) in the brain to drain blood.  Physical, occupational, or speech-language therapy to improve any mental (cognitive) and day-to-day functions that are affected by your condition.    Other treatment depends on the cause and severity of symptoms, and how long the symptoms have lasted. Actions may be taken to prevent short-term and long-term problems, including lung infection (pneumonia) and blood clots in your legs.    Follow these instructions at home:      Medicines    Take over-the-counter and prescription medicines only as told by your health care provider.  Ask your health care provider if the medicine prescribed to you requires you to avoid driving or using machinery.  Do not take any medicines that contain aspirin or NSAIDs, such as ibuprofen, unless your health care provider approves.        Lifestyle    Rest and limit activities as directed. Rest helps the brain to heal. Make sure you:    Get plenty of sleep.  Avoid activities that cause physical or mental stress.  Do not use any products that contain nicotine or tobacco, such as cigarettes, e-cigarettes, or chewing tobacco. If you need help quitting, ask your health care provider.        General instructions    Do physical, occupational, and speech-language therapy as recommended.  Follow instructions from your health care provider about whether eating and drinking are safe for you. You may need tests to make sure that you can swallow safely (swallow studies).  Check and write down your blood pressure as told by your health care provider.  Do not drive until your health care provider says that it is safe to drive.  Keep all follow-up visits as told by your health care provider. This is important.    Where to find more information  American Stroke Association: www.stroke.org    Contact a health care provider if:  You have a stiff neck.  You have a cough.  You have a fever.    Get help right away if:  You have any symptoms of a stroke. "BE FAST" is an easy way to remember the main warning signs of a stroke:    B - Balance. Signs are dizziness, sudden trouble walking, or loss of balance.  E - Eyes. Signs are trouble seeing or a sudden change in vision.  F - Face. Signs are sudden weakness or numbness of the face, or the face or eyelid drooping on one side.  A - Arms. Signs are weakness or numbness in an arm. This happens suddenly and usually on one side of the body.  S - Speech. Signs are sudden trouble speaking, slurred speech, or trouble understanding what people say.  T - Time. Time to call emergency services. Write down what time symptoms started.  You have other signs of a stroke, such as:    A sudden, severe headache with no known cause.  Nausea or vomiting.  Seizure.    These symptoms may represent a serious problem that is an emergency. Do not wait to see if the symptoms will go away. Get medical help right away. Call your local emergency services (911 in the [place] Do not drive yourself to the hospital.    Summary  Subarachnoid hemorrhage is bleeding in the area between the brain and the membrane that covers the brain (subarachnoid space).  Subarachnoid hemorrhage is a medical emergency. Treatment may include procedures to stop bleeding or reduce pressure in the skull, and medicines to prevent complications.  Follow instructions from your health care provider about diet restrictions, activity restrictions, and medicines.    ADDITIONAL NOTES AND INSTRUCTIONS    Please follow up with your Primary MD in 24-48 hr.  Seek immediate medical care for any new/worsening signs or symptoms.

## 2025-04-05 NOTE — ED ADULT NURSE NOTE - NSFALLHARMRISKINTERV_ED_ALL_ED
Assistance OOB with selected safe patient handling equipment if applicable/Communicate risk of Fall with Harm to all staff, patient, and family/Provide visual cue: red socks, yellow wristband, yellow gown, etc/Reinforce activity limits and safety measures with patient and family/Bed in lowest position, wheels locked, appropriate side rails in place/Call bell, personal items and telephone in reach/Instruct patient to call for assistance before getting out of bed/chair/stretcher/Non-slip footwear applied when patient is off stretcher/[place] to call system/Physically safe environment - no spills, clutter or unnecessary equipment/Purposeful Proactive Rounding/Room/bathroom lighting operational, light cord in reach

## 2025-04-05 NOTE — ED PROVIDER NOTE - CARE PLAN
1 See Care Plan under Instructions Principal Discharge DX:	Traumatic subarachnoid hemorrhage without loss of consciousness   Principal Discharge DX:	Traumatic subarachnoid hemorrhage without loss of consciousness  Secondary Diagnosis:	Acetabular fracture

## 2025-04-05 NOTE — ED PROVIDER NOTE - CLINICAL SUMMARY MEDICAL DECISION MAKING FREE TEXT BOX
H&P as stated. patient neuro intact on arrival, vitals stable. priority CT obtained showing SAH. trauma and neurosurgery consulted. disposition pending repeat 6 hour CT head and trauma evaluation.

## 2025-04-05 NOTE — ED PROVIDER NOTE - PROGRESS NOTE DETAILS
Micky: Spoke to ortho, not a necessary consult. Patient is bearing weight, ambulating without difficulty in ED. XR negative.

## 2025-04-05 NOTE — CONSULT NOTE ADULT - ASSESSMENT
79F with PMH of HTN, HLD, b/l hip replacements and left knee replacement (2024), h/o traumatic SAH 6/2024 after a fall, now presenting after a mechanical fall and +HS, no LOC found to have a SAH of the interhemispheric fissure and a probable small volume SAH in the left choroidal fissure. No AC/AP use. Patient complaining of headache and left hip/knee pain.    Plan:  - Recommend Q2h neuro checks   - Repeat CTH due 7:30am  - Obtain CTA Head/Neck with repeat CTH to r/o vascular abnormality  - Obtain STAT CTH if any change in neurologic exam  - Recommend trauma consult  - Pain control PRN, avoid oversedation  - Normotensive SBP goal   - Further plan pending discussion with attending  - No acute neurosurgical interventions recommended at this time  - Hold all AC/AP for now, SCDs for VTE ppx  - Will discuss with Dr. Granado

## 2025-04-05 NOTE — ED PROVIDER NOTE - ATTENDING CONTRIBUTION TO CARE
79y female w/ pmh of HTN, HLD, traumatic SAH presenting after a fall and head strike that occurred around midnight tonight. state she was walking up stairs when she caught her foot and fell backwards. she struck the back of her head. denies LOC. denies any preceding chest pain, SOB, headache, weakness. she has a headache and upper back pain. she has been able to ambulate. denies any vision changes, speech changes, arm or leg weakness or numbness. denies blood thinners.    CONSTITUTIONAL: In no apparent distress.  HEENMT: Airway patent, TM normal bilaterally, normal appearing mouth, nose, throat, neck supple with full range of motion, no cervical adenopathy.  EYES: Pupils equal, round and reactive to light, Extra-ocular movement intact, eyes are clear b/l  CARDIAC: Regular rate and rhythm, Heart sounds S1 S2 present  RESPIRATORY: No respiratory distress. No stridor, Lungs sounds clear with good aeration bilaterally.   GASTROINTESTINAL: Abdomen soft, non-tender and non-distended, no rebound, no guarding and no masses.   MUSCULOSKELETAL: Mild left hip pain with normal ROM. Spine appears normal, movement of extremities grossly intact.  NEUROLOGICAL: Alert and interactive, Cranial nerves intact no focal deficits, tone is normal, moving all extremities well, normal gait.  SKIN: No cyanosis, no pallor, no jaundice, no rash      IScooter, personally saw the patient with the resident, and completed the key components of the history and physical exam. I then discussed the management plan with the resident.    Due to the a high probability of clinically significant, life threatening deterioration, the patient required high level of preparedness to intervene emergently. I personally spent critical care time directly and personally managing the patient. This included (but not limited too reviewing  vitals, managing orders, and determining and adjusting plan depending on response to interventions.

## 2025-04-06 VITALS
TEMPERATURE: 98 F | SYSTOLIC BLOOD PRESSURE: 116 MMHG | OXYGEN SATURATION: 94 % | RESPIRATION RATE: 18 BRPM | DIASTOLIC BLOOD PRESSURE: 62 MMHG | HEART RATE: 92 BPM

## 2025-04-06 PROCEDURE — 85025 COMPLETE CBC W/AUTO DIFF WBC: CPT

## 2025-04-06 PROCEDURE — 80053 COMPREHEN METABOLIC PANEL: CPT

## 2025-04-06 PROCEDURE — 70450 CT HEAD/BRAIN W/O DYE: CPT | Mod: MC

## 2025-04-06 PROCEDURE — 86900 BLOOD TYPING SEROLOGIC ABO: CPT

## 2025-04-06 PROCEDURE — 72125 CT NECK SPINE W/O DYE: CPT | Mod: MC

## 2025-04-06 PROCEDURE — 36415 COLL VENOUS BLD VENIPUNCTURE: CPT

## 2025-04-06 PROCEDURE — 99291 CRITICAL CARE FIRST HOUR: CPT | Mod: 25

## 2025-04-06 PROCEDURE — 86901 BLOOD TYPING SEROLOGIC RH(D): CPT

## 2025-04-06 PROCEDURE — 72192 CT PELVIS W/O DYE: CPT | Mod: MC

## 2025-04-06 PROCEDURE — 85610 PROTHROMBIN TIME: CPT

## 2025-04-06 PROCEDURE — 86850 RBC ANTIBODY SCREEN: CPT

## 2025-04-06 PROCEDURE — 71045 X-RAY EXAM CHEST 1 VIEW: CPT

## 2025-04-06 PROCEDURE — 73502 X-RAY EXAM HIP UNI 2-3 VIEWS: CPT

## 2025-04-06 PROCEDURE — G0378: CPT

## 2025-04-06 PROCEDURE — 96374 THER/PROPH/DIAG INJ IV PUSH: CPT | Mod: XU

## 2025-04-06 PROCEDURE — 99235 HOSP IP/OBS SAME DATE MOD 70: CPT | Mod: FS

## 2025-04-06 PROCEDURE — 85730 THROMBOPLASTIN TIME PARTIAL: CPT

## 2025-04-06 PROCEDURE — 73560 X-RAY EXAM OF KNEE 1 OR 2: CPT

## 2025-04-06 PROCEDURE — 70496 CT ANGIOGRAPHY HEAD: CPT | Mod: MC

## 2025-04-06 PROCEDURE — 70498 CT ANGIOGRAPHY NECK: CPT | Mod: MC

## 2025-04-06 PROCEDURE — 82962 GLUCOSE BLOOD TEST: CPT

## 2025-04-06 RX ORDER — OXYCODONE HYDROCHLORIDE 30 MG/1
5 TABLET ORAL ONCE
Refills: 0 | Status: DISCONTINUED | OUTPATIENT
Start: 2025-04-06 | End: 2025-04-06

## 2025-04-06 RX ADMIN — Medication 10 MILLIGRAM(S): at 06:55

## 2025-04-06 RX ADMIN — OXYCODONE HYDROCHLORIDE 5 MILLIGRAM(S): 30 TABLET ORAL at 10:30

## 2025-04-06 RX ADMIN — Medication 20 MILLIGRAM(S): at 06:55

## 2025-04-06 RX ADMIN — OXYCODONE HYDROCHLORIDE 5 MILLIGRAM(S): 30 TABLET ORAL at 00:16

## 2025-04-06 RX ADMIN — ESCITALOPRAM OXALATE 10 MILLIGRAM(S): 20 TABLET ORAL at 11:56

## 2025-04-06 RX ADMIN — OXYCODONE HYDROCHLORIDE 5 MILLIGRAM(S): 30 TABLET ORAL at 09:25

## 2025-04-06 RX ADMIN — Medication 650 MILLIGRAM(S): at 11:56

## 2025-04-06 NOTE — CHART NOTE - NSCHARTNOTEFT_GEN_A_CORE
CM spoke to pt about DCP. Pt lives alone in downstairs part of house, son lives in an apartment upstairs but is working a lot. [facility], consignment form signed and uploaded into [facility] referral made. Pt is in agreement with referral to [facility] pt had in the past. Referral made waiting for acceptance. CM spoke to pt about DCP. Pt lives alone in downstairs part of house, son lives in an apartment upstairs but is working a lot. R/W given from [facility] closet, consignment form signed and uploaded into [facility] referral made. Pt is in agreement with referral to [facility] pt had in the past. Referral made waiting for acceptance.    [facility] accepted pt, SOC 4/7

## 2025-04-06 NOTE — ED CDU PROVIDER DISPOSITION NOTE - PATIENT PORTAL LINK FT
You can access the FollowMyHealth Patient Portal offered by [facility] by registering at the following website: [email or web address]. By joining [facility]’s FollowMyHealth portal, you will also be able to view your health information using other applications (apps) compatible with our system.

## 2025-04-06 NOTE — PHYSICAL THERAPY INITIAL EVALUATION ADULT - ADDITIONAL COMMENTS
ICD generator change , possible A lead revision
ICD generator change , possible A lead revision
Pt lives with son in a 2 story house with 2 steps to enter and 4 steps to enter. Independent with all PTA, without devices.

## 2025-04-06 NOTE — ED CDU PROVIDER INITIAL DAY NOTE - ATTENDING APP SHARED VISIT CONTRIBUTION OF CARE
I, Jahaira Vaca, participated in the care of this patient with the PA. I discussed the history and physical exam findings as well as lab results and plan of care with the PA. I agree with PA's history, physical and assessment.     80 y/o F with PMH HTN, HLD< traumatic SAH presents after fall with head strike, NSx saw, cleared, now with left hip pain, CT shows non-dislpaced acetabulum fx, WBAT, ortho requesting PT for likely JANIS.    I agree with exam as documented.

## 2025-04-06 NOTE — PROVIDER CONTACT NOTE (OTHER) - ASSESSMENT
Pt with c/o headache, before, during ad after Rx. Pt is functionally independent and not in need of PT.  Will no longer continue to follow. Pt left supine in bed in no apparent distress and call bell within reach, RN made aware.

## 2025-04-06 NOTE — ED CDU PROVIDER INITIAL DAY NOTE - PROGRESS NOTE DETAILS
79-year-old female, ED record reviewed placed in observation for possible MRI.  Patient has been having frequent falls initially had small-volume subarachnoid hemorrhage in the interhemispheric fissure and probable small volume subarachnoid hemorrhage in the left choroidal fissure with no mass effect.  Repeat head CT was unchanged.  CT bony pelvis showed is probable nondisplaced fracture of the medial left acetabulum.  CT cervical spine no acute fracture,  patient seen by neurosurgery, no acute neurosurgical intervention, seen by trauma recommending orthopedic eval no acute trauma intervention.  Seen by Ortho.  Patient was also seen by PT recommending home with home PT and rolling walker.  Pending case management assistance for safe discharge.  Patient explained this on bedside rounds this morning and agreeable to plan.  Patient states she has headache still which is not worse than previous and improved with medication.  No focal motor or sensory deficits on neuroexam. 79-year-old female, ED record reviewed,  small-volume subarachnoid hemorrhage in the interhemispheric fissure and probable small volume subarachnoid hemorrhage in the left choroidal fissure with no mass effect.  Repeat head CT was unchanged.  CT bony pelvis showed is probable nondisplaced fracture of the medial left acetabulum.  CT cervical spine no acute fracture,  patient seen by neurosurgery, no acute neurosurgical intervention, seen by trauma recommending orthopedic eval no acute trauma intervention.  Seen by Ortho.  Patient was also seen by PT recommending home with home PT and rolling walker.  Pending case management assistance for safe discharge.  Patient explained this on bedside rounds this morning and agreeable to plan.  Patient states she has headache still which is not worse than previous and improved with medication.  No focal motor or sensory deficits on neuroexam.

## 2025-04-06 NOTE — ED CDU PROVIDER DISPOSITION NOTE - CLINICAL COURSE
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 was found to have small volume subarachnoid hemorrhage of which was stable on repeat imaging  as well as left acetabular fracture, was evaluated by orthopedics, trauma service as well as neurosx team and cleared by all three. held in observation for PT assessment which was completed this am with recs for RW as well as home PT. plan for discharge with outpt fu with all three services provided with referrals in discharge paperwork. return precautions advised

## 2025-04-06 NOTE — ED CDU PROVIDER INITIAL DAY NOTE - PHYSICAL EXAMINATION
Gen: No acute distress, non toxic  HEENT: Mucous membranes moist, pink conjunctivae, EOMI. PERRL. Airway patent.   CV: RRR, nl s1/s2.  Resp: CTAB, normal rate and effort. No wheezes, rhonchi, or crackles.   GI: Abdomen soft, NT, ND. No rebound, no guarding  Neuro: A&O x4, MAEx4. 5/5 str ext x 4. Sensation intact, symmetric throughout. No FND's.   MSK: pain to left hip with passive ROM. No midline spinal ttp. No visualized or palpable deformities.  Skin: No rashes, skin intact and well perfused. Cap refill <2sec  Vascular:  Dorsalis pedal pulses 2+ b/l

## 2025-04-06 NOTE — ED CDU PROVIDER DISPOSITION NOTE - ATTENDING CONTRIBUTION TO CARE
79-year-old female, ED record reviewed,  small-volume subarachnoid hemorrhage in the interhemispheric fissure and probable small volume subarachnoid hemorrhage in the left choroidal fissure with no mass effect.  Repeat head CT was unchanged.  CT bony pelvis showed is probable nondisplaced fracture of the medial left acetabulum.  CT cervical spine no acute fracture,  patient seen by neurosurgery, no acute neurosurgical intervention, seen by trauma recommending orthopedic eval no acute trauma intervention.  Seen by Ortho.  Patient was also seen by PT recommending home with home PT and rolling walker.  Pending case management assistance for safe discharge.  Patient explained this on bedside rounds this morning and agreeable to plan.  Patient states she has headache still which is not worse than previous and improved with medication.  No focal motor or sensory deficits on neuroexam. cleared by consulting services.Plan for home PT and rolling walker

## 2025-04-06 NOTE — ED ADULT NURSE REASSESSMENT NOTE - NS ED NURSE REASSESS COMMENT FT1
Assumed care of pt from night shift RN. Pt AOx4, awaiting PT evaluation. C/o hip and head pain, 6/10, denies any other sx. Pt resting comfortably in bed, NAD noted at this time. Bed locked and in lowest position.

## 2025-04-06 NOTE — ED CDU PROVIDER DISPOSITION NOTE - PROVIDER TOKENS
PROVIDER:[TOKEN:[[id number]],FOLLOWUP:[Urgent]],PROVIDER:[TOKEN:[[id number]],FOLLOWUP:[Urgent]],PROVIDER:[TOKEN:[[id number]],FOLLOWUP:[Urgent]]

## 2025-04-06 NOTE — ED CDU PROVIDER INITIAL DAY NOTE - CLINICAL SUMMARY MEDICAL DECISION MAKING FREE TEXT BOX
79y female w/ pmh of HTN, HLD, traumatic SAH presenting after a fall and head strike that occurred around midnight tonight. state she was walking up stairs when she caught her foot and fell backwards. she struck the back of her head. denies LOC. denies any preceding chest pain, SOB, headache, weakness. NSx consulted, subsequent CT head show stable small SAH.   Pt with left hip pain, +non displaced acetabulum fx , ortho signed off PT, TTWBAT  Pending PT

## 2025-04-06 NOTE — ED ADULT NURSE REASSESSMENT NOTE - NS ED NURSE REASSESS COMMENT FT1
Pt AOx4, states pain improved w/tx, but pain has returned to 6/10. Pt resting comfortably in bed, NAD noted, resp even and unlabored. Bed locked and in lowest position.

## 2025-04-06 NOTE — ED CDU PROVIDER DISPOSITION NOTE - CARE PROVIDER_API CALL
Dustin Granado  Neurosurgery  [address] [place] [id number]  Phone: [phone]  Fax: [phone]  Follow Up Time: Urgent    Chandu Easton  Surgery of the Hand  [address] [facility], [address]  [place] [address]  Phone: [phone]  Fax: [phone]  Follow Up Time: Urgent    Surendra Bullard  Surgery  [address] [facility], [address]  [place] [address]  Phone: [phone]  Fax: [phone]  Follow Up Time: Urgent

## 2025-04-06 NOTE — ED CDU PROVIDER INITIAL DAY NOTE - OBJECTIVE STATEMENT
79y female w/ pmh of HTN, HLD, traumatic SAH presenting after a fall and head strike that occurred around midnight tonight. state she was walking up stairs when she caught her foot and fell backwards. she struck the back of her head. denies LOC. denies any preceding chest pain, SOB, headache, weakness. NSx consulted, subsequent CT head show stable small SAH.   Pt with left hip pain, +non displaced acetabulum fx , ortho signed off PT, TTWBAT

## 2025-04-06 NOTE — ED CDU PROVIDER DISPOSITION NOTE - NSFOLLOWUPINSTRUCTIONS_ED_ALL_ED_FT
please follow with   - Primary medical clinician ASAP   - ORTHOPEDICS within the next 2 weeks  - NEUROSURGICAL team   - TRAUMA service    please resume daily medications   tylenol for pain relief of headache   new or worsening medical condition seek IMMEDIATE re-evaluation     Fracture    A fracture is a break in one of your bones. This can occur from a variety of injuries, especially traumatic ones. Symptoms include pain, bruising, or swelling. Do not use the injured limb. If a fracture is in one of the bones below your waist, do not put weight on that limb unless instructed to do so by your healthcare provider. Crutches or a cane may have been provided. A splint or cast may have been applied by your health care provider. Make sure to keep it dry and follow up with an orthopedist as instructed.    SEEK IMMEDIATE MEDICAL CARE IF YOU HAVE ANY OF THE FOLLOWING SYMPTOMS: numbness, tingling, increasing pain, or weakness in any part of the injured limb.    Headache    A headache is pain or discomfort felt around the head or neck area. The specific cause of a headache may not be found as there are many types including tension headaches, migraine headaches, and cluster headaches. Watch your condition for any changes. Things you can do to manage your pain include taking over the counter and prescription medications as instructed by your health care provider, lying down in a dark quiet room, limiting stress, getting regular sleep, and refraining from alcohol and tobacco products.    SEEK IMMEDIATE MEDICAL CARE IF YOU HAVE ANY OF THE FOLLOWING SYMPTOMS: fever, vomiting, stiff neck, loss of vision, problems with speech, muscle weakness, loss of balance, trouble walking, passing out, or confusion. please follow with   - Primary medical clinician ASAP   - ORTHOPEDICS within the next 2 weeks  - NEUROSURGICAL team   - TRAUMA service    please resume daily medications   tylenol for pain relief of headache   new or worsening medical condition seek IMMEDIATE re-evaluation     Fracture    A fracture is a break in one of your bones. This can occur from a variety of injuries, especially traumatic ones. Symptoms include pain, bruising, or swelling. Do not use the injured limb. If a fracture is in one of the bones below your waist, do not put weight on that limb unless instructed to do so by your healthcare provider. Crutches or a cane may have been provided. A splint or cast may have been applied by your health care provider. Make sure to keep it dry and follow up with an orthopedist as instructed.    SEEK IMMEDIATE MEDICAL CARE IF YOU HAVE ANY OF THE FOLLOWING SYMPTOMS: numbness, tingling, increasing pain, or weakness in any part of the injured limb.    Headache    A headache is pain or discomfort felt around the head or neck area. The specific cause of a headache may not be found as there are many types including tension headaches, migraine headaches, and cluster headaches. Watch your condition for any changes. Things you can do to manage your pain include taking over the counter and prescription medications as instructed by your health care provider, lying down in a dark quiet room, limiting stress, getting regular sleep, and refraining from alcohol and tobacco products.    SEEK IMMEDIATE MEDICAL CARE IF YOU HAVE ANY OF THE FOLLOWING SYMPTOMS: fever, vomiting, stiff neck, loss of vision, problems with speech, muscle weakness, loss of balance, trouble walking, passing out, or confusion.      HOME with HOME CARE and patient requires a rolling walker due to acetabular fracture and subarachnoid hemorrhage to help complete thir MRADLS

## 2025-04-07 ENCOUNTER — APPOINTMENT (OUTPATIENT)
Dept: RHEUMATOLOGY | Facility: CLINIC | Age: 80
End: 2025-04-07

## 2025-04-09 ENCOUNTER — NON-APPOINTMENT (OUTPATIENT)
Age: 80
End: 2025-04-09

## 2025-04-17 ENCOUNTER — APPOINTMENT (OUTPATIENT)
Dept: NEUROSURGERY | Facility: CLINIC | Age: 80
End: 2025-04-17
Payer: MEDICARE

## 2025-04-17 VITALS
BODY MASS INDEX: 17.11 KG/M2 | DIASTOLIC BLOOD PRESSURE: 80 MMHG | WEIGHT: 93 LBS | OXYGEN SATURATION: 96 % | TEMPERATURE: 97.9 F | SYSTOLIC BLOOD PRESSURE: 122 MMHG | HEIGHT: 62 IN | HEART RATE: 94 BPM

## 2025-04-17 DIAGNOSIS — I61.9 NONTRAUMATIC INTRACEREBRAL HEMORRHAGE, UNSPECIFIED: ICD-10-CM

## 2025-04-17 PROCEDURE — 99212 OFFICE O/P EST SF 10 MIN: CPT

## 2025-04-21 ENCOUNTER — TRANSCRIPTION ENCOUNTER (OUTPATIENT)
Age: 80
End: 2025-04-21

## 2025-04-22 ENCOUNTER — NON-APPOINTMENT (OUTPATIENT)
Age: 80
End: 2025-04-22

## 2025-04-24 ENCOUNTER — NON-APPOINTMENT (OUTPATIENT)
Age: 80
End: 2025-04-24

## 2025-05-07 ENCOUNTER — APPOINTMENT (OUTPATIENT)
Dept: RHEUMATOLOGY | Facility: CLINIC | Age: 80
End: 2025-05-07

## 2025-05-08 ENCOUNTER — APPOINTMENT (OUTPATIENT)
Dept: RHEUMATOLOGY | Facility: CLINIC | Age: 80
End: 2025-05-08
Payer: MEDICARE

## 2025-05-08 VITALS
TEMPERATURE: 97.8 F | SYSTOLIC BLOOD PRESSURE: 118 MMHG | OXYGEN SATURATION: 95 % | DIASTOLIC BLOOD PRESSURE: 68 MMHG | RESPIRATION RATE: 18 BRPM | HEART RATE: 101 BPM

## 2025-05-08 PROCEDURE — 96372 THER/PROPH/DIAG INJ SC/IM: CPT

## 2025-05-21 ENCOUNTER — APPOINTMENT (OUTPATIENT)
Dept: NEUROSURGERY | Facility: CLINIC | Age: 80
End: 2025-05-21

## 2025-06-23 ENCOUNTER — RX RENEWAL (OUTPATIENT)
Age: 80
End: 2025-06-23

## 2025-06-25 ENCOUNTER — APPOINTMENT (OUTPATIENT)
Dept: GASTROENTEROLOGY | Facility: CLINIC | Age: 80
End: 2025-06-25
Payer: MEDICARE

## 2025-06-25 VITALS
RESPIRATION RATE: 14 BRPM | BODY MASS INDEX: 16.93 KG/M2 | OXYGEN SATURATION: 97 % | HEART RATE: 107 BPM | WEIGHT: 92 LBS | DIASTOLIC BLOOD PRESSURE: 60 MMHG | SYSTOLIC BLOOD PRESSURE: 118 MMHG | HEIGHT: 62 IN

## 2025-06-25 PROBLEM — Z80.0 FAMILY HISTORY OF MALIGNANT NEOPLASM OF COLON: Status: RESOLVED | Noted: 2018-11-16 | Resolved: 2025-06-25

## 2025-06-25 PROBLEM — I45.10 RBBB: Status: RESOLVED | Noted: 2025-06-25 | Resolved: 2025-06-25

## 2025-06-25 PROCEDURE — 99214 OFFICE O/P EST MOD 30 MIN: CPT

## 2025-06-25 PROCEDURE — G2211 COMPLEX E/M VISIT ADD ON: CPT

## 2025-06-25 RX ORDER — ALPRAZOLAM 2 MG/1
2 TABLET ORAL
Refills: 0 | Status: ACTIVE | COMMUNITY

## 2025-06-25 RX ORDER — ATORVASTATIN CALCIUM 20 MG/1
20 TABLET, FILM COATED ORAL
Refills: 0 | Status: ACTIVE | COMMUNITY

## 2025-07-21 NOTE — OCCUPATIONAL THERAPY INITIAL EVALUATION ADULT - MD ORDER
Recent Visits  Date Type Provider Dept   04/23/25 Office Visit Cb Quinones, APRN - CNP [facility]   Showing recent visits within past 540 days with a meds authorizing provider and meeting all other requirements  Future Appointments  No visits were found meeting these conditions.  Showing future appointments within next 150 days with a meds authorizing provider and meeting all other requirements      OT evaluate and treat

## 2025-07-31 ENCOUNTER — APPOINTMENT (OUTPATIENT)
Dept: RHEUMATOLOGY | Facility: CLINIC | Age: 80
End: 2025-07-31

## 2025-08-05 ENCOUNTER — APPOINTMENT (OUTPATIENT)
Dept: PULMONOLOGY | Facility: CLINIC | Age: 80
End: 2025-08-05
Payer: MEDICARE

## 2025-08-05 VITALS
SYSTOLIC BLOOD PRESSURE: 118 MMHG | HEIGHT: 61 IN | DIASTOLIC BLOOD PRESSURE: 74 MMHG | RESPIRATION RATE: 16 BRPM | WEIGHT: 89 LBS | HEART RATE: 95 BPM | OXYGEN SATURATION: 96 % | BODY MASS INDEX: 16.8 KG/M2

## 2025-08-05 DIAGNOSIS — G47.33 OBSTRUCTIVE SLEEP APNEA (ADULT) (PEDIATRIC): ICD-10-CM

## 2025-08-05 DIAGNOSIS — Z87.891 PERSONAL HISTORY OF NICOTINE DEPENDENCE: ICD-10-CM

## 2025-08-05 DIAGNOSIS — Z86.16 PERSONAL HISTORY OF COVID-19: ICD-10-CM

## 2025-08-05 DIAGNOSIS — Z63.4 DISAPPEARANCE AND DEATH OF FAMILY MEMBER: ICD-10-CM

## 2025-08-05 DIAGNOSIS — K21.9 GASTRO-ESOPHAGEAL REFLUX DISEASE W/OUT ESOPHAGITIS: ICD-10-CM

## 2025-08-05 PROCEDURE — G2211 COMPLEX E/M VISIT ADD ON: CPT

## 2025-08-05 PROCEDURE — 99204 OFFICE O/P NEW MOD 45 MIN: CPT

## 2025-08-05 RX ORDER — VITAMIN E (DL,TOCOPHERYL ACET) 180 MG
CAPSULE ORAL
Refills: 0 | Status: ACTIVE | COMMUNITY

## 2025-08-05 RX ORDER — METFORMIN HYDROCHLORIDE 750 MG/1
TABLET ORAL
Refills: 0 | Status: ACTIVE | COMMUNITY

## 2025-08-05 RX ORDER — ALPRAZOLAM 0.25 MG/1
0.25 TABLET ORAL
Refills: 0 | Status: ACTIVE | COMMUNITY

## 2025-08-05 RX ORDER — ATORVASTATIN CALCIUM 10 MG/1
10 TABLET, FILM COATED ORAL
Refills: 0 | Status: ACTIVE | COMMUNITY

## 2025-08-05 SDOH — SOCIAL STABILITY - SOCIAL INSECURITY: DISSAPEARANCE AND DEATH OF FAMILY MEMBER: Z63.4

## 2025-08-14 ENCOUNTER — OUTPATIENT (OUTPATIENT)
Dept: OUTPATIENT SERVICES | Facility: HOSPITAL | Age: 80
LOS: 1 days | End: 2025-08-14
Payer: MEDICARE

## 2025-08-14 DIAGNOSIS — Z96.649 PRESENCE OF UNSPECIFIED ARTIFICIAL HIP JOINT: Chronic | ICD-10-CM

## 2025-08-14 DIAGNOSIS — G47.33 OBSTRUCTIVE SLEEP APNEA (ADULT) (PEDIATRIC): ICD-10-CM

## 2025-08-14 PROCEDURE — 95800 SLP STDY UNATTENDED: CPT

## 2025-08-14 PROCEDURE — G0400: CPT | Mod: 26

## 2025-08-21 ENCOUNTER — APPOINTMENT (OUTPATIENT)
Dept: PULMONOLOGY | Facility: CLINIC | Age: 80
End: 2025-08-21
Payer: MEDICARE

## 2025-08-21 VITALS
SYSTOLIC BLOOD PRESSURE: 120 MMHG | DIASTOLIC BLOOD PRESSURE: 70 MMHG | OXYGEN SATURATION: 96 % | RESPIRATION RATE: 16 BRPM | HEART RATE: 86 BPM

## 2025-08-21 VITALS — WEIGHT: 90 LBS | BODY MASS INDEX: 16.56 KG/M2 | HEIGHT: 61.75 IN

## 2025-08-21 DIAGNOSIS — Z86.16 PERSONAL HISTORY OF COVID-19: ICD-10-CM

## 2025-08-21 DIAGNOSIS — R06.02 SHORTNESS OF BREATH: ICD-10-CM

## 2025-08-21 DIAGNOSIS — G47.33 OBSTRUCTIVE SLEEP APNEA (ADULT) (PEDIATRIC): ICD-10-CM

## 2025-08-21 DIAGNOSIS — Z87.891 PERSONAL HISTORY OF NICOTINE DEPENDENCE: ICD-10-CM

## 2025-08-21 PROCEDURE — 99214 OFFICE O/P EST MOD 30 MIN: CPT

## 2025-08-25 ENCOUNTER — TRANSCRIPTION ENCOUNTER (OUTPATIENT)
Age: 80
End: 2025-08-25

## 2025-08-26 ENCOUNTER — TRANSCRIPTION ENCOUNTER (OUTPATIENT)
Age: 80
End: 2025-08-26

## 2025-08-28 ENCOUNTER — TRANSCRIPTION ENCOUNTER (OUTPATIENT)
Age: 80
End: 2025-08-28

## 2025-09-02 ENCOUNTER — TRANSCRIPTION ENCOUNTER (OUTPATIENT)
Age: 80
End: 2025-09-02

## 2025-09-11 ENCOUNTER — NON-APPOINTMENT (OUTPATIENT)
Age: 80
End: 2025-09-11

## (undated) DEVICE — FORCEP ENDOJAW AGTR LC W NDL 2.8MM 230CM DISP

## (undated) DEVICE — KIT DEFENDO 4 OLY 4 PC